# Patient Record
Sex: FEMALE | Race: WHITE | NOT HISPANIC OR LATINO | Employment: FULL TIME | URBAN - METROPOLITAN AREA
[De-identification: names, ages, dates, MRNs, and addresses within clinical notes are randomized per-mention and may not be internally consistent; named-entity substitution may affect disease eponyms.]

---

## 2017-01-02 ENCOUNTER — TELEPHONE (OUTPATIENT)
Dept: NURSING | Facility: CLINIC | Age: 32
End: 2017-01-02

## 2017-01-02 ENCOUNTER — TELEPHONE (OUTPATIENT)
Dept: OBGYN | Facility: CLINIC | Age: 32
End: 2017-01-02

## 2017-01-02 NOTE — TELEPHONE ENCOUNTER
I called Gay back and she has not tried taking any Vit. B 6 or Unisom. I did advise pt to take Vit. B6, 25 mg one four times daily and Unisom 25mg 1/2 tab up to 3 times daily. She should try this for 24 hours and if she is not feeling any better or worse, then she needs to call us back right away. I encouraged pt to try and drink small sips of water frequently or ginger ale/7up.. LINDA Mazariegos RN

## 2017-01-02 NOTE — TELEPHONE ENCOUNTER
"Call Type: Triage Call    Presenting Problem: \"I'm about 8 weeks pregnant and I've issues with  nausea.\" Patient drinking less, ate few crackers since Saturday.  Voiding normal.  Triage Note:  Guideline Title: Pregnancy: Nausea or Vomiting  Recommended Disposition: See Provider within 8 Hours  Original Inclination: Would have called ED  Override Disposition:  Intended Action: Follow advice given  Physician Contacted: No  Unable to drink fluids or eat because of severe nausea AND not relieved with home  care measures or prescribed medications ?  YES  Signs of dehydration ? NO  Insulin requiring diabetic ? NO  Severe breathing problems ? NO  New or worsening signs and symptoms that may indicate shock ? NO  Foreign body in mouth, throat, or esophagus ? NO  Smoke/carbon monoxide exposure ? NO  Any change in vision OR eye pain ? NO  Following ingestion of toxic or caustic substance ? NO  Greater than 20 weeks gestation AND history of high blood pressure or history of  preeclampsia ? NO  Rapid heart rate (greater than 120 beats per minute) at rest ? NO  Unbearable abdominal/pelvic pain or cramping ? NO  Any cardiac signs/symptoms for more than 5 minutes, now or within last hour ? NO  Vomiting red, bloody or coffee-ground material, more than streaks of blood or  scant amount (not following nosebleed within past day) ? NO  Temperature of 100 F (37.7 C) or greater ? NO  Any vomiting associated with a head injury, now or within previous 48 hours ? NO  New onset seizure at any stage of pregnancy ? NO  Decreased fetal movement (less than 10 kicks/movements within two hours or a  significant change in usual pattern compared to previous days) ? NO  New onset nausea/vomiting associated with stiff neck causing pain with forward  head movement, severe generalized headache, fever, or altered mental status ? NO  Sudden onset vomiting following ingestion or inhalation overdose (accidental or  intentional) of illegal, prescription, " nonprescription or alternative drugs ? NO  Jaundice (yellowish tint to skin or whites of eyes) that is worsening or not  previously evaluated. ? NO  Sudden onset of focal neurological changes (difficulty speaking, numbness,  weakness, paralysis, loss of coordination, or change in vision such as double  vision or loss of visual field) ? NO  Persistent dizziness OR lightheadedness that does not resolve within ten minutes ?  NO  Unbearable headache ? NO  Abdominal pain is more bothersome than nausea or vomiting ? NO  Persistent headache 8 hours or more NOT relieved with nonprescription medication ?  NO  Known seizure disorder and is having or had a seizure within past 6 hours (at any  state of pregnancy) ? NO  Known seizure disorder AND had a seizure more than 6 hours ago ? NO  Unable to retain food or fluids for 8 hours or more due to recurrent vomiting ? NO  Physician Instructions:  Care Advice: Another adult should drive.  Call provider if develop temperature greater than 100 F (37.7 C).  CAUTIONS  SYMPTOM / CONDITION MANAGEMENT  List, or take, all current prescription(s), nonprescription or alternative  medication(s) to provider for evaluation.  Moderate daily activities until evaluated by provider.  Go to the ED if you have developed signs and symptoms of dehydration such  as very dry mouth and tongue  increased pulse rate at rest  no urine output for 12 hours or more  increasing weakness or drowsiness, or lightheadedness when trying to sit  upright or standing.  Take sips of clear liquids (such as water, clear fruit juices without pulp,  soda, tea or coffee without dairy or non-dairy creamer, clear broth or  bouillon, oral hydration solution, or plain gelatin, fruit ices/popsicles,  hard candy) as tolerated. Sucking on ice chips is another option.

## 2017-01-02 NOTE — TELEPHONE ENCOUNTER
"Clinic Action Needed: Yes. Please call Gay at [phone].     [facility] Triage Call    Presenting Problem:\"I'm about 8 weeks pregnant and I've issues with nausea.\"  Patient drinking less, ate few crackers since Saturday. Voiding normal.    Guideline Used:Pregnancy: Nausea and Vomiting.  Disposition:See Provider within 8 hours. Please advise patient.     Patient Recommendations/Teaching:Please call back if you do not hear from clinic or any further concerns.     Routed to: RN Pool.     Thank you.       Brianna Dee RN [facility]     "

## 2017-01-02 NOTE — TELEPHONE ENCOUNTER
Pt calls, left vm stating she can not find Vitamin B6 25 mg tablets, only 100 mg tablets.     Called pt, reports she tried [facility], [address],  [facility], [facility], and [facility]. Per online search [facility] carries and [facility] may as well.

## 2017-01-03 ENCOUNTER — TELEPHONE (OUTPATIENT)
Dept: OBGYN | Facility: CLINIC | Age: 32
End: 2017-01-03

## 2017-01-03 DIAGNOSIS — O21.9 NAUSEA AND VOMITING DURING PREGNANCY: Primary | ICD-10-CM

## 2017-01-03 RX ORDER — ONDANSETRON 4 MG/1
4 TABLET, ORALLY DISINTEGRATING ORAL EVERY 8 HOURS PRN
Qty: 30 TABLET | Refills: 1 | Status: SHIPPED | OUTPATIENT
Start: 2017-01-03 | End: 2017-02-02

## 2017-01-03 NOTE — TELEPHONE ENCOUNTER
"Pt currently 8w5d.    See 01/02/17 telephone encounter discussing nausea. Pt reports finding Vit B6 25 mg tabs at [facility] and has been taking with the Unisom and other interventions in encounter with no relief. States unisom \"knocked me out\".     Pt understands she hasn't seen Dr. Garcia yet (1st appt is 01/11), but would like further recommendations for nausea control. Indicates no vomit, but nausea with decreased appetite. Reports eating only a piece of toast both yest and today. Pt goes into work at 7PM.    Please advise, thanks.  "

## 2017-01-03 NOTE — TELEPHONE ENCOUNTER
Called pt, relay MD message below. Pt concerned as she has heard zofran can cause fetal heart defects. States she had colleague that took diclegis for nausea during pregnancy with good results. Offer to send MD note asking if diclegis is an option to be ordered and/or asking for further information on concern regarding s/e to baby. Pt declines at this time. Plans to try zofran and will call clinic back if desires to pursue questions/concerns further.

## 2017-01-11 ENCOUNTER — PRENATAL OFFICE VISIT (OUTPATIENT)
Dept: OBGYN | Facility: CLINIC | Age: 32
End: 2017-01-11
Payer: COMMERCIAL

## 2017-01-11 VITALS — DIASTOLIC BLOOD PRESSURE: 60 MMHG | SYSTOLIC BLOOD PRESSURE: 100 MMHG | TEMPERATURE: 98.2 F | WEIGHT: 173.6 LBS

## 2017-01-11 DIAGNOSIS — Z34.01 SUPERVISION OF NORMAL FIRST PREGNANCY IN FIRST TRIMESTER: Primary | ICD-10-CM

## 2017-01-11 PROBLEM — Z34.00 SUPERVISION OF NORMAL FIRST PREGNANCY: Status: ACTIVE | Noted: 2017-01-11

## 2017-01-11 PROCEDURE — 99207 ZZC FIRST OB VISIT: CPT | Performed by: OBSTETRICS & GYNECOLOGY

## 2017-01-12 DIAGNOSIS — O26.90 PREGNANCY RELATED CONDITION, UNSPECIFIED TRIMESTER: Primary | ICD-10-CM

## 2017-01-12 NOTE — PROGRESS NOTES
This is a 31 year old female patient,   who presents for her first obstetrical visit.    EDC Aug 10, 2017 by lmp and ultrasound which makes her 9w6d weeks today.  Her cycles are regular.  Her last menstrual period was normal. Since her LMP, she has experienced  nausea).  She denies abdominal pain and vaginal bleeding. Ultrasound in the 1st trimester showed EDC consistent with dates by sure LMP.     Past History:  Her past medical history   Past Medical History   Diagnosis Date     Depression      in High school     Anxiety      in High school   .      This is her first pregnancy. It was not planned, but they are very excited. They were just  in September.    Since her last LMP she denies use of alcohol, tobacco and street drugs.    HISTORY:  Obstetric History       T0      TAB0   SAB0   E0   M0   L0       # Outcome Date GA Lbr Waqas/2nd Weight Sex Delivery Anes PTL Lv   1 Current                 Past Medical History   Diagnosis Date     Depression      in High school     Anxiety      in High school     Past Surgical History   Procedure Laterality Date     Cholecystectomy       Hc tooth extraction w/forcep  2000     Adenoidectomy  2009     Family History   Problem Relation Age of Onset     Rheumatoid Arthritis Mother 50     Family History Negative Father      Family History Negative Sister      Type 2 Diabetes Maternal Grandfather      Lung Cancer Maternal Grandfather      smoker     Thyroid Disease Maternal Grandmother      CEREBROVASCULAR DISEASE Maternal Grandmother 75     Breast Cancer Paternal Grandmother      HEART DISEASE Paternal Grandfather      Social History     Social History     Marital Status:      Spouse Name: N/A     Number of Children: N/A     Years of Education: N/A     Social History Main Topics     Smoking status: Never Smoker      Smokeless tobacco: Never Used     Alcohol Use: No      Comment: not during pregnancy     Drug Use: No     Sexual Activity:      Partners: Male     Other Topics Concern      Service No     Blood Transfusions No     Caffeine Concern Yes     Occupational Exposure Yes     Hobby Hazards No     Sleep Concern Yes     Stress Concern No     Weight Concern No     Special Diet No     Back Care No     Exercise No     Bike Helmet Yes     Seat Belt Yes     Self-Exams No     Social History Narrative     Current Outpatient Prescriptions   Medication Sig     Pyridoxine HCl (VITAMIN B6 PO) Take 25 mg by mouth     PRENATAL VIT-FE BISG-FA-DHA PO      ondansetron (ZOFRAN ODT) 4 MG ODT tab Take 1 tablet (4 mg) by mouth every 8 hours as needed for nausea     No current facility-administered medications for this visit.     No Known Allergies    Past medical, surgical, social and family history were reviewed and updated in EPIC.    ROS:   12 point review of systems negative other than symptoms noted below.    EXAM:  /60 mmHg  Temp(Src) 98.2  F (36.8  C) (Oral)  Wt 78.744 kg (173 lb 9.6 oz)  LMP 11/03/2016 (Exact Date)  Breastfeeding? No   BMI: There is no height on file to calculate BMI.    EXAM:  Constitutional: Appearance: Well nourished, well developed alert, in no acute distress  Chest:  Respiratory Effort:  Breathing unlabored  Skin:  General Inspection:  No rashes present, no lesions present, no areas of  discoloration.  Neurologic/Psychiatric:    Mental Status:  Oriented X3     Pelvis: Not needed today. She is too early for FHTs abdominally, so that was not attempted.    ASSESSMENT:      ICD-10-CM    1. Supervision of normal first pregnancy in first trimester Z34.01 MAT FETAL MED CTR REFERRAL-PREGNANCY       PLAN:    Prenatal labs reviewed. She has no questions.    Discussed options for screening for chromosomal anomalies, including first screen, noninvasive prenatal testing, CVS/amniocentesis, quad screen, and ultrasound at 18-20 weeks. She is electing first trimester screen.    Reviewed early pregnancy education, diet, exercise, prenatal  vitamins, intercourse. Reviewed the call schedule, labor and delivery, and the schedule of prenatal visits.    Follow up in 4 weeks. She is encouraged to call sooner with questions or concerns.      Sally Garcia MD

## 2017-01-18 ENCOUNTER — TELEPHONE (OUTPATIENT)
Dept: OBGYN | Facility: CLINIC | Age: 32
End: 2017-01-18

## 2017-01-18 NOTE — TELEPHONE ENCOUNTER
Pt is calling she is needing a quantity limit override for Zofran Call [phone]  ID [id number]   Changed pharmacies as well.    [facility]   Called at above number   Over the phone I was able to answer the question for the quantity limit override.  I was told it would take 24-48 hours to hear back from them about their decision.    Denilson BLANCO RN

## 2017-01-18 NOTE — TELEPHONE ENCOUNTER
Pt called and LM, informed that filed paperwork for Zofran. It will take 24-48 hours to hear back.    Denilson BLANCO RN

## 2017-01-19 ENCOUNTER — PRE VISIT (OUTPATIENT)
Dept: MATERNAL FETAL MEDICINE | Facility: CLINIC | Age: 32
End: 2017-01-19

## 2017-01-19 ENCOUNTER — MYC MEDICAL ADVICE (OUTPATIENT)
Dept: OBGYN | Facility: CLINIC | Age: 32
End: 2017-01-19

## 2017-01-19 NOTE — Clinical Note
[facility]  [address], [address]  [place], [place]  [address]                                            TEL:[phone]  FAX:[phone]            2017    To Whom It May Concern,      This is a letter of medical necessity for Gay Trinidad  1985. Gay is pregnant and is requiring Ondansetron 4mg tablets 1 tablet 1-2 times a day to control hyperemesis related to pregnancy. Therefore, Dr. Sally Garcia is requesting 30 tablet fill with 3 refills be covered by insurance.       Sincerely,        Dr. Sally Garcia

## 2017-01-19 NOTE — TELEPHONE ENCOUNTER
Fine to do a letter stating that she needs this 1-2 times daily for hyperemesis, so requesting 30 with 3 refills. Thanks.  Sally Garcia MD

## 2017-01-20 NOTE — TELEPHONE ENCOUNTER
Pt calls to check status of below.     See 01/18 telephone encounter. Called insurance company, the PA started on 01/18 has been denied. Next step is to fax a letter of medical necessity to the exceptions department at: [phone]. The letter needs to include that she is pregnant, the amount requested, why this amount is needed, and MD signature.    As MD is not at this location letter may need to be addressed by CR staff or MD may fax the letter back to [place] to address.    Please advise, thanks.    Called pt, notified of update above. Next available fill is tomorrow. Discussed if need be paying out of pocket for a few pills if need be until response from insurance.

## 2017-01-26 ENCOUNTER — HOSPITAL ENCOUNTER (OUTPATIENT)
Dept: ULTRASOUND IMAGING | Facility: CLINIC | Age: 32
Discharge: HOME | End: 2017-01-26
Attending: OBSTETRICS & GYNECOLOGY | Admitting: OBSTETRICS & GYNECOLOGY
Payer: COMMERCIAL

## 2017-01-26 ENCOUNTER — OFFICE VISIT (OUTPATIENT)
Dept: MATERNAL FETAL MEDICINE | Facility: CLINIC | Age: 32
End: 2017-01-26
Attending: OBSTETRICS & GYNECOLOGY
Payer: COMMERCIAL

## 2017-01-26 ENCOUNTER — HOSPITAL ENCOUNTER (OUTPATIENT)
Dept: LAB | Facility: CLINIC | Age: 32
End: 2017-01-26
Attending: OBSTETRICS & GYNECOLOGY
Payer: COMMERCIAL

## 2017-01-26 DIAGNOSIS — Z36.9 FIRST TRIMESTER SCREENING: Primary | ICD-10-CM

## 2017-01-26 DIAGNOSIS — O26.90 PREGNANCY RELATED CONDITION, UNSPECIFIED TRIMESTER: ICD-10-CM

## 2017-01-26 DIAGNOSIS — Z36.9 FIRST TRIMESTER SCREENING: ICD-10-CM

## 2017-01-26 PROCEDURE — 84704 HCG FREE BETACHAIN TEST: CPT | Performed by: OBSTETRICS & GYNECOLOGY

## 2017-01-26 PROCEDURE — 84163 PAPPA SERUM: CPT | Performed by: OBSTETRICS & GYNECOLOGY

## 2017-01-26 PROCEDURE — 36415 COLL VENOUS BLD VENIPUNCTURE: CPT | Performed by: OBSTETRICS & GYNECOLOGY

## 2017-01-26 PROCEDURE — 76801 OB US < 14 WKS SINGLE FETUS: CPT

## 2017-01-26 PROCEDURE — 96040 ZZH GENETIC COUNSELING, EACH 30 MINUTES: CPT | Mod: ZF | Performed by: GENETIC COUNSELOR, MS

## 2017-01-26 NOTE — PROGRESS NOTES
"Please see \"imaging\" tab under \"Chart Review\" for details of today's US at the [facility].    Benedict Harman MD  Maternal-Fetal Medicine    "

## 2017-01-26 NOTE — PROGRESS NOTES
[facility]  Genetic Counseling Consult    Patient: Gay Trinidad YOB: 1985   Date of Service: 17      Gay Trinidad was seen at [facility] for genetic consultation to discuss the options for routine screening for fetal chromosome abnormalities.       Impression/Plan:   1.  Gay had an ultrasound and blood draw for first trimester screening.  Results are expected within 3-5 days, and will be available in [facility].  We will contact her to discuss the results, and a copy will be forwarded to the office of the referring OB provider.    2. Maternal serum AFP (single marker screen) is recommended after 15 weeks to screen for open neural tube defects. A quad screen should not be performed.    Pregnancy History:   /Parity:     Age at Delivery: 32 year old  ARYA: 8/10/2017, Alternate ARYA Entry  Gestational Age: 12w0d    No significant complications or exposures were reported in the current pregnancy.       Family History:   A three-generation pedigree was obtained, and is scanned under the  Media  tab.   The following significant findings were reported by Gay:    Gay's , Faisal, reported that his maternal aunt was born with a cleft lip. Cleft lip (+/- palate) is a relatively common birth defect, occurring in approximately 1 in 1,000 live births. In the majority of cases, cleft lip (+/- palate) occurs as an isolated birth defect. However, many genetic syndromes and chromosome disorders have been identified which include cleft lip with or without palate. Based on the information provided, it is likely that Faisal's aunt's cleft lip cured as an isolated birth defect. Isolated cleft lip (+/- palate) is a multi-factorial condition, due to the interaction of multiple genes (polygenic) and environmental factors. Since there is a genetic component to isolated clefting abnormalities, there is an increased risk for  first degree relatives to also have a cleft lip (+/- palate). Since Luis's pregnancy is not a first degree relative to his maternal aunt, they are not at increased risk (high that the general population risk) to have a child with cleft lip (+/- palate).    Otherwise, the reported family history is negative for multiple miscarriages, stillbirths, mental retardation, known genetic conditions, and consanguinity.       Carrier Screening:   The patient reports that she and the father of the pregnancy have  ancestry:     Cystic fibrosis is an autosomal recessive genetic condition that occurs with increased frequency in individuals of  ancestry and carrier screening for this condition is available.  In addition,  screening in the [facility] includes cystic fibrosis.    Expanded carrier screening for mutations in a large panel of genes associated with autosomal recessive conditions including cystic fibrosis, spinal muscular atrophy, and others, is now available.    The patient was not certain about whether to pursue carrier screening today.  She was provided with a brochure about her testing options, and contact information if she has questions or wishes to pursue screening.       Risk Assessment for Chromosome Conditions:   We explained that the risk for fetal chromosome abnormalities increases with maternal age. We discussed specific features of common chromosome abnormalities, including Down syndrome, trisomy 13, trisomy 18, and sex chromosome trisomies.    At age 32 at delivery, the midtrimester risk to have a baby with Down syndrome is 1 in 597.    At age 32 at delivery, the midtrimester risk to have a baby with any chromosome abnormality is 1 in 299.        Testing Options:   We discussed the following options:   First trimester screening    First trimester ultrasound with nuchal translucency and nasal bone assessments, maternal plasma hCG, PETER-A, and AFP  measurement    Screens for fetal trisomy 21, trisomy 13, and trisomy 18    Cannot screen for open neural tube defects; maternal serum AFP after 15 weeks is recommended    We discussed additional testing options if her first trimester screen results are abnormal:   Non-invasive Prenatal Testing (NIPT)    Maternal plasma cell-free DNA testing; first trimester ultrasound with nuchal translucency and nasal bone assessment is recommended, when appropriate    Screens for fetal trisomy 21, trisomy 13, trisomy 18, and sex chromosome aneuploidy    Cannot screen for open neural tube defects; maternal serum AFP after 15 weeks is recommended     Chorionic villus sampling (CVS)    Invasive procedure typically performed in the first trimester by which placental villi are obtained for the purpose of chromosome analysis and/or other prenatal genetic analysis    Diagnostic results; >99% sensitivity for fetal chromosome abnormalities    Cannot test for open neural tube defects; maternal serum AFP after 15 weeks is recommended     Genetic Amniocentesis    Invasive procedure typically performed in the second trimester by which amniotic fluid is obtained for the purpose of chromosome analysis and/or other prenatal genetic analysis    Diagnostic results; >99% sensitivity for fetal chromosome abnormalities    AFAFP measurement tests for open neural tube defects     Comprehensive (Level II) ultrasound: Detailed ultrasound performed between 18-22 weeks gestation to screen for major birth defects and markers for aneuploidy.    We reviewed the benefits and limitations of this testing.  Screening tests provide a risk assessment specific to the pregnancy for certain fetal chromosome abnormalities, but cannot definitively diagnose or exclude a fetal chromosome abnormality.  Follow-up genetic counseling and consideration of diagnostic testing is recommended with any abnormal screening result.      It was a pleasure to be involved with Gay childs  care. Face-to-face time of the meeting was 30 minutes.    Radha Leyva MS, [facility]  Maternal Fetal Medicine  [facility]  Phone:[phone]  Email: [email or web address]

## 2017-01-27 NOTE — TELEPHONE ENCOUNTER
LMOVM of approved medication letter from [facility]  Brittnee Martinez CMA

## 2017-01-31 ENCOUNTER — TELEPHONE (OUTPATIENT)
Dept: MATERNAL FETAL MEDICINE | Facility: CLINIC | Age: 32
End: 2017-01-31

## 2017-01-31 NOTE — TELEPHONE ENCOUNTER
Left a message for Gay regarding her first trimester screening results. These results indicated a 1 in >10,000 risk for Down syndrome and a 1 in >10,000 risk for trisomy 18/13.  This screening test gives information about the risk of some chromosome conditions in a pregnancy, but does not definitively diagnose or exclude the presence of these chromosome conditions.  A copy of these results are available in her EPIC chart for her primary OB to review. Maternal serum AFP only is recommended in the second trimester to screen for neural tube defects.    Radha Leyva MS, [facility]  Maternal Fetal Medicine  [phone]

## 2017-02-01 LAB — LAB SCANNED RESULT: NORMAL

## 2017-02-02 ENCOUNTER — PRENATAL OFFICE VISIT (OUTPATIENT)
Dept: OBGYN | Facility: CLINIC | Age: 32
End: 2017-02-02
Payer: COMMERCIAL

## 2017-02-02 VITALS
DIASTOLIC BLOOD PRESSURE: 56 MMHG | SYSTOLIC BLOOD PRESSURE: 112 MMHG | HEIGHT: 67 IN | WEIGHT: 174.4 LBS | BODY MASS INDEX: 27.37 KG/M2

## 2017-02-02 DIAGNOSIS — Z34.00 SUPERVISION OF NORMAL FIRST PREGNANCY, ANTEPARTUM: Primary | ICD-10-CM

## 2017-02-02 DIAGNOSIS — O21.9 NAUSEA AND VOMITING DURING PREGNANCY: ICD-10-CM

## 2017-02-02 PROCEDURE — 99207 ZZC PRENATAL VISIT: CPT | Performed by: OBSTETRICS & GYNECOLOGY

## 2017-02-02 RX ORDER — ONDANSETRON 4 MG/1
4 TABLET, ORALLY DISINTEGRATING ORAL EVERY 8 HOURS PRN
Qty: 30 TABLET | Refills: 1 | Status: SHIPPED | OUTPATIENT
Start: 2017-02-02 | End: 2017-07-14

## 2017-02-02 NOTE — NURSING NOTE
"Chief Complaint   Patient presents with     Prenatal Care   13w0d  Need Rx for Zofran faxed to pharmacy in chart.  Pt c/o constipation.   Sleep issues, feelings of guilt.  Carissa Longoria MA      Initial /56 mmHg  Ht 5' 7\" (1.702 m)  Wt 174 lb 6.4 oz (79.107 kg)  BMI 27.31 kg/m2  LMP 11/03/2016 (Exact Date) Estimated body mass index is 27.31 kg/(m^2) as calculated from the following:    Height as of this encounter: 5' 7\" (1.702 m).    Weight as of this encounter: 174 lb 6.4 oz (79.107 kg).  BP completed using cuff size: regular      "

## 2017-02-02 NOTE — PROGRESS NOTES
"PROBLEM LIST  Low risk primip.  Apos/[place]/normal first tm screen/AFP:     She is still having significant nausea and vomiting. Weight is stable, but she is worried about \"not eating enough.\" Specifically, that she isn't eating healthy foods. Discussed bland diet, carbs okay for now, take prenatal vit when she can, etc. Will renew Zofran--using between 1-3 times per day. Significant constipation, likely a result of diet, pregnancy, and zofran. She is using stool softeners and Miralax.    She is also struggling emotionally with guilt--her neighbor and friend is pregnant, baby has what sounds like a lethal anomaly. She has family members who have been struggling to conceive. She feels guilty and doesn't know what to say to tell them about her pregnancy. We spent a lot of time discussing today. She denies anxiety or depression, states that she is actually very happy about this but struggling with how to tell her friends. We talked about signs of symptoms of depression or anxiety, and what we can do to treat that if she develops new symptoms.     RTC 4 weeks, AFP then.     Sally Garcia MD    "

## 2017-03-01 ENCOUNTER — PRENATAL OFFICE VISIT (OUTPATIENT)
Dept: OBGYN | Facility: CLINIC | Age: 32
End: 2017-03-01
Payer: COMMERCIAL

## 2017-03-01 VITALS
BODY MASS INDEX: 27.77 KG/M2 | WEIGHT: 177.3 LBS | DIASTOLIC BLOOD PRESSURE: 60 MMHG | SYSTOLIC BLOOD PRESSURE: 110 MMHG | TEMPERATURE: 98.1 F

## 2017-03-01 DIAGNOSIS — Z34.02 ENCOUNTER FOR SUPERVISION OF NORMAL FIRST PREGNANCY IN SECOND TRIMESTER: Primary | ICD-10-CM

## 2017-03-01 LAB
ERYTHROCYTE [DISTWIDTH] IN BLOOD BY AUTOMATED COUNT: 13.6 % (ref 10–15)
HCT VFR BLD AUTO: 34.9 % (ref 35–47)
HGB BLD-MCNC: 11.8 G/DL (ref 11.7–15.7)
MCH RBC QN AUTO: 31.9 PG (ref 26.5–33)
MCHC RBC AUTO-ENTMCNC: 33.8 G/DL (ref 31.5–36.5)
MCV RBC AUTO: 94 FL (ref 78–100)
PLATELET # BLD AUTO: 237 10E9/L (ref 150–450)
RBC # BLD AUTO: 3.7 10E12/L (ref 3.8–5.2)
WBC # BLD AUTO: 7.7 10E9/L (ref 4–11)

## 2017-03-01 PROCEDURE — 99000 SPECIMEN HANDLING OFFICE-LAB: CPT | Performed by: OBSTETRICS & GYNECOLOGY

## 2017-03-01 PROCEDURE — 36415 COLL VENOUS BLD VENIPUNCTURE: CPT | Performed by: OBSTETRICS & GYNECOLOGY

## 2017-03-01 PROCEDURE — 99207 ZZC PRENATAL VISIT: CPT | Performed by: OBSTETRICS & GYNECOLOGY

## 2017-03-01 PROCEDURE — 85027 COMPLETE CBC AUTOMATED: CPT | Performed by: OBSTETRICS & GYNECOLOGY

## 2017-03-01 PROCEDURE — 82105 ALPHA-FETOPROTEIN SERUM: CPT | Mod: 90 | Performed by: OBSTETRICS & GYNECOLOGY

## 2017-03-01 RX ORDER — POLYETHYLENE GLYCOL 3350 17 G/17G
17 POWDER, FOR SOLUTION ORAL DAILY
COMMUNITY
End: 2017-07-14

## 2017-03-01 NOTE — NURSING NOTE
"Chief Complaint   Patient presents with     Prenatal Care     Nausea       Initial /60 (BP Location: Right arm, Patient Position: Chair, Cuff Size: Adult Regular)  Temp 98.1  F (36.7  C) (Oral)  Wt 177 lb 4.8 oz (80.4 kg)  LMP 11/03/2016 (Exact Date)  BMI 27.77 kg/m2 Estimated body mass index is 27.77 kg/(m^2) as calculated from the following:    Height as of 2/2/17: 5' 7\" (1.702 m).    Weight as of this encounter: 177 lb 4.8 oz (80.4 kg).  Medication Reconciliation: complete  16w6d    "

## 2017-03-01 NOTE — MR AVS SNAPSHOT
After Visit Summary   3/1/2017    Gay Trinidad    MRN: [id number]           Patient Information     Date Of Birth          1985        Visit Information        Provider Department      3/1/2017 2:00 PM Kayla Carlton MD [facility]        Today's Diagnoses     Encounter for supervision of normal first pregnancy in second trimester    -  1       Follow-ups after your visit        Your next 10 appointments already scheduled     Mar 28, 2017  8:15 AM CDT   US OB > 14 WEEKS COMPLETE SINGLE with RIUS1   [facility] ([facility])    [address]  [address]  [facility] [id number] [phone]           Please bring a list of your medicines (including vitamins, minerals and over-the-counter drugs). Also, tell your doctor about any allergies you may have. Wear comfortable clothes and leave your valuables at home.  If you re less than 20 weeks drink four 8-ounce glasses of fluid an hour before your exam. If you need to empty your bladder before your exam, try to release only a little urine. Then, drink another glass of fluid.  You may have up to two family members in the exam room. If you bring a small child, an adult must be there to care for him or her.  Please call the Imaging Department at your exam site with any questions.            Apr 04, 2017  3:00 PM CDT   ESTABLISHED PRENATAL with Kayla Carlton MD   [facility] ([facility])    [address] [place] [id number] [phone]              Who to contact     If you have questions or need follow up information about today's clinic visit or your schedule please contact [facility] directly at [phone].  Normal or non-critical lab and imaging results will be communicated to you by MyChart, letter or phone within 4 business days after the clinic has received the results. If you do not hear from us within 7  days, please contact the clinic through [employer] or phone. If you have a critical or abnormal lab result, we will notify you by phone as soon as possible.  Submit refill requests through [employer] or call your pharmacy and they will forward the refill request to us. Please allow 3 business days for your refill to be completed.          Additional Information About Your Visit        [employer] Information     [employer] gives you secure access to your electronic health record. If you see a primary care provider, you can also send messages to your care team and make appointments. If you have questions, please call your primary care clinic.  If you do not have a primary care provider, please call [phone] and they will assist you.        Care EveryWhere ID     This is your Care EveryWhere ID. This could be used by other organizations to access your [place] medical records  [id number]        Your Vitals Were     Temperature Last Period BMI (Body Mass Index)             98.1  F (36.7  C) (Oral) 11/03/2016 (Exact Date) 27.77 kg/m2          Blood Pressure from Last 3 Encounters:   03/01/17 110/60   02/02/17 112/56   01/11/17 100/60    Weight from Last 3 Encounters:   03/01/17 177 lb 4.8 oz (80.4 kg)   02/02/17 174 lb 6.4 oz (79.1 kg)   01/11/17 173 lb 9.6 oz (78.7 kg)              We Performed the Following     Alpha fetoprotein maternal screen     CBC with platelets        Primary Care Provider Office Phone # Fax #    Brittaney Mata [phone]       [facility] [address] [place] [address]        Thank you!     Thank you for choosing [facility]  for your care. Our goal is always to provide you with excellent care. Hearing back from our patients is one way we can continue to improve our services. Please take a few minutes to complete the written survey that you may receive in the mail after your visit with us. Thank you!             Your Updated Medication List - Protect others  around you: Learn how to safely use, store and throw away your medicines at www.disposemymeds.org.          This list is accurate as of: 3/1/17  2:34 PM.  Always use your most recent med list.                   Brand Name Dispense Instructions for use    COLACE PO      Take 50 mg by mouth as needed for constipation       ondansetron 4 MG ODT tab    ZOFRAN ODT    30 tablet    Take 1 tablet (4 mg) by mouth every 8 hours as needed for nausea       polyethylene glycol powder    MIRALAX/GLYCOLAX     Take 17 g by mouth daily       PRENATAL VIT-FE BISG-FA-DHA PO          VITAMIN B6 PO      Take 25 mg by mouth Reported on 3/1/2017

## 2017-03-01 NOTE — PROGRESS NOTES
Routine OB Visit:    S: Continues to have significant nausea, though improving. Continued zofran, but not daily. Eating frequently, mostly carbs but is able to add yogurt, peanut butter, cheese, etc and is torating PNV. no contractions. Good fetal movement. No LoF, VB.    O: /60 (BP Location: Right arm, Patient Position: Chair, Cuff Size: Adult Regular)  Temp 98.1  F (36.7  C) (Oral)  Wt 177 lb 4.8 oz (80.4 kg)  LMP 2016 (Exact Date)  BMI 27.77 kg/m2   Gen: resting comfortably, in NAD  See Prenatal flowsheet    A/P: Gay Trinidad is a 31 year old female  at 16w6d, here for routine OB care. Pregnancy c/b nausea and poor weight gain.  PNC: A+, RI. 1st TM screen within normal limits. AFP today. Anatomy scan 18-20 wks  Nausea: continue frequent meals, zofran as needed  Constipation: increase miralax, continue senna  Return to clinic in 4 weeks.     Kayla Carlton MD  Obstetrics and Gynecology  3/1/2017

## 2017-03-03 LAB
# FETUSES US: NORMAL
AFP ADJ MOM AMN: 1.06
AFP SERPL-MCNC: 35 NG/ML
AGE - REPORTED: 31.3
DATING METHOD: NORMAL
DIABETIC AT CONCEPTION: NO
FAMILY MEMBER DISEASES HX: NO
FAMILY MEMBER DISEASES HX: NO
GA METHOD: NORMAL
GA: 16.86 WK
HX OF HEREDITARY DISORDERS: NO
IDDM PATIENT QL: NO
INTEGRATED SCN PATIENT-IMP: NORMAL
LMP START DATE: NORMAL
PREV HX CHROMOSOME ABNORMALITY: NO
SPECIMEN DRAWN SERPL: NORMAL
TWINS: NORMAL

## 2017-03-28 ENCOUNTER — RADIANT APPOINTMENT (OUTPATIENT)
Dept: ULTRASOUND IMAGING | Facility: CLINIC | Age: 32
End: 2017-03-28
Attending: OBSTETRICS & GYNECOLOGY
Payer: COMMERCIAL

## 2017-03-28 VITALS — SYSTOLIC BLOOD PRESSURE: 102 MMHG | DIASTOLIC BLOOD PRESSURE: 72 MMHG

## 2017-03-28 DIAGNOSIS — Z34.00 SUPERVISION OF NORMAL FIRST PREGNANCY, ANTEPARTUM: ICD-10-CM

## 2017-03-28 PROCEDURE — 76805 OB US >/= 14 WKS SNGL FETUS: CPT | Performed by: OBSTETRICS & GYNECOLOGY

## 2017-03-31 DIAGNOSIS — Z34.02 ENCOUNTER FOR SUPERVISION OF NORMAL FIRST PREGNANCY IN SECOND TRIMESTER: Primary | ICD-10-CM

## 2017-04-04 ENCOUNTER — HOSPITAL ENCOUNTER (OUTPATIENT)
Dept: ULTRASOUND IMAGING | Facility: CLINIC | Age: 32
Discharge: HOME | End: 2017-04-04
Attending: OBSTETRICS & GYNECOLOGY | Admitting: OBSTETRICS & GYNECOLOGY
Payer: COMMERCIAL

## 2017-04-04 ENCOUNTER — PRENATAL OFFICE VISIT (OUTPATIENT)
Dept: OBGYN | Facility: CLINIC | Age: 32
End: 2017-04-04
Payer: COMMERCIAL

## 2017-04-04 ENCOUNTER — OFFICE VISIT (OUTPATIENT)
Dept: MATERNAL FETAL MEDICINE | Facility: CLINIC | Age: 32
End: 2017-04-04
Attending: OBSTETRICS & GYNECOLOGY
Payer: COMMERCIAL

## 2017-04-04 VITALS
HEART RATE: 83 BPM | WEIGHT: 182 LBS | DIASTOLIC BLOOD PRESSURE: 58 MMHG | SYSTOLIC BLOOD PRESSURE: 112 MMHG | OXYGEN SATURATION: 99 % | BODY MASS INDEX: 28.51 KG/M2

## 2017-04-04 DIAGNOSIS — O26.90 PREGNANCY RELATED CONDITION, UNSPECIFIED TRIMESTER: ICD-10-CM

## 2017-04-04 DIAGNOSIS — Z03.73 SUSPECTED FETAL ANOMALY NOT FOUND: Primary | ICD-10-CM

## 2017-04-04 DIAGNOSIS — Z36.3 ANTENATAL SCREENING FOR MALFORMATION USING ULTRASONICS: ICD-10-CM

## 2017-04-04 DIAGNOSIS — Z34.02 ENCOUNTER FOR SUPERVISION OF NORMAL FIRST PREGNANCY IN SECOND TRIMESTER: Primary | ICD-10-CM

## 2017-04-04 PROCEDURE — 76811 OB US DETAILED SNGL FETUS: CPT

## 2017-04-04 PROCEDURE — 99207 ZZC PRENATAL VISIT: CPT | Performed by: OBSTETRICS & GYNECOLOGY

## 2017-04-04 RX ORDER — PNV NO.95/FERROUS FUM/FOLIC AC 28MG-0.8MG
1 TABLET ORAL DAILY
COMMUNITY
End: 2018-10-08

## 2017-04-04 NOTE — MR AVS SNAPSHOT
After Visit Summary   4/4/2017    Gay Trinidad    MRN: [id number]           Patient Information     Date Of Birth          1985        Visit Information        Provider Department      4/4/2017 3:00 PM Kayla Carlton MD [facility]        Today's Diagnoses     Encounter for supervision of normal first pregnancy in second trimester    -  1       Follow-ups after your visit        Your next 10 appointments already scheduled     Apr 25, 2017  8:00 AM CDT   MFM US COMPRE SINGLE F/U with [id number]   [facility] Maternal Fetal Medicine Ultrasound - [facility])    [address]  [facility] [id number] [phone]           Wear comfortable clothes and leave your valuables at home.            Apr 25, 2017  8:30 AM CDT   Radiology MD with  MFM MD   [facility] Maternal Fetal Medicine - [facility])    [address]  [facility] [id number] [phone]           Please arrive at the time given for your first appointment.  This visit is used internally to schedule the physician's time during your ultrasound.              Future tests that were ordered for you today     Open Future Orders        Priority Expected Expires Ordered    MFM US Comprehensive Single F/U Routine 4/25/2017 4/4/2018 4/4/2017            Who to contact     If you have questions or need follow up information about today's clinic visit or your schedule please contact [facility] directly at [phone].  Normal or non-critical lab and imaging results will be communicated to you by MyChart, letter or phone within 4 business days after the clinic has received the results. If you do not hear from us within 7 days, please contact the clinic through [employer] or phone. If you have a critical or abnormal lab result, we will notify you by phone as soon as possible.  Submit refill requests through [employer] or call  your pharmacy and they will forward the refill request to us. Please allow 3 business days for your refill to be completed.          Additional Information About Your Visit        [employer] Information     [email or web address] gives you secure access to your electronic health record. If you see a primary care provider, you can also send messages to your care team and make appointments. If you have questions, please call your primary care clinic.  If you do not have a primary care provider, please call [phone] and they will assist you.        Care EveryWhere ID     This is your Care EveryWhere ID. This could be used by other organizations to access your [place] medical records  [id number]        Your Vitals Were     Pulse Last Period Pulse Oximetry BMI (Body Mass Index)          83 11/03/2016 (Exact Date) 99% 28.51 kg/m2         Blood Pressure from Last 3 Encounters:   04/04/17 112/58   03/28/17 102/72   03/01/17 110/60    Weight from Last 3 Encounters:   04/04/17 182 lb (82.6 kg)   03/01/17 177 lb 4.8 oz (80.4 kg)   02/02/17 174 lb 6.4 oz (79.1 kg)              Today, you had the following     No orders found for display         Today's Medication Changes          These changes are accurate as of: 4/4/17  4:15 PM.  If you have any questions, ask your nurse or doctor.               Start taking these medicines.        Dose/Directions    ranitidine 150 MG tablet   Commonly known as:  ZANTAC   Used for:  Encounter for supervision of normal first pregnancy in second trimester   Started by:  Kayla Carlton MD        Dose:  150 mg   Take 1 tablet (150 mg) by mouth 2 times daily   Quantity:  60 tablet   Refills:  11            Where to get your medicines      These medications were sent to [facility] #[id number] - [place], [place] - [address] [facility]  [address] [facility] [address]     Phone:  [phone]     ranitidine 150 MG tablet                Primary Care Provider Office Phone # Fax #    Brittaney Mata  [phone]       [facility] [address] [place] [address]        Thank you!     Thank you for choosing [facility]  for your care. Our goal is always to provide you with excellent care. Hearing back from our patients is one way we can continue to improve our services. Please take a few minutes to complete the written survey that you may receive in the mail after your visit with us. Thank you!             Your Updated Medication List - Protect others around you: Learn how to safely use, store and throw away your medicines at www.disposemymeds.org.          This list is accurate as of: 4/4/17  4:15 PM.  Always use your most recent med list.                   Brand Name Dispense Instructions for use    COLACE PO      Take 50 mg by mouth as needed for constipation       FISH OIL OMEGA-3 1000 MG Caps          ondansetron 4 MG ODT tab    ZOFRAN ODT    30 tablet    Take 1 tablet (4 mg) by mouth every 8 hours as needed for nausea       polyethylene glycol powder    MIRALAX/GLYCOLAX     Take 17 g by mouth daily       PRENATAL VIT-FE BISG-FA-DHA PO          ranitidine 150 MG tablet    ZANTAC    60 tablet    Take 1 tablet (150 mg) by mouth 2 times daily       VITAMIN B6 PO      Take 25 mg by mouth Reported on 3/1/2017

## 2017-04-04 NOTE — PROGRESS NOTES
Routine OB Visit:    S: Continues to have nausea, though improving. Continued occasional zofran. Eating frequently, mostly carbs but is able to add egg and veggie omlets. no contractions. Good fetal movement. No LoF, VB.    O: /58 (Cuff Size: Adult Regular)  Pulse 83  Wt 182 lb (82.6 kg)  LMP 2016 (Exact Date)  SpO2 99%  BMI 28.51 kg/m2   Gen: resting comfortably, in NAD  See Prenatal flowsheet    A/P: Gay Trinidad is a 31 year old female  at 21w5d, here for routine OB care. Pregnancy c/b nausea and poor weight gain.  PNC: A+, RI. 1st TM screen within normal limits. AFP today. Anatomy scan 18-20 wks  Nausea: continue frequent meals, zofran as needed. Try twice a day zantac for relief  Constipation: improved, continue senna and colace, as needed miralax  Return to clinic in 4 weeks.     Kayla Carlton MD  Obstetrics and Gynecology

## 2017-04-04 NOTE — NURSING NOTE
"Chief Complaint   Patient presents with     Prenatal Care       Initial /58 (Cuff Size: Adult Regular)  Pulse 83  Wt 182 lb (82.6 kg)  LMP 2016 (Exact Date)  SpO2 99%  BMI 28.51 kg/m2 Estimated body mass index is 28.51 kg/(m^2) as calculated from the following:    Height as of 17: 5' 7\" (1.702 m).    Weight as of this encounter: 182 lb (82.6 kg).  BP completed using cuff size: regular        The following HM Due:       The following patient reported/Care Every where data was sent to:  P ABSTRACT QUALITY INITIATIVES [67194]       21w5d           Nausea   Low Appetite  Having abdominal cramps     Nicole Reese CMA              "

## 2017-04-04 NOTE — PROGRESS NOTES
Please refer to ultrasound report under 'Imaging' Studies of 'Chart Review' tabs.    Ben Grant M.D.

## 2017-04-04 NOTE — MR AVS SNAPSHOT
After Visit Summary   2017    aGy Trinidad    MRN: [id number]           Patient Information     Date Of Birth          1985        Visit Information        Provider Department      2017 9:15 AM Ben Grant MD [facility] Maternal Fetal Medicine [facility]        Today's Diagnoses     Suspected fetal anomaly not found    -  1     screening for malformation using ultrasonics           Follow-ups after your visit        Your next 10 appointments already scheduled     2017  3:00 PM CDT   ESTABLISHED PRENATAL with Kayla Carlton MD   [facility]    [address] [address]   [phone]            2017  8:00 AM CDT   MFM US COMPRE SINGLE F/U with [email or web address]   [facility] Maternal Fetal Medicine Ultrasound - [facility])    [address]  [facility] [id number] [phone]           Wear comfortable clothes and leave your valuables at home.            2017  8:30 AM CDT   Radiology MD with  CLARICE LEARY   [facility] Maternal Fetal Medicine [facility] ([facility])    [address]  [facility] [address] [phone]           Please arrive at the time given for your first appointment.  This visit is used internally to schedule the physician's time during your ultrasound.              Future tests that were ordered for you today     Open Future Orders        Priority Expected Expires Ordered    MFM US Comprehensive Single F/U Routine 2017            Who to contact     If you have questions or need follow up information about today's clinic visit or your schedule please contact [facility]ERNAL FETAL MEDICINE [facility] directly at [phone].  Normal or non-critical lab and imaging results will be communicated to you by MyChart, letter or phone within 4 business days after the clinic has  received the results. If you do not hear from us within 7 days, please contact the clinic through [facility] or phone. If you have a critical or abnormal lab result, we will notify you by phone as soon as possible.  Submit refill requests through [facility] or call your pharmacy and they will forward the refill request to us. Please allow 3 business days for your refill to be completed.          Additional Information About Your Visit        [facility] Information     [facility] gives you secure access to your electronic health record. If you see a primary care provider, you can also send messages to your care team and make appointments. If you have questions, please call your primary care clinic.  If you do not have a primary care provider, please call [phone] and they will assist you.        Care EveryWhere ID     This is your Care EveryWhere ID. This could be used by other organizations to access your [place] medical records  [id number]        Your Vitals Were     Last Period                   11/03/2016 (Exact Date)            Blood Pressure from Last 3 Encounters:   03/28/17 102/72   03/01/17 110/60   02/02/17 112/56    Weight from Last 3 Encounters:   03/01/17 80.4 kg (177 lb 4.8 oz)   02/02/17 79.1 kg (174 lb 6.4 oz)   01/11/17 78.7 kg (173 lb 9.6 oz)               Primary Care Provider Office Phone # Fax #    Brittaney Mata [phone]       [facility] [address] [place] [address]        Thank you!     Thank you for choosing [facility]  for your care. Our goal is always to provide you with excellent care. Hearing back from our patients is one way we can continue to improve our services. Please take a few minutes to complete the written survey that you may receive in the mail after your visit with us. Thank you!             Your Updated Medication List - Protect others around you: Learn how to safely use, store and throw away your medicines at  www.disposemymeds.org.          This list is accurate as of: 4/4/17 10:01 AM.  Always use your most recent med list.                   Brand Name Dispense Instructions for use    COLACE PO      Take 50 mg by mouth as needed for constipation       ondansetron 4 MG ODT tab    ZOFRAN ODT    30 tablet    Take 1 tablet (4 mg) by mouth every 8 hours as needed for nausea       polyethylene glycol powder    MIRALAX/GLYCOLAX     Take 17 g by mouth daily       PRENATAL VIT-FE BISG-FA-DHA PO          VITAMIN B6 PO      Take 25 mg by mouth Reported on 3/1/2017

## 2017-04-25 ENCOUNTER — OFFICE VISIT (OUTPATIENT)
Dept: MATERNAL FETAL MEDICINE | Facility: CLINIC | Age: 32
End: 2017-04-25
Attending: OBSTETRICS & GYNECOLOGY
Payer: COMMERCIAL

## 2017-04-25 ENCOUNTER — HOSPITAL ENCOUNTER (OUTPATIENT)
Dept: ULTRASOUND IMAGING | Facility: CLINIC | Age: 32
Discharge: HOME | End: 2017-04-25
Attending: OBSTETRICS & GYNECOLOGY | Admitting: OBSTETRICS & GYNECOLOGY
Payer: COMMERCIAL

## 2017-04-25 DIAGNOSIS — Z03.79 SUSPECTED FETAL CONDITION NOT FOUND: Primary | ICD-10-CM

## 2017-04-25 DIAGNOSIS — Z03.73 SUSPECTED FETAL ANOMALY NOT FOUND: ICD-10-CM

## 2017-04-25 PROCEDURE — 76816 OB US FOLLOW-UP PER FETUS: CPT

## 2017-04-25 NOTE — MR AVS SNAPSHOT
After Visit Summary   4/25/2017    Gay Trinidad    MRN: [id number]           Patient Information     Date Of Birth          1985        Visit Information        Provider Department      4/25/2017 8:30 AM Vitor Robertson MD [facility] Maternal Fetal Medicine [facility]        Today's Diagnoses     Suspected fetal condition not found    -  1       Follow-ups after your visit        Your next 10 appointments already scheduled     May 10, 2017 11:00 AM CDT   ESTABLISHED PRENATAL with Kayla Carlton MD   [facility] ([facility])    [address]  [facility] [address] [phone]              Who to contact     If you have questions or need follow up information about today's clinic visit or your schedule please contact [facility]ERNAL FETAL MEDIC[facility] directly at [phone].  Normal or non-critical lab and imaging results will be communicated to you by [employer], letter or phone within 4 business days after the clinic has received the results. If you do not hear from us within 7 days, please contact the clinic through [employer] or phone. If you have a critical or abnormal lab result, we will notify you by phone as soon as possible.  Submit refill requests through [employer] or call your pharmacy and they will forward the refill request to us. Please allow 3 business days for your refill to be completed.          Additional Information About Your Visit        MyChart Information     [employer] gives you secure access to your electronic health record. If you see a primary care provider, you can also send messages to your care team and make appointments. If you have questions, please call your primary care clinic.  If you do not have a primary care provider, please call [phone] and they will assist you.        Care EveryWhere ID     This is your Care EveryWhere ID. This could be used by other organizations to access your [facility]  records  [id number]        Your Vitals Were     Last Period                   11/03/2016 (Exact Date)            Blood Pressure from Last 3 Encounters:   04/04/17 112/58   03/28/17 102/72   03/01/17 110/60    Weight from Last 3 Encounters:   04/04/17 82.6 kg (182 lb)   03/01/17 80.4 kg (177 lb 4.8 oz)   02/02/17 79.1 kg (174 lb 6.4 oz)              Today, you had the following     No orders found for display       Primary Care Provider Office Phone # Fax #    Brittaney Mata [phone]       [facility] [address] [place] [address]        Thank you!     Thank you for choosing [facility]  for your care. Our goal is always to provide you with excellent care. Hearing back from our patients is one way we can continue to improve our services. Please take a few minutes to complete the written survey that you may receive in the mail after your visit with us. Thank you!             Your Updated Medication List - Protect others around you: Learn how to safely use, store and throw away your medicines at www.disposemymeds.org.          This list is accurate as of: 4/25/17  2:54 PM.  Always use your most recent med list.                   Brand Name Dispense Instructions for use    COLACE PO      Take 50 mg by mouth as needed for constipation       FISH OIL OMEGA-3 1000 MG Caps          ondansetron 4 MG ODT tab    ZOFRAN ODT    30 tablet    Take 1 tablet (4 mg) by mouth every 8 hours as needed for nausea       polyethylene glycol powder    MIRALAX/GLYCOLAX     Take 17 g by mouth daily       PRENATAL VIT-FE BISG-FA-DHA PO          ranitidine 150 MG tablet    ZANTAC    60 tablet    Take 1 tablet (150 mg) by mouth 2 times daily       VITAMIN B6 PO      Take 25 mg by mouth Reported on 3/1/2017

## 2017-04-25 NOTE — PROGRESS NOTES
"Please see \"Imaging\" tab under \"Chart Review\" for details of today's US.    Vitor Robertson    "

## 2017-05-10 ENCOUNTER — PRENATAL OFFICE VISIT (OUTPATIENT)
Dept: OBGYN | Facility: CLINIC | Age: 32
End: 2017-05-10

## 2017-05-10 VITALS
BODY MASS INDEX: 29.52 KG/M2 | DIASTOLIC BLOOD PRESSURE: 60 MMHG | WEIGHT: 188.5 LBS | TEMPERATURE: 98.3 F | SYSTOLIC BLOOD PRESSURE: 100 MMHG

## 2017-05-10 DIAGNOSIS — Z34.00 SUPERVISION OF NORMAL FIRST PREGNANCY: ICD-10-CM

## 2017-05-10 NOTE — NURSING NOTE
"Chief Complaint   Patient presents with     Prenatal Care       Initial /60 (BP Location: Left arm, Patient Position: Chair, Cuff Size: Adult Regular)  Temp 98.3  F (36.8  C) (Oral)  Wt 188 lb 8 oz (85.5 kg)  LMP 11/03/2016 (Exact Date)  BMI 29.52 kg/m2 Estimated body mass index is 29.52 kg/(m^2) as calculated from the following:    Height as of 2/2/17: 5' 7\" (1.702 m).    Weight as of this encounter: 188 lb 8 oz (85.5 kg).  Medication Reconciliation: complete  26w6d    "

## 2017-05-10 NOTE — MR AVS SNAPSHOT
After Visit Summary   5/10/2017    Gay Trinidad    MRN: [id number]           Patient Information     Date Of Birth          1985        Visit Information        Provider Department      5/10/2017 11:00 AM Kayla Carlton MD [facility]        Today's Diagnoses     Supervision of normal first pregnancy           Follow-ups after your visit        Your next 10 appointments already scheduled     May 12, 2017 10:00 AM CDT   ESTABLISHED PRENATAL with Kayla Carlton MD   [facility] ([facility])    [address]  [facility] [id number] [phone]              Who to contact     If you have questions or need follow up information about today's clinic visit or your schedule please contact [facility] directly at [phone].  Normal or non-critical lab and imaging results will be communicated to you by MyChart, letter or phone within 4 business days after the clinic has received the results. If you do not hear from us within 7 days, please contact the clinic through MyChart or phone. If you have a critical or abnormal lab result, we will notify you by phone as soon as possible.  Submit refill requests through [employer] or call your pharmacy and they will forward the refill request to us. Please allow 3 business days for your refill to be completed.          Additional Information About Your Visit        MyChart Information     [employer] gives you secure access to your electronic health record. If you see a primary care provider, you can also send messages to your care team and make appointments. If you have questions, please call your primary care clinic.  If you do not have a primary care provider, please call [phone] and they will assist you.        Care EveryWhere ID     This is your Care EveryWhere ID. This could be used by other organizations to access your [place] medical records  [id number]        Your Vitals  Were     Temperature Last Period BMI (Body Mass Index)             98.3  F (36.8  C) (Oral) 11/03/2016 (Exact Date) 29.52 kg/m2          Blood Pressure from Last 3 Encounters:   05/10/17 100/60   04/04/17 112/58   03/28/17 102/72    Weight from Last 3 Encounters:   05/10/17 188 lb 8 oz (85.5 kg)   04/04/17 182 lb (82.6 kg)   03/01/17 177 lb 4.8 oz (80.4 kg)              Today, you had the following     No orders found for display         Today's Medication Changes          These changes are accurate as of: 5/10/17 11:43 AM.  If you have any questions, ask your nurse or doctor.               Stop taking these medicines if you haven't already. Please contact your care team if you have questions.     ranitidine 150 MG tablet   Commonly known as:  ZANTAC   Stopped by:  Kayla Carlton MD           VITAMIN B6 PO   Stopped by:  Kayla Carlton MD                    Primary Care Provider Office Phone # Fax #    Brittaney FRANCIS VieiraMata [phone]       [facility] [address] [place] [address]        Thank you!     Thank you for choosing [facility]  for your care. Our goal is always to provide you with excellent care. Hearing back from our patients is one way we can continue to improve our services. Please take a few minutes to complete the written survey that you may receive in the mail after your visit with us. Thank you!             Your Updated Medication List - Protect others around you: Learn how to safely use, store and throw away your medicines at www.disposemymeds.org.          This list is accurate as of: 5/10/17 11:43 AM.  Always use your most recent med list.                   Brand Name Dispense Instructions for use    COLACE PO      Take 50 mg by mouth as needed for constipation Reported on 5/10/2017       FISH OIL OMEGA-3 1000 MG Caps          ondansetron 4 MG ODT tab    ZOFRAN ODT    30 tablet    Take 1 tablet (4 mg) by mouth every 8 hours as needed for nausea        polyethylene glycol powder    MIRALAX/GLYCOLAX     Take 17 g by mouth daily Reported on 5/10/2017       PRENATAL VIT-FE BISG-FA-DHA PO

## 2017-05-12 ENCOUNTER — PRENATAL OFFICE VISIT (OUTPATIENT)
Dept: OBGYN | Facility: CLINIC | Age: 32
End: 2017-05-12
Payer: COMMERCIAL

## 2017-05-12 VITALS — BODY MASS INDEX: 29.63 KG/M2 | WEIGHT: 189.2 LBS | DIASTOLIC BLOOD PRESSURE: 60 MMHG | SYSTOLIC BLOOD PRESSURE: 100 MMHG

## 2017-05-12 DIAGNOSIS — Z34.02 ENCOUNTER FOR SUPERVISION OF NORMAL FIRST PREGNANCY IN SECOND TRIMESTER: Primary | ICD-10-CM

## 2017-05-12 LAB
ERYTHROCYTE [DISTWIDTH] IN BLOOD BY AUTOMATED COUNT: 13.3 % (ref 10–15)
HCT VFR BLD AUTO: 35.1 % (ref 35–47)
HGB BLD-MCNC: 11.7 G/DL (ref 11.7–15.7)
MCH RBC QN AUTO: 32.7 PG (ref 26.5–33)
MCHC RBC AUTO-ENTMCNC: 33.3 G/DL (ref 31.5–36.5)
MCV RBC AUTO: 98 FL (ref 78–100)
PLATELET # BLD AUTO: 220 10E9/L (ref 150–450)
RBC # BLD AUTO: 3.58 10E12/L (ref 3.8–5.2)
WBC # BLD AUTO: 8.9 10E9/L (ref 4–11)

## 2017-05-12 PROCEDURE — 90715 TDAP VACCINE 7 YRS/> IM: CPT | Performed by: OBSTETRICS & GYNECOLOGY

## 2017-05-12 PROCEDURE — 82950 GLUCOSE TEST: CPT | Performed by: OBSTETRICS & GYNECOLOGY

## 2017-05-12 PROCEDURE — 86780 TREPONEMA PALLIDUM: CPT | Performed by: OBSTETRICS & GYNECOLOGY

## 2017-05-12 PROCEDURE — 99207 ZZC PRENATAL VISIT: CPT | Performed by: OBSTETRICS & GYNECOLOGY

## 2017-05-12 PROCEDURE — 90471 IMMUNIZATION ADMIN: CPT | Performed by: OBSTETRICS & GYNECOLOGY

## 2017-05-12 PROCEDURE — 36415 COLL VENOUS BLD VENIPUNCTURE: CPT | Performed by: OBSTETRICS & GYNECOLOGY

## 2017-05-12 PROCEDURE — 85027 COMPLETE CBC AUTOMATED: CPT | Performed by: OBSTETRICS & GYNECOLOGY

## 2017-05-12 NOTE — NURSING NOTE
"Chief Complaint   Patient presents with     Prenatal Care       Initial /60 (BP Location: Right arm, Patient Position: Chair, Cuff Size: Adult Regular)  Wt 189 lb 3.2 oz (85.8 kg)  LMP 11/03/2016 (Exact Date)  BMI 29.63 kg/m2 Estimated body mass index is 29.63 kg/(m^2) as calculated from the following:    Height as of 2/2/17: 5' 7\" (1.702 m).    Weight as of this encounter: 189 lb 3.2 oz (85.8 kg).  Medication Reconciliation: complete   27w1d        "

## 2017-05-12 NOTE — PROGRESS NOTES
Routine OB Visit:    S: feeling much better. Good fetal movement. No LoF, VB.    O: /60 (BP Location: Right arm, Patient Position: Chair, Cuff Size: Adult Regular)  Wt 189 lb 3.2 oz (85.8 kg)  LMP 2016 (Exact Date)  BMI 29.63 kg/m2   Gen: resting comfortably, in NAD  See Prenatal flowsheet    A/P: Gay Trinidad is a 31 year old female  at 27w1d, here for routine OB care.  PNC: A+, RI. 1st TM screen within normal limits. AFP normal. Anatomy scan normal, girl. GCT, CBC, RPR, Tdap today  Nausea: resolved  Constipation: resolved  Return to clinic in 3 weeks.     Kayla Carlton MD  Obstetrics and Gynecology

## 2017-05-12 NOTE — MR AVS SNAPSHOT
After Visit Summary   5/12/2017    Gay Trinidad    MRN: [id number]           Patient Information     Date Of Birth          1985        Visit Information        Provider Department      5/12/2017 10:00 AM Kayla Carlton MD [facility]        Today's Diagnoses     Encounter for supervision of normal first pregnancy in second trimester    -  1       Follow-ups after your visit        Your next 10 appointments already scheduled     May 31, 2017 11:00 AM CDT   ESTABLISHED PRENATAL with Kayla Carlton MD   [facility] ([facility])    [address]  [facility] [id number] [phone]              Who to contact     If you have questions or need follow up information about today's clinic visit or your schedule please contact [facility] directly at [phone].  Normal or non-critical lab and imaging results will be communicated to you by MyChart, letter or phone within 4 business days after the clinic has received the results. If you do not hear from us within 7 days, please contact the clinic through MyChart or phone. If you have a critical or abnormal lab result, we will notify you by phone as soon as possible.  Submit refill requests through [employer] or call your pharmacy and they will forward the refill request to us. Please allow 3 business days for your refill to be completed.          Additional Information About Your Visit        MyChart Information     [employer] gives you secure access to your electronic health record. If you see a primary care provider, you can also send messages to your care team and make appointments. If you have questions, please call your primary care clinic.  If you do not have a primary care provider, please call [phone] and they will assist you.        Care EveryWhere ID     This is your Care EveryWhere ID. This could be used by other organizations to access your [facility]  records  [id number]        Your Vitals Were     Last Period BMI (Body Mass Index)                11/03/2016 (Exact Date) 29.63 kg/m2           Blood Pressure from Last 3 Encounters:   05/12/17 100/60   05/10/17 100/60   04/04/17 112/58    Weight from Last 3 Encounters:   05/12/17 189 lb 3.2 oz (85.8 kg)   05/10/17 188 lb 8 oz (85.5 kg)   04/04/17 182 lb (82.6 kg)              We Performed the Following     Anti Treponema     CBC with platelets     Glucose tolerance gest screen 1 hour     TDAP (ADACEL)        Primary Care Provider Office Phone # Fax #    Brittaney FRANCIS VieiraMata [phone]       [facility] [address] [place] [address]        Thank you!     Thank you for choosing [facility]  for your care. Our goal is always to provide you with excellent care. Hearing back from our patients is one way we can continue to improve our services. Please take a few minutes to complete the written survey that you may receive in the mail after your visit with us. Thank you!             Your Updated Medication List - Protect others around you: Learn how to safely use, store and throw away your medicines at www.disposemymeds.org.          This list is accurate as of: 5/12/17 11:05 AM.  Always use your most recent med list.                   Brand Name Dispense Instructions for use    COLACE PO      Take 50 mg by mouth as needed for constipation Reported on 5/12/2017       FISH OIL OMEGA-3 1000 MG Caps          ondansetron 4 MG ODT tab    ZOFRAN ODT    30 tablet    Take 1 tablet (4 mg) by mouth every 8 hours as needed for nausea       polyethylene glycol powder    MIRALAX/GLYCOLAX     Take 17 g by mouth daily Reported on 5/12/2017       PRENATAL VIT-FE BISG-FA-DHA PO

## 2017-05-13 LAB
GLUCOSE 1H P 50 G GLC PO SERPL-MCNC: 83 MG/DL (ref 60–129)
T PALLIDUM IGG+IGM SER QL: NEGATIVE

## 2017-05-31 ENCOUNTER — PRENATAL OFFICE VISIT (OUTPATIENT)
Dept: OBGYN | Facility: CLINIC | Age: 32
End: 2017-05-31
Payer: COMMERCIAL

## 2017-05-31 VITALS
WEIGHT: 191 LBS | HEART RATE: 72 BPM | TEMPERATURE: 98.3 F | DIASTOLIC BLOOD PRESSURE: 78 MMHG | SYSTOLIC BLOOD PRESSURE: 112 MMHG | BODY MASS INDEX: 29.91 KG/M2

## 2017-05-31 DIAGNOSIS — Z34.00 SUPERVISION OF NORMAL FIRST PREGNANCY, ANTEPARTUM: Primary | ICD-10-CM

## 2017-05-31 PROCEDURE — 99207 ZZC PRENATAL VISIT: CPT | Performed by: OBSTETRICS & GYNECOLOGY

## 2017-05-31 PROCEDURE — 59025 FETAL NON-STRESS TEST: CPT | Performed by: OBSTETRICS & GYNECOLOGY

## 2017-05-31 NOTE — NURSING NOTE
"Chief Complaint   Patient presents with     Prenatal Care     Pelvic Pressure     along with low back pain       Initial /78 (BP Location: Left arm, Patient Position: Chair, Cuff Size: Adult Regular)  Pulse 72  Temp 98.3  F (36.8  C) (Oral)  Wt 191 lb (86.6 kg)  LMP 11/03/2016 (Exact Date)  BMI 29.91 kg/m2 Estimated body mass index is 29.91 kg/(m^2) as calculated from the following:    Height as of 2/2/17: 5' 7\" (1.702 m).    Weight as of this encounter: 191 lb (86.6 kg).  Medication Reconciliation: complete  29w6d    "

## 2017-05-31 NOTE — PROGRESS NOTES
Routine OB Visit:    S: Signficant anxiety surrounding delivery process. Would like to consider elective primary c/s to be able to have a day scheduled and know the delivery route in an effort to reduce anxiety. Discussed risks associated with c/s including bleeding, infection, damage to nearby structures, risk of abnormal placentation in future pregnancies and risk of adhesive disease. No contractions. Good fetal movement. No LoF, VB.    O: /78 (BP Location: Left arm, Patient Position: Chair, Cuff Size: Adult Regular)  Pulse 72  Temp 98.3  F (36.8  C) (Oral)  Wt 191 lb (86.6 kg)  LMP 2016 (Exact Date)  BMI 29.91 kg/m2   Gen: resting comfortably, in NAD  [place]optones: 105-110  See Prenatal flowsheet    NST:  FHR: , moderate variability, +accels, no decels  [place]: no contractions    A: Gay Trinidad is a 32 year old female  at 29w6d, here for routine OB care. Pregnancy c/b arrhytmia detected on anatomy scan, normal heart rhythm on 2 subsequent US.    P:   1) PNC: A+, RI. 1st TM screen within normal limits. AFP normal. Anatomy scan normal, girl. GCT 83. S/p Tdap.  2) Delivery method: patient considering elective primary c/s for anxiety. Provided ACOG recommendations for  but did discuss that I will offer her an elective c/s if that is the decision she makes after reading information provided.  3) Fetal sinus bradycardia: NST today for mild bradycardia. Reactive, Cat I. Discussed kick counts and fetal monitoring. Will call with any decreased FM.  4) return to clinic in 2 weeks      Kayla Carlton MD  Obstetrics and Gynecology  2017

## 2017-05-31 NOTE — Clinical Note
This was a routine prenatal visit where NST was performed for mild fetal bradycardia. I just want to make sure she is billed for the prenatal visit and the NST.   Thanks, Kayla Carlton MD

## 2017-05-31 NOTE — MR AVS SNAPSHOT
After Visit Summary   5/31/2017    Gay Trinidad    MRN: [id number]           Patient Information     Date Of Birth          1985        Visit Information        Provider Department      5/31/2017 11:00 AM Kayla Carlton MD [facility]        Today's Diagnoses     Supervision of normal first pregnancy, antepartum    -  1    Fetal bradycardia before the onset of labor           Follow-ups after your visit        Your next 10 appointments already scheduled     Jun 16, 2017 10:00 AM CDT   ESTABLISHED PRENATAL with [facility]la [facility]ton MD   [facility]    [address]  [facility] [address]   [phone]            Jun 28, 2017  8:30 AM CDT   ESTABLISHED PRENATAL with Kayla Carlton MD   [facility]    [address]  [facility] [id number] [phone]            Jul 12, 2017  8:30 AM CDT   ESTABLISHED PRENATAL with Kayla Carlton MD   [facility]    [address]  [facility] [id number] [phone]              Who to contact     If you have questions or need follow up information about today's clinic visit or your schedule please contact [facility] directly at [phone].  Normal or non-critical lab and imaging results will be communicated to you by MyChart, letter or phone within 4 business days after the clinic has received the results. If you do not hear from us within 7 days, please contact the clinic through [employer] or phone. If you have a critical or abnormal lab result, we will notify you by phone as soon as possible.  Submit refill requests through [employer] or call your pharmacy and they will forward the refill request to us. Please allow 3 business days for your refill to be completed.          Additional Information About Your Visit        [employer] Information     [employer] gives  you secure access to your electronic health record. If you see a primary care provider, you can also send messages to your care team and make appointments. If you have questions, please call your primary care clinic.  If you do not have a primary care provider, please call [phone] and they will assist you.        Care EveryWhere ID     This is your Care EveryWhere ID. This could be used by other organizations to access your [place] medical records  [id number]        Your Vitals Were     Pulse Temperature Last Period BMI (Body Mass Index)          72 98.3  F (36.8  C) (Oral) 11/03/2016 (Exact Date) 29.91 kg/m2         Blood Pressure from Last 3 Encounters:   05/31/17 112/78   05/12/17 100/60   05/10/17 100/60    Weight from Last 3 Encounters:   05/31/17 191 lb (86.6 kg)   05/12/17 189 lb 3.2 oz (85.8 kg)   05/10/17 188 lb 8 oz (85.5 kg)              We Performed the Following     FETAL NON-STRESS TEST        Primary Care Provider Office Phone # Fax #    Brittaney FRANCIS VieiraMata [phone]       [facility] [address] [place] [address]        Thank you!     Thank you for choosing [facility]  for your care. Our goal is always to provide you with excellent care. Hearing back from our patients is one way we can continue to improve our services. Please take a few minutes to complete the written survey that you may receive in the mail after your visit with us. Thank you!             Your Updated Medication List - Protect others around you: Learn how to safely use, store and throw away your medicines at www.disposemymeds.org.          This list is accurate as of: 5/31/17  1:53 PM.  Always use your most recent med list.                   Brand Name Dispense Instructions for use    COLACE PO      Take 50 mg by mouth as needed for constipation Reported on 5/12/2017       FISH OIL OMEGA-3 1000 MG Caps          ondansetron 4 MG ODT tab    ZOFRAN ODT    30 tablet    Take 1 tablet (4 mg) by  mouth every 8 hours as needed for nausea       polyethylene glycol powder    MIRALAX/GLYCOLAX     Take 17 g by mouth daily Reported on 5/12/2017       PRENATAL VIT-FE BISG-FA-DHA PO

## 2017-06-10 ENCOUNTER — NURSE TRIAGE (OUTPATIENT)
Dept: NURSING | Facility: CLINIC | Age: 32
End: 2017-06-10

## 2017-06-10 NOTE — TELEPHONE ENCOUNTER
Gay reports that she is 31 weeks pregnant.   States that there was a concern for a low hear rate for baby at last office visit.   Calling with symptoms of decrease fetal movement: reporting only 2 kick counts in the last 2 hours.   Currently in [place], [place] and was recently on a hike.     Triage guidelines recommend to go into L&D now or contact on call provider.   FNA paged on call provider, Dr. Maria, via Smart Web at 4:24pm to call Gay back directly at [phone].  FNA advised that if no call back within 20 mins to call FNA back to have provider re-paged.   RN advised to call back with further questions or concerns.   Caller verbalized understanding of and agreement with plan and had no further questions.       Reason for Disposition    [1] Pregnant 23 or more weeks AND [2] baby moving less today by kick count  (e.g., kick count < 5 in 1 hour or < 10 in 2 hours)    Additional Information    Negative: Sounds like a life-threatening emergency to the triager    Negative: Injury to abdomen    Negative: [1] Pregnant > 36 weeks AND [2] having contractions or other symptoms of labor    Negative: [1] Pregnant < 37 weeks AND [2] having contractions or other symptoms of labor    Negative: [1] Pregnant > 20 weeks AND [2] abdominal pain    Negative: [1] Pregnant > 20 weeks AND [2] vaginal bleeding or spotting    Negative: Blurred vision or visual change    Negative: [1] SEVERE headache AND [2] not relieved with acetaminophen (e.g., Tylenol)    Negative: Leakage of fluid from vagina    Protocols used: PREGNANCY - DECREASED FETAL MOVEMENT-ADULT-    Lisa Bolden RN  [facility]

## 2017-06-16 ENCOUNTER — PRENATAL OFFICE VISIT (OUTPATIENT)
Dept: OBGYN | Facility: CLINIC | Age: 32
End: 2017-06-16
Payer: COMMERCIAL

## 2017-06-16 VITALS
BODY MASS INDEX: 30.12 KG/M2 | DIASTOLIC BLOOD PRESSURE: 60 MMHG | HEART RATE: 68 BPM | WEIGHT: 192.3 LBS | SYSTOLIC BLOOD PRESSURE: 94 MMHG

## 2017-06-16 DIAGNOSIS — R10.2 PELVIC PAIN IN FEMALE: ICD-10-CM

## 2017-06-16 DIAGNOSIS — Z34.00 SUPERVISION OF NORMAL FIRST PREGNANCY, ANTEPARTUM: Primary | ICD-10-CM

## 2017-06-16 PROCEDURE — 99207 ZZC PRENATAL VISIT: CPT | Performed by: OBSTETRICS & GYNECOLOGY

## 2017-06-16 NOTE — PROGRESS NOTES
Routine OB Visit:    S: Daily sharp shooting vaginal pain. No contractions. Good fetal movement. No LoF, VB. Still undecided on mode of delivery. Plans to attend hospital tour and prenatal classes, then decide on elective primary c/s vs .    O: BP 94/60  Pulse 68  Wt 192 lb 4.8 oz (87.2 kg)  LMP 2016 (Exact Date)  BMI 30.12 kg/m2   Gen: resting comfortably, in NAD  Pelvis: non-tender to palpation of pubic symphysis or bony pelvis  See Prenatal flowsheet    A: Gay Trinidad is a 32 year old female  at 32w1d, here for routine OB care. Pregnancy c/b arrhytmia detected on anatomy scan, normal heart rhythm on 2 subsequent US.    P:   1) PNC: A+, RI. 1st TM screen within normal limits. AFP normal. Anatomy scan normal, girl. GCT 83. S/p Tdap.  2) Delivery method: will discuss further at next visit. Will offer elective c/s if patient desires.  3) Vaginal pain: cxtns vs round ligament pain vs pubic symphysis diastasis or other musculoskeletal pain associated with changed physiology of pregnancy. physical therapy referral placed for evaluation and management.   4) return to clinic in 2 weeks      Kayla Carlton MD  Obstetrics and Gynecology

## 2017-06-16 NOTE — MR AVS SNAPSHOT
"              After Visit Summary   6/16/2017    Gay Trinidad    MRN: [id number]           Patient Information     Date Of Birth          1985        Visit Information        Provider Department      6/16/2017 10:00 AM Kayla Carlton MD [facility]        Today's Diagnoses     Supervision of normal first pregnancy, antepartum    -  1    Pelvic pain in female           Follow-ups after your visit        Additional Services     PHYSICAL THERAPY REFERRAL       This therapy referral will be filtered to a centralized scheduling office at [facility] and the patient will receive a call to schedule an appointment at a [facility] location most convenient for them.     [facility] provides Physical Therapy evaluation and treatment and many specialty services across the [facility] system.  If requesting a specialty program, please choose from the list below.    If you have not heard from the scheduling office within 2 business days, please call [phone] for all locations, with the exception of [place], please call [phone].  Treatment: Evaluation & Treatment  Special Instructions/Modalities: Patient is pregnant and has pelvic pain  Special Programs: None    Please be aware that coverage of these services is subject to the terms and limitations of your health insurance plan.  Call member services at your health plan with any benefit or coverage questions.      **Note to Provider:  If you are referring outside of [facility] for the therapy appointment, please list the name of the location in the \"special instructions\" above, print the referral and give to the patient to schedule the appointment.                  Your next 10 appointments already scheduled     Jun 30, 2017  3:00 PM CDT   ESTABLISHED PRENATAL with Kayla Carlton MD   [facility]    [address]  [facility] [id number] [phone]    "         Jul 14, 2017 11:30 AM CDT   ESTABLISHED PRENATAL with Kayla Carlton MD   [facility]    [address]  [facility] [address]   [phone]            Jul 25, 2017  2:00 PM CDT   ESTABLISHED PRENATAL with Kayla Carlton MD   [facility]    [address] [address]   [phone]            Aug 02, 2017  4:30 PM CDT   ESTABLISHED PRENATAL with Kayla Carlton MD   [facility]    [address]  [facility] [address]   [phone]            Aug 09, 2017 11:30 AM CDT   ESTABLISHED PRENATAL with Kayla Carlton MD   [facility])    [address]  [facility] [id number] [phone]              Who to contact     If you have questions or need follow up information about today's clinic visit or your schedule please contact [facility] directly at [phone].  Normal or non-critical lab and imaging results will be communicated to you by MyChart, letter or phone within 4 business days after the clinic has received the results. If you do not hear from us within 7 days, please contact the clinic through Livekickhart or phone. If you have a critical or abnormal lab result, we will notify you by phone as soon as possible.  Submit refill requests through LeCab or call your pharmacy and they will forward the refill request to us. Please allow 3 business days for your refill to be completed.          Additional Information About Your Visit        LivekickharTalkito Information     LeCab gives you secure access to your electronic health record. If you see a primary care provider, you can also send messages to your care team and make appointments. If you have questions, please call your primary care clinic.  If you do not have a primary care provider, please  call [phone] and they will assist you.        Care EveryWhere ID     This is your Care EveryWhere ID. This could be used by other organizations to access your [facility] medical records  [id number]        Your Vitals Were     Pulse Last Period BMI (Body Mass Index)             68 11/03/2016 (Exact Date) 30.12 kg/m2          Blood Pressure from Last 3 Encounters:   06/16/17 94/60   05/31/17 112/78   05/12/17 100/60    Weight from Last 3 Encounters:   06/16/17 192 lb 4.8 oz (87.2 kg)   05/31/17 191 lb (86.6 kg)   05/12/17 189 lb 3.2 oz (85.8 kg)              We Performed the Following     PHYSICAL THERAPY REFERRAL        Primary Care Provider Office Phone # Fax #    Brittaney FRANCIS VieiraMata [phone]       [facility] [address] [place] [address]        Thank you!     Thank you for choosing [facility]  for your care. Our goal is always to provide you with excellent care. Hearing back from our patients is one way we can continue to improve our services. Please take a few minutes to complete the written survey that you may receive in the mail after your visit with us. Thank you!             Your Updated Medication List - Protect others around you: Learn how to safely use, store and throw away your medicines at www.disposemymeds.org.          This list is accurate as of: 6/16/17  3:13 PM.  Always use your most recent med list.                   Brand Name Dispense Instructions for use    COLACE PO      Take 50 mg by mouth as needed for constipation Reported on 5/12/2017       FISH OIL OMEGA-3 1000 MG Caps          ondansetron 4 MG ODT tab    ZOFRAN ODT    30 tablet    Take 1 tablet (4 mg) by mouth every 8 hours as needed for nausea       polyethylene glycol powder    MIRALAX/GLYCOLAX     Take 17 g by mouth daily Reported on 5/12/2017       PRENATAL VIT-FE BISG-FA-DHA PO

## 2017-06-30 ENCOUNTER — PRENATAL OFFICE VISIT (OUTPATIENT)
Dept: OBGYN | Facility: CLINIC | Age: 32
End: 2017-06-30
Payer: COMMERCIAL

## 2017-06-30 VITALS
DIASTOLIC BLOOD PRESSURE: 66 MMHG | SYSTOLIC BLOOD PRESSURE: 118 MMHG | WEIGHT: 195 LBS | BODY MASS INDEX: 30.54 KG/M2 | HEART RATE: 64 BPM

## 2017-06-30 DIAGNOSIS — Z34.00 SUPERVISION OF NORMAL FIRST PREGNANCY, ANTEPARTUM: Primary | ICD-10-CM

## 2017-06-30 PROCEDURE — 99207 ZZC PRENATAL VISIT: CPT | Performed by: OBSTETRICS & GYNECOLOGY

## 2017-06-30 NOTE — NURSING NOTE
"Chief Complaint   Patient presents with     Prenatal Care     34 1/7wks.        Initial /66 (BP Location: Right arm, Patient Position: Sitting, Cuff Size: Adult Regular)  Pulse 64  Wt 195 lb (88.5 kg)  LMP 11/03/2016 (Exact Date)  BMI 30.54 kg/m2 Estimated body mass index is 30.54 kg/(m^2) as calculated from the following:    Height as of 2/2/17: 5' 7\" (1.702 m).    Weight as of this encounter: 195 lb (88.5 kg).  Medication Reconciliation: complete     34w1d  + FM daily  - contractions  + cramping   - bleeding or leaking of fluid  - headache or heartburn  - edema  Ashely Tan LPN      "

## 2017-06-30 NOTE — PROGRESS NOTES
Routine OB Visit:    S: Daily sharp shooting vaginal pain. No contractions. Good fetal movement. No LoF, VB. Still undecided on mode of delivery, would like to schedule a c/s and decide after her prenatal class in 2 weeks.    O: /66 (BP Location: Right arm, Patient Position: Sitting, Cuff Size: Adult Regular)  Pulse 64  Wt 195 lb (88.5 kg)  LMP 2016 (Exact Date)  BMI 30.54 kg/m2   Gen: resting comfortably, in NAD  Pelvis: non-tender to palpation of pubic symphysis or bony pelvis  See Prenatal flowsheet    A: Gay Trinidad is a 32 year old female  at 34w1d, here for routine OB care. Pregnancy c/b arrhytmia detected on anatomy scan, normal heart rhythm on 2 subsequent US.    P:   1) PNC: A+, RI. 1st TM screen within normal limits. AFP normal. Anatomy scan normal, girl. GCT 83. S/p Tdap.  2) Delivery method: case request sent, can cancel if patient decides on attempting vaginal delivery  3) Vaginal pain: improved somewhat with support belt and stretches, could not get appointment with physical therapy yet  4) return to clinic in 2 weeks      Kayla Carlton MD  Obstetrics and Gynecology

## 2017-06-30 NOTE — MR AVS SNAPSHOT
After Visit Summary   6/30/2017    Gay Trinidad    MRN: [id number]           Patient Information     Date Of Birth          1985        Visit Information        Provider Department      6/30/2017 3:00 PM Kayla Carlton MD [facility]        Today's Diagnoses     Supervision of normal first pregnancy, antepartum    -  1       Follow-ups after your visit        Your next 10 appointments already scheduled     Jul 13, 2017 10:10 AM CDT   ALAN For Women Only with Kenia Kieber, PT   [facility] ([facility])    [address] [facility] [address]   [phone]            Jul 14, 2017 11:30 AM CDT   ESTABLISHED PRENATAL with Kayla [facility]ton MD   [facility]    [address]  [facility] [id number] [phone]            Jul 18, 2017  1:20 PM CDT   ALAN For Women Only with Kenia Kristener, PT   [facility] ([facility])    [address] [facility] [address]   [phone]            Jul 25, 2017  2:00 PM CDT   ESTABLISHED PRENATAL with Kayla Carlton MD   [facility]    [address] [place] [id number] [phone]            Jul 27, 2017  9:30 AM CDT   ALAN For Women Only with Kenia Zwlashondaer, [facility] ([facility])    [address] [facility] [address]   [phone]            Aug 02, 2017  4:30 PM CDT   ESTABLISHED PRENATAL with Kayla Carlton MD   [facility]    [address]  [facility] [address] [phone]              Who to contact     If you have questions or need follow up information about today's clinic visit or your schedule please contact [facility] directly at [phone].  Normal or non-critical lab and imaging results will be  communicated to you by ClearApphart, letter or phone within 4 business days after the clinic has received the results. If you do not hear from us within 7 days, please contact the clinic through Amiare or phone. If you have a critical or abnormal lab result, we will notify you by phone as soon as possible.  Submit refill requests through Amiare or call your pharmacy and they will forward the refill request to us. Please allow 3 business days for your refill to be completed.          Additional Information About Your Visit        Amiare Information     Amiare gives you secure access to your electronic health record. If you see a primary care provider, you can also send messages to your care team and make appointments. If you have questions, please call your primary care clinic.  If you do not have a primary care provider, please call [phone] and they will assist you.        Care EveryWhere ID     This is your Care EveryWhere ID. This could be used by other organizations to access your [facility] medical records  [id number]        Your Vitals Were     Pulse Last Period BMI (Body Mass Index)             64 11/03/2016 (Exact Date) 30.54 kg/m2          Blood Pressure from Last 3 Encounters:   06/30/17 118/66   06/16/17 94/60   05/31/17 112/78    Weight from Last 3 Encounters:   06/30/17 195 lb (88.5 kg)   06/16/17 192 lb 4.8 oz (87.2 kg)   05/31/17 191 lb (86.6 kg)              Today, you had the following     No orders found for display       Primary Care Provider Office Phone # Fax #    Brittaney BLANCO Adolfo [phone]       [facility] [address] [place] [address]        Equal Access to Services     [facility] AH: Hadii aad ku hadasho Soomaali, waaxda luqadaha, qaybta kaalmada charu carvalho. So [facility] [place] [phone].    ATENCIÓN: Si habla español, tiene a salazar disposición servicios gratuitos de asistencia lingüística. Gladysame al [phone].    We comply with  applicable federal civil rights laws and [place] laws. We do not discriminate on the basis of race, color, national origin, age, disability sex, sexual orientation or gender identity.            Thank you!     Thank you for choosing [facility]  for your care. Our goal is always to provide you with excellent care. Hearing back from our patients is one way we can continue to improve our services. Please take a few minutes to complete the written survey that you may receive in the mail after your visit with us. Thank you!             Your Updated Medication List - Protect others around you: Learn how to safely use, store and throw away your medicines at www.disposemymeds.org.          This list is accurate as of: 6/30/17  3:54 PM.  Always use your most recent med list.                   Brand Name Dispense Instructions for use Diagnosis    COLACE PO      Take 50 mg by mouth as needed for constipation Reported on 5/12/2017        FISH OIL OMEGA-3 1000 MG Caps           ondansetron 4 MG ODT tab    ZOFRAN ODT    30 tablet    Take 1 tablet (4 mg) by mouth every 8 hours as needed for nausea    Nausea and vomiting during pregnancy       polyethylene glycol powder    MIRALAX/GLYCOLAX     Take 17 g by mouth daily Reported on 5/12/2017        PRENATAL VIT-FE BISG-FA-DHA PO

## 2017-07-03 ENCOUNTER — TELEPHONE (OUTPATIENT)
Dept: OBGYN | Facility: CLINIC | Age: 32
End: 2017-07-03

## 2017-07-03 NOTE — TELEPHONE ENCOUNTER
Left message for patient to return the call to inform of surgery details listed below.    Surgery:  PRIMARY  SECTION  Date:  17  Time:  8:30 AM  Hospital:  [facility]    Patient advised of the following:  The hospital will contact you 24-48 hours prior to surgery to discuss any pre op instructions.  Contact your insurance company to see if a pre authorization is needed.  A pre op physical will be done by your OB/GYN physician at one of your prenatal appointments.  Make arrangements to have someone drive you home from the hospital.    Surgery specific and hospital information mailed to patient.    Information was placed on the surgery calendar in [place].

## 2017-07-12 ENCOUNTER — TELEPHONE (OUTPATIENT)
Dept: OBGYN | Facility: CLINIC | Age: 32
End: 2017-07-12

## 2017-07-12 NOTE — TELEPHONE ENCOUNTER
35w6d  Estimated Date of Delivery: Aug 10, 2017      Pt calls today with complaints of vision disturbance and HA this morning.  White spots in frontal vision, peripheral was foggy.  HA is a bit better as of right now, did not take anything.  Vision has improved as well.    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    Pt has had nml fetal movement today.  No vaginal leakage or bleeding.      Drinking water. Advised to check BP a couple more times throughout the day.  Rest if feeling sx are not going away, call if they worsen.  Advised tylenol for HA.   Has appt this Friday with you.  Routed to Dr Carlton in case you have any further recommendation.    Usha Vargas R.N.  [facility]

## 2017-07-13 ENCOUNTER — THERAPY VISIT (OUTPATIENT)
Dept: PHYSICAL THERAPY | Facility: CLINIC | Age: 32
End: 2017-07-13
Payer: COMMERCIAL

## 2017-07-13 DIAGNOSIS — M53.3 SACROILIAC JOINT PAIN: ICD-10-CM

## 2017-07-13 DIAGNOSIS — S33.8XXA: Primary | ICD-10-CM

## 2017-07-13 DIAGNOSIS — Z33.1 PREGNANCY, INCIDENTAL: ICD-10-CM

## 2017-07-13 PROCEDURE — 97140 MANUAL THERAPY 1/> REGIONS: CPT | Mod: GP | Performed by: PHYSICAL THERAPIST

## 2017-07-13 PROCEDURE — 97110 THERAPEUTIC EXERCISES: CPT | Mod: GP | Performed by: PHYSICAL THERAPIST

## 2017-07-13 PROCEDURE — 97161 PT EVAL LOW COMPLEX 20 MIN: CPT | Mod: GP | Performed by: PHYSICAL THERAPIST

## 2017-07-13 NOTE — PROGRESS NOTES
[facility] Initial Evaluation    Subjective:    Patient is a 32 year old female presenting with rehab back hpi.   Gay Trinidad is a 32 year old female with a sacroiliac (pubic symphysis pain) condition.      This is a new condition  Patient has chief complaint of pubic symphysis and bilateral lower lumbar pain which started 2 months ago during her pregnancy. She is 36 weeks gestation with her first child. She had morning sickness for 2-3 months,  otherwise normal pregnancy. She has tried a maternity belt given to her from a friend with minimal relief. 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    Patient reports pain:  Lumbar spine right and lumbar spine left (pubic symphysis).  Radiates to:  No radiation.  Pain is described as sharp and aching and is constant and intermittent and reported as 4/10.  Associated symptoms:  Pregnancy. Pain is worse in the P.M..  Symptoms are exacerbated by certain positions, lifting and walking (sit to stand, stairs) and relieved by ice.  Since onset symptoms are gradually worsening.        General health as reported by patient is excellent.                                              Objective:      Gait:    Gait Type:  Normal   Assistive Devices:  None                 Lumbar/SI Evaluation  ROM:    AROM Lumbar:   Flexion:            To ankles  Ext:                    60%*   Side Bend:        Left:  70%    Right:  70%  Rotation:           Left:  80%    Right:  80%  Side Glide:        Left:     Right:           Lumbar Myotomes:  normal                  Neural Tension/Mobility:  Lumbar:  Normal        Lumbar Palpation:  Palpation (lumbar): moderate tenderness pubic symphysis.  Tenderness present at Left:    Erector Spinae; Piriformis and PSIS  Tenderness present at Right: Erector Spinae        SI joint/Sacrum:      Provocation:  + left      Sacral conclusion left:  Elevated pubic sym                                               General      ROS    Assessment/Plan:      Patient is a 32 year old female with pubic symphysis and sacroiliac pain complaints.    Patient has the following significant findings with corresponding treatment plan.                Diagnosis 1:  Pubic symphysis and sacroiliac pain with pregnancy  Pain -  hot/cold therapy and self management  Decreased strength - therapeutic exercise, therapeutic activities and home program  Instability -  Therapeutic Activity  Therapeutic Exercise  home program    Therapy Evaluation Codes:   1) History comprised of:   Personal factors that impact the plan of care:      None.    Comorbidity factors that impact the plan of care are:      None.     Medications impacting care: None.  2) Examination of Body Systems comprised of:   Body structures and functions that impact the plan of care:      Sacral illiac joint and pubic symphysis.   Activity limitations that impact the plan of care are:      Squatting/kneeling, Walking and Working.  3) Clinical presentation characteristics are:   Stable/Uncomplicated.  4) Decision-Making    Low complexity using standardized patient assessment instrument and/or measureable assessment of functional outcome.  Cumulative Therapy Evaluation is: Low complexity.    Previous and current functional limitations:  (See Goal Flow Sheet for this information)    Short term and Long term goals: (See Goal Flow Sheet for this information)     Communication ability:  Patient appears to be able to clearly communicate and understand verbal and written communication and follow directions correctly.  Treatment Explanation - The following has been discussed with the patient:   RX ordered/plan of care  Anticipated outcomes  Possible risks and side effects  This patient would benefit from PT intervention to resume normal activities.   Rehab potential is good.    Frequency:  1 X week, once daily  Duration:  for 6 weeks  Discharge Plan:  Achieve all LTG.  Independent in home treatment  program.  Reach maximal therapeutic benefit.    Please refer to the daily flowsheet for treatment today, total treatment time and time spent performing 1:1 timed codes.

## 2017-07-13 NOTE — PROGRESS NOTES
Subjective:    Patient is a 32 year old female presenting with rehab left ankle/foot hpi.                                          Surgical history: gall bladder removed 12/15.    Current occupation is RN burn unit.    Primary job tasks include:  Prolonged standing.                                Objective:    System    Physical Exam    General     ROS    Assessment/Plan:

## 2017-07-14 ENCOUNTER — PRENATAL OFFICE VISIT (OUTPATIENT)
Dept: OBGYN | Facility: CLINIC | Age: 32
End: 2017-07-14
Payer: COMMERCIAL

## 2017-07-14 VITALS
HEIGHT: 67 IN | HEART RATE: 84 BPM | WEIGHT: 193.9 LBS | DIASTOLIC BLOOD PRESSURE: 70 MMHG | BODY MASS INDEX: 30.43 KG/M2 | SYSTOLIC BLOOD PRESSURE: 120 MMHG

## 2017-07-14 DIAGNOSIS — Z34.00 SUPERVISION OF NORMAL FIRST PREGNANCY, ANTEPARTUM: Primary | ICD-10-CM

## 2017-07-14 PROCEDURE — 99207 ZZC PRENATAL VISIT: CPT | Performed by: OBSTETRICS & GYNECOLOGY

## 2017-07-14 PROCEDURE — 87653 STREP B DNA AMP PROBE: CPT | Performed by: OBSTETRICS & GYNECOLOGY

## 2017-07-14 NOTE — NURSING NOTE
"Chief Complaint   Patient presents with     Prenatal Care     36+1 GBS due       Initial /70  Pulse 84  Ht 5' 7\" (1.702 m)  Wt 193 lb 14.4 oz (88 kg)  LMP 2016 (Exact Date)  BMI 30.37 kg/m2 Estimated body mass index is 30.37 kg/(m^2) as calculated from the following:    Height as of this encounter: 5' 7\" (1.702 m).    Weight as of this encounter: 193 lb 14.4 oz (88 kg).  BP completed using cuff size: regular        The following HM Due: NONE      The following patient reported/Care Every where data was sent to:  P ABSTRACT QUALITY INITIATIVES [[id number]]  NA     patient has appointment for today    Sumi MCLEAN               "

## 2017-07-14 NOTE — PROGRESS NOTES
"Routine OB Visit:    S: Continued sharp shooting vaginal pain. No contractions. Good fetal movement. No LoF, VB. Would like to proceed with elective primary  section. Discussed risks/benefits/alternatives. HA and scotoma last week, checked BP still normal range. Not ongoing.     O: /70  Pulse 84  Ht 5' 7\" (1.702 m)  Wt 193 lb 14.4 oz (88 kg)  LMP 2016 (Exact Date)  BMI 30.37 kg/m2   Gen: resting comfortably, in NAD  See Prenatal flowsheet    A: Gay Trinidad is a 32 year old female  at 36w1d, here for routine OB care. Pregnancy c/b arrhytmia detected on anatomy scan, normal heart rhythm on 2 subsequent US.    P:   1) PNC: A+, RI. 1st TM screen within normal limits. AFP normal. Anatomy scan normal, girl. GCT 83. S/p Tdap.  2) Delivery method: elective c/s scheduled, can cancel if patient decides on attempting vaginal delivery  3) Vaginal pain: improved somewhat with support belt and stretches,s/p physical therapy appointment, working on exercises and icing.  4) return to clinic weekly through delivery. Will need pre-op H&P.      Kayla Carlton MD  Obstetrics and Gynecology     "

## 2017-07-14 NOTE — MR AVS SNAPSHOT
After Visit Summary   7/14/2017    Gay Trinidad    MRN: [id number]           Patient Information     Date Of Birth          1985        Visit Information        Provider Department      7/14/2017 11:30 AM Kayla Carlton MD [facility]        Today's Diagnoses     Supervision of normal first pregnancy, antepartum    -  1       Follow-ups after your visit        Your next 10 appointments already scheduled     Jul 18, 2017  1:20 PM CDT   ALAN For Women Only with Kenia Arana PT   [facility] ([facility])    [address]  [facility] [address]   [phone]            Jul 25, 2017  2:00 PM CDT   ESTABLISHED PRENATAL with Kayla Carlton MD   [facility] ([facility]    [address] [place] [id number] [phone]            Jul 27, 2017  9:30 AM CDT   ALAN For Women Only with Kenia Arana PT   [facility] ([facility])    [address]  [facility] [address]   [phone]            Aug 03, 2017 10:00 AM CDT   ESTABLISHED PRENATAL with Kayla Carlton MD   [facility] ([facility])    [address]  [facility] [id number] [phone]            Aug 09, 2017   Procedure with Kayla Carlton MD   [facility] Labor and Delivery (--)    [address]  [facility] [id number] [phone]              Who to contact     If you have questions or need follow up information about today's clinic visit or your schedule please contact [facility] directly at [phone].  Normal or non-critical lab and imaging results will be communicated to you by MyChart, letter or phone within 4 business days after the clinic has received the results. If you do not hear from us within 7 days, please contact the clinic through MyChart or phone. If you have a critical or abnormal lab result,  "we will notify you by phone as soon as possible.  Submit refill requests through [email or web address] or call your pharmacy and they will forward the refill request to us. Please allow 3 business days for your refill to be completed.          Additional Information About Your Visit        [employer] Information     [email or web address] gives you secure access to your electronic health record. If you see a primary care provider, you can also send messages to your care team and make appointments. If you have questions, please call your primary care clinic.  If you do not have a primary care provider, please call [phone] and they will assist you.        Care EveryWhere ID     This is your Care EveryWhere ID. This could be used by other organizations to access your [place] medical records  [id number]        Your Vitals Were     Pulse Height Last Period BMI (Body Mass Index)          84 5' 7\" (1.702 m) 11/03/2016 (Exact Date) 30.37 kg/m2         Blood Pressure from Last 3 Encounters:   07/14/17 120/70   06/30/17 118/66   06/16/17 94/60    Weight from Last 3 Encounters:   07/14/17 193 lb 14.4 oz (88 kg)   06/30/17 195 lb (88.5 kg)   06/16/17 192 lb 4.8 oz (87.2 kg)              We Performed the Following     Group B strep PCR          Today's Medication Changes          These changes are accurate as of: 7/14/17 12:29 PM.  If you have any questions, ask your nurse or doctor.               Stop taking these medicines if you haven't already. Please contact your care team if you have questions.     COLACE PO   Stopped by:  Kayla Carlton MD           ondansetron 4 MG ODT tab   Commonly known as:  ZOFRAN ODT   Stopped by:  Kayla Carlton MD           polyethylene glycol powder   Commonly known as:  MIRALAX/GLYCOLAX   Stopped by:  Kayla Carlton MD                    Primary Care Provider Office Phone # Fax #    Brittaney FRANCIS Mata [phone]       [facility] [address] [place] [address]        Equal Access to Services  "    ALAN MCHUGH : Hadii aad anson venus Smith, wasocratesda luqadaha, qaybta kaalmada alejandraterrancemarcia, waxdelfino breann tomlinsonchristellekwaku rodriguez. So [facility] [phone].    ATENCIÓN: Si habla español, tiene a salazar disposición servicios gratuitos de asistencia lingüística. Llame al [phone].    We comply with applicable federal civil rights laws and [place] laws. We do not discriminate on the basis of race, color, national origin, age, disability sex, sexual orientation or gender identity.            Thank you!     Thank you for choosing [facility]  for your care. Our goal is always to provide you with excellent care. Hearing back from our patients is one way we can continue to improve our services. Please take a few minutes to complete the written survey that you may receive in the mail after your visit with us. Thank you!             Your Updated Medication List - Protect others around you: Learn how to safely use, store and throw away your medicines at www.disposemymeds.org.          This list is accurate as of: 7/14/17 12:29 PM.  Always use your most recent med list.                   Brand Name Dispense Instructions for use Diagnosis    FISH OIL OMEGA-3 1000 MG Caps           PRENATAL VIT-FE BISG-FA-DHA PO

## 2017-07-15 LAB
GP B STREP DNA SPEC QL NAA+PROBE: NORMAL
SPECIMEN SOURCE: NORMAL

## 2017-07-18 ENCOUNTER — THERAPY VISIT (OUTPATIENT)
Dept: PHYSICAL THERAPY | Facility: CLINIC | Age: 32
End: 2017-07-18
Payer: COMMERCIAL

## 2017-07-18 DIAGNOSIS — Z33.1 PREGNANCY, INCIDENTAL: ICD-10-CM

## 2017-07-18 DIAGNOSIS — M53.3 SACROILIAC JOINT PAIN: ICD-10-CM

## 2017-07-18 DIAGNOSIS — S33.8XXA: ICD-10-CM

## 2017-07-18 PROCEDURE — 97110 THERAPEUTIC EXERCISES: CPT | Mod: GP | Performed by: PHYSICAL THERAPIST

## 2017-07-18 PROCEDURE — 97140 MANUAL THERAPY 1/> REGIONS: CPT | Mod: GP | Performed by: PHYSICAL THERAPIST

## 2017-07-25 ENCOUNTER — PRENATAL OFFICE VISIT (OUTPATIENT)
Dept: OBGYN | Facility: CLINIC | Age: 32
End: 2017-07-25
Payer: COMMERCIAL

## 2017-07-25 VITALS
WEIGHT: 196.5 LBS | SYSTOLIC BLOOD PRESSURE: 110 MMHG | DIASTOLIC BLOOD PRESSURE: 64 MMHG | BODY MASS INDEX: 30.78 KG/M2 | HEART RATE: 68 BPM

## 2017-07-25 DIAGNOSIS — Z34.00 SUPERVISION OF NORMAL FIRST PREGNANCY, ANTEPARTUM: Primary | ICD-10-CM

## 2017-07-25 PROCEDURE — 99207 ZZC PRENATAL VISIT: CPT | Performed by: OBSTETRICS & GYNECOLOGY

## 2017-07-25 RX ORDER — BREAST PUMP
1 EACH MISCELLANEOUS PRN
Qty: 1 EACH | Refills: 0 | Status: SHIPPED | OUTPATIENT
Start: 2017-07-25 | End: 2018-10-08

## 2017-07-25 NOTE — NURSING NOTE
"Chief Complaint   Patient presents with     Prenatal Care     37 weeks 5 days, patient has been having cramping and pressure. No c/o vaginal bleeding       Initial /64 (BP Location: Right arm, Patient Position: Chair, Cuff Size: Adult Large)  Pulse 68  Wt 196 lb 8 oz (89.1 kg)  LMP 11/03/2016 (Exact Date)  BMI 30.78 kg/m2 Estimated body mass index is 30.78 kg/(m^2) as calculated from the following:    Height as of 7/14/17: 5' 7\" (1.702 m).    Weight as of this encounter: 196 lb 8 oz (89.1 kg).  Medication Reconciliation: complete     Cindy Webb CMA      "

## 2017-07-25 NOTE — PROGRESS NOTES
Pre-Op History and Physical   2017  Gay Trinidad  [id number]      HPI: Gay Trinidad is a 32 year old  at 37w5d by LMP c/w LMP c/w 8w0d . She plans an elective primary  section on 17 at 40w GA.     She states that she is feeling well today overall. Cramping occasionally, not severe. Good fetal movement. No LoF, VB. Would like to proceed with elective primary  section. Requests prescription for breast pump.She denies fever, HA, blurred vision, Nausea, vomiting, CP, SOB, abdominal pain, constipation, diarrhea, vaginal bleeding, LOF, abnormal vaginal discharge, and acute swelling.  +FM.      She has previously undergone general anesthesia and was slow to wake up, otherwise no concerns with anesthesia. Did not tolerate percocet well. Requests to try norco if she needs post-op pain relief.     OBHX:   Obstetric History       T0      L0     SAB0   TAB0   Ectopic0   Multiple0   Live Births0       # Outcome Date GA Lbr Waqas/2nd Weight Sex Delivery Anes PTL Lv   1 Current                   MedicalHX:   Past Medical History:   Diagnosis Date     Anxiety     in High school     Depression     in High school       SurgicalHX:   Past Surgical History:   Procedure Laterality Date     ADENOIDECTOMY  2009     CHOLECYSTECTOMY       HC TOOTH EXTRACTION W/FORCEP         Medications:     Current Outpatient Prescriptions on File Prior to Visit:  Omega-3 Fatty Acids (FISH OIL OMEGA-3) 1000 MG CAPS    PRENATAL VIT-FE BISG-FA-DHA PO      No current facility-administered medications on file prior to visit.     Allergies:  No Known Allergies    FamilyHX:  Family History   Problem Relation Age of Onset     Rheumatoid Arthritis Mother 50     Family History Negative Father      Family History Negative Sister      Type 2 Diabetes Maternal Grandfather      Lung Cancer Maternal Grandfather      smoker     Thyroid Disease Maternal Grandmother      CEREBROVASCULAR DISEASE Maternal  Grandmother 75     Breast Cancer Paternal Grandmother      HEART DISEASE Paternal Grandfather        SocialHX:   Social History     Social History     Marital status:      Spouse name: N/A     Number of children: N/A     Years of education: N/A     Social History Main Topics     Smoking status: Never Smoker     Smokeless tobacco: Never Used     Alcohol use No      Comment: not during pregnancy     Drug use: No     Sexual activity: Yes     Partners: Male     Other Topics Concern      Service No     Blood Transfusions No     Caffeine Concern Yes     Occupational Exposure Yes     Hobby Hazards No     Sleep Concern Yes     Stress Concern No     Weight Concern No     Special Diet No     Back Care No     Exercise No     Bike Helmet Yes     Seat Belt Yes     Self-Exams No     Social History Narrative       ROS: 10-point ROS negative except as in HPI    Physical Exam:  Vitals:    07/25/17 1426   BP: 110/64   BP Location: Right arm   Patient Position: Chair   Cuff Size: Adult Large   Pulse: 68   Weight: 196 lb 8 oz (89.1 kg)     GEN: resting comfortably in bed, in NAD   CVS: RRR, no murmur appreciated   PULMONARY: CTAB, no increased work of breathing, no cough/wheeze   ABDOMEN: soft, gravid, non-tender, non-distended  EXTREMITIES: no edema, non-tender to palpation  CVX: deferred  Presentation: cephalic by Leopolds  EFW: 6.5lb lbs  Doptones: 115    Labs:   No results found for this or any previous visit (from the past 24 hour(s)).    Lab Results   Component Value Date    ABO A 12/28/2016    RH  Pos 12/28/2016    AS Neg 12/28/2016    HEPBANG Nonreactive 12/28/2016    TREPAB Negative 05/12/2017    HGB 11.7 05/12/2017       GBS Status:   Lab Results   Component Value Date    GBS  07/14/2017     Negative  No GBS DNA detected, presumed negative for GBS or number of bacteria may be   below the limit of detection of the assay.   Assay performed on incubated broth culture of specimen using [employer] real-time   PCR.          A/P: Gay Triniadd is a 32 year old female  at 37w5d by LMP c/w 8w0d US, here for routine OB care and pre-operative physical exam. Pregnancy c/b arrhytmia detected on anatomy scan, normal heart rhythm on 2 subsequent US.    1) PNC: A+, RI. 1st TM screen within normal limits. AFP normal. Anatomy scan normal, girl. GCT 83. S/p Tdap.  2) Delivery method: elective c/s scheduled for , can cancel if patient decides on attempting vaginal delivery  3) Vaginal pain: improved somewhat with support belt and stretches,s/p physical therapy appointment, working on exercises and icing.  4) return to clinic weekly through delivery.      Kayla Carlton MD  Obstetrics and Gynecology

## 2017-07-25 NOTE — MR AVS SNAPSHOT
After Visit Summary   7/25/2017    Gay Trinidad    MRN: [id number]           Patient Information     Date Of Birth          1985        Visit Information        Provider Department      7/25/2017 2:00 PM Kayla Carlton MD [facility]        Today's Diagnoses     Supervision of normal first pregnancy, antepartum    -  1       Follow-ups after your visit        Your next 10 appointments already scheduled     Aug 03, 2017 10:00 AM CDT   ESTABLISHED PRENATAL with Kayla Carlton MD   [facility]    [address]  [facility] [address] [phone]            Aug 09, 2017   Procedure with Kayla Carlton MD   [facility] Labor and Delivery (--)    [address]  [facility] [id number] [phone]              Who to contact     If you have questions or need follow up information about today's clinic visit or your schedule please contact [facility] directly at [phone].  Normal or non-critical lab and imaging results will be communicated to you by Kahnoodlehart, letter or phone within 4 business days after the clinic has received the results. If you do not hear from us within 7 days, please contact the clinic through Kahnoodlehart or phone. If you have a critical or abnormal lab result, we will notify you by phone as soon as possible.  Submit refill requests through [employer] or call your pharmacy and they will forward the refill request to us. Please allow 3 business days for your refill to be completed.          Additional Information About Your Visit        K[employer]lehart Information     [employer] gives you secure access to your electronic health record. If you see a primary care provider, you can also send messages to your care team and make appointments. If you have questions, please call your primary care clinic.  If you do not have a primary care provider, please call [phone] and  they will assist you.        Care EveryWhere ID     This is your Care EveryWhere ID. This could be used by other organizations to access your [place] medical records  [id number]        Your Vitals Were     Pulse Last Period BMI (Body Mass Index)             68 11/03/2016 (Exact Date) 30.78 kg/m2          Blood Pressure from Last 3 Encounters:   07/25/17 110/64   07/14/17 120/70   06/30/17 118/66    Weight from Last 3 Encounters:   07/25/17 196 lb 8 oz (89.1 kg)   07/14/17 193 lb 14.4 oz (88 kg)   06/30/17 195 lb (88.5 kg)              Today, you had the following     No orders found for display         Today's Medication Changes          These changes are accurate as of: 7/25/17  3:08 PM.  If you have any questions, ask your nurse or doctor.               Start taking these medicines.        Dose/Directions    breast pump Misc   Used for:  Supervision of normal first pregnancy, antepartum   Started by:  Kayla Carlton MD        Dose:  1 each   1 each as needed   Quantity:  1 each   Refills:  0            Where to get your medicines      Some of these will need a paper prescription and others can be bought over the counter.  Ask your nurse if you have questions.     Bring a paper prescription for each of these medications     breast pump Misc                Primary Care Provider Office Phone # Fax #    Brittaney Mata [phone]       [facility] [address] [place] [address]        Equal Access to Services     [facility] AH: Hadii aad ku hadasho Somejiaali, waaxda luqadaha, qaybta kaalmada adeegyada, charu rodriguez. So [facility] [phone].    ATENCIÓN: Si habla español, tiene a salazar disposición servicios gratuitos de asistencia lingüística. Llame al [phone].    We comply with applicable federal civil rights laws and [place] laws. We do not discriminate on the basis of race, color, national origin, age, disability sex, sexual orientation or gender  identity.            Thank you!     Thank you for choosing [facility]  for your care. Our goal is always to provide you with excellent care. Hearing back from our patients is one way we can continue to improve our services. Please take a few minutes to complete the written survey that you may receive in the mail after your visit with us. Thank you!             Your Updated Medication List - Protect others around you: Learn how to safely use, store and throw away your medicines at www.disposemymeds.org.          This list is accurate as of: 7/25/17  3:08 PM.  Always use your most recent med list.                   Brand Name Dispense Instructions for use Diagnosis    breast pump Misc     1 each    1 each as needed    Supervision of normal first pregnancy, antepartum       FISH OIL OMEGA-3 1000 MG Caps           PRENATAL VIT-FE BISG-FA-DHA PO

## 2017-08-03 ENCOUNTER — PRENATAL OFFICE VISIT (OUTPATIENT)
Dept: OBGYN | Facility: CLINIC | Age: 32
End: 2017-08-03
Payer: COMMERCIAL

## 2017-08-03 VITALS
SYSTOLIC BLOOD PRESSURE: 124 MMHG | BODY MASS INDEX: 30.76 KG/M2 | HEART RATE: 92 BPM | WEIGHT: 196 LBS | HEIGHT: 67 IN | DIASTOLIC BLOOD PRESSURE: 78 MMHG

## 2017-08-03 DIAGNOSIS — R51.9 HEADACHE IN PREGNANCY, THIRD TRIMESTER: Primary | ICD-10-CM

## 2017-08-03 DIAGNOSIS — O26.893 HEADACHE IN PREGNANCY, THIRD TRIMESTER: Primary | ICD-10-CM

## 2017-08-03 PROCEDURE — 99207 ZZC PRENATAL VISIT: CPT | Performed by: OBSTETRICS & GYNECOLOGY

## 2017-08-03 RX ORDER — METOCLOPRAMIDE 5 MG/1
5 TABLET ORAL 4 TIMES DAILY PRN
Qty: 15 TABLET | Refills: 1 | Status: SHIPPED | OUTPATIENT
Start: 2017-08-03 | End: 2017-09-05

## 2017-08-03 NOTE — MR AVS SNAPSHOT
After Visit Summary   8/3/2017    Gay Cummins    MRN: [id number]           Patient Information     Date Of Birth          1985        Visit Information        Provider Department      8/3/2017 2:15 PM Kayla Carlton MD [facility]        Today's Diagnoses     Headache in pregnancy, third trimester    -  1       Follow-ups after your visit        Your next 10 appointments already scheduled     Aug 09, 2017   Procedure with Kayla Carlton MD   [facility] Labor and Delivery (--)    [address]  [facility] [address]   [phone]            Aug 24, 2017 11:00 AM CDT   SHORT with [facility] MD   [facility]    [address]  [facility] [address] [phone]            Sep 14, 2017  1:00 PM CDT   Post Partum with Kyala Carlton MD   [facility]    [address]  [facility] [address] [phone]            Sep 19, 2017  2:00 PM CDT   Post Partum with Sally Garcia MD   [facility]    [address]  [facility] [id number] [phone]              Future tests that were ordered for you today     Open Future Orders        Priority Expected Expires Ordered    ABO/Rh type and screen Routine 8/8/2017 8/9/2017 8/2/2017    Hemoglobin Routine 8/8/2017 8/9/2017 8/2/2017            Who to contact     If you have questions or need follow up information about today's clinic visit or your schedule please contact [facility] directly at [phone].  Normal or non-critical lab and imaging results will be communicated to you by MyChart, letter or phone within 4 business days after the clinic has received the results. If you do not hear from us within 7 days, please contact the clinic through MyChart or phone. If you have a critical or abnormal lab result, we will notify you  "by phone as soon as possible.  Submit refill requests through [email or web address] or call your pharmacy and they will forward the refill request to us. Please allow 3 business days for your refill to be completed.          Additional Information About Your Visit        [employer] Information     [email or web address] gives you secure access to your electronic health record. If you see a primary care provider, you can also send messages to your care team and make appointments. If you have questions, please call your primary care clinic.  If you do not have a primary care provider, please call [phone] and they will assist you.        Care EveryWhere ID     This is your Care EveryWhere ID. This could be used by other organizations to access your [place] medical records  [id number]        Your Vitals Were     Pulse Height Last Period BMI (Body Mass Index)          92 5' 7\" (1.702 m) 11/03/2016 (Exact Date) 30.7 kg/m2         Blood Pressure from Last 3 Encounters:   08/03/17 124/78   07/25/17 110/64   07/14/17 120/70    Weight from Last 3 Encounters:   08/03/17 196 lb (88.9 kg)   07/25/17 196 lb 8 oz (89.1 kg)   07/14/17 193 lb 14.4 oz (88 kg)              Today, you had the following     No orders found for display         Today's Medication Changes          These changes are accurate as of: 8/3/17  6:10 PM.  If you have any questions, ask your nurse or doctor.               Start taking these medicines.        Dose/Directions    metoclopramide 5 MG tablet   Commonly known as:  REGLAN   Used for:  Headache in pregnancy, third trimester   Started by:  Kayla Carlton MD        Dose:  5 mg   Take 1 tablet (5 mg) by mouth 4 times daily as needed For headaches   Quantity:  15 tablet   Refills:  1            Where to get your medicines      These medications were sent to [facility]/pharmacy #[id number] - [place], [place] - [address]  [address] [facility] [address]     Phone:  [phone]     metoclopramide 5 MG tablet                " Primary Care Provider Office Phone # Fax #    Brittaney Mata [phone]       [facility] [address] [place] [address]        Equal Access to Services     SARIAHZIA RAMOSTERESA : Hadii aad ku haddionteo Soomaali, waaxda luqadaha, qaybta kaalmada adeegyada, charu iversonsakshi michael. So [facility] [phone].    ATENCIÓN: Si habla español, tiene a salazar disposición servicios gratuitos de asistencia lingüística. Llame al [phone].    We comply with applicable federal civil rights laws and [place] laws. We do not discriminate on the basis of race, color, national origin, age, disability sex, sexual orientation or gender identity.            Thank you!     Thank you for choosing [facility]  for your care. Our goal is always to provide you with excellent care. Hearing back from our patients is one way we can continue to improve our services. Please take a few minutes to complete the written survey that you may receive in the mail after your visit with us. Thank you!             Your Updated Medication List - Protect others around you: Learn how to safely use, store and throw away your medicines at www.disposemymeds.org.          This list is accurate as of: 8/3/17  6:10 PM.  Always use your most recent med list.                   Brand Name Dispense Instructions for use Diagnosis    breast pump Misc     1 each    1 each as needed    Supervision of normal first pregnancy, antepartum       FISH OIL OMEGA-3 1000 MG Caps           metoclopramide 5 MG tablet    REGLAN    15 tablet    Take 1 tablet (5 mg) by mouth 4 times daily as needed For headaches    Headache in pregnancy, third trimester       PRENATAL VIT-FE BISG-FA-DHA PO

## 2017-08-03 NOTE — PROGRESS NOTES
"Routine OB Visit:    S: Continued sharp shooting vaginal pain. Rare contractions. Good fetal movement. No LoF, VB. Plans elective primary c/s.    O: /78  Pulse 92  Ht 5' 7\" (1.702 m)  Wt 196 lb (88.9 kg)  LMP 2016 (Exact Date)  BMI 30.7 kg/m2   Gen: resting comfortably, in NAD  See Prenatal flowsheet    A: Gay Trinidad is a 32 year old female  at 39w0d, here for routine OB care. Pregnancy c/b arrhytmia detected on anatomy scan, normal heart rhythm on 2 subsequent US.    P:   1) PNC: A+, RI. 1st TM screen within normal limits. AFP normal. Anatomy scan normal, girl. GCT 83. S/p Tdap.  2) Delivery method: elective c/s scheduled, can cancel if patient decides on attempting vaginal delivery  3) Vaginal pain: improved somewhat with support belt and stretches,s/p physical therapy appointment, working on exercises and icing.  4) return to clinic weekly through delivery.      Kayla Carlton MD  Obstetrics and Gynecology     "

## 2017-08-03 NOTE — NURSING NOTE
"Chief Complaint   Patient presents with     Prenatal Care     39+0       Initial /78  Pulse 92  Ht 5' 7\" (1.702 m)  Wt 196 lb (88.9 kg)  LMP 2016 (Exact Date)  BMI 30.7 kg/m2 Estimated body mass index is 30.7 kg/(m^2) as calculated from the following:    Height as of this encounter: 5' 7\" (1.702 m).    Weight as of this encounter: 196 lb (88.9 kg).  BP completed using cuff size: regular        The following HM Due: NONE      The following patient reported/Care Every where data was sent to:  P ABSTRACT QUALITY INITIATIVES [[id number]]  NA     patient has appointment for today    Sumi MCLEAN               "

## 2017-08-04 NOTE — H&P (VIEW-ONLY)
Pre-Op History and Physical   2017  Gay Trinidad  [id number]      HPI: Gay Trinidad is a 32 year old  at 37w5d by LMP c/w LMP c/w 8w0d . She plans an elective primary  section on 17 at 40w GA.     She states that she is feeling well today overall. Cramping occasionally, not severe. Good fetal movement. No LoF, VB. Would like to proceed with elective primary  section. Requests prescription for breast pump.She denies fever, HA, blurred vision, Nausea, vomiting, CP, SOB, abdominal pain, constipation, diarrhea, vaginal bleeding, LOF, abnormal vaginal discharge, and acute swelling.  +FM.      She has previously undergone general anesthesia and was slow to wake up, otherwise no concerns with anesthesia. Did not tolerate percocet well. Requests to try norco if she needs post-op pain relief.     OBHX:   Obstetric History       T0      L0     SAB0   TAB0   Ectopic0   Multiple0   Live Births0       # Outcome Date GA Lbr Waqas/2nd Weight Sex Delivery Anes PTL Lv   1 Current                   MedicalHX:   Past Medical History:   Diagnosis Date     Anxiety     in High school     Depression     in High school       SurgicalHX:   Past Surgical History:   Procedure Laterality Date     ADENOIDECTOMY  2009     CHOLECYSTECTOMY       HC TOOTH EXTRACTION W/FORCEP         Medications:     Current Outpatient Prescriptions on File Prior to Visit:  Omega-3 Fatty Acids (FISH OIL OMEGA-3) 1000 MG CAPS    PRENATAL VIT-FE BISG-FA-DHA PO      No current facility-administered medications on file prior to visit.     Allergies:  No Known Allergies    FamilyHX:  Family History   Problem Relation Age of Onset     Rheumatoid Arthritis Mother 50     Family History Negative Father      Family History Negative Sister      Type 2 Diabetes Maternal Grandfather      Lung Cancer Maternal Grandfather      smoker     Thyroid Disease Maternal Grandmother      CEREBROVASCULAR DISEASE Maternal  Grandmother 75     Breast Cancer Paternal Grandmother      HEART DISEASE Paternal Grandfather        SocialHX:   Social History     Social History     Marital status:      Spouse name: N/A     Number of children: N/A     Years of education: N/A     Social History Main Topics     Smoking status: Never Smoker     Smokeless tobacco: Never Used     Alcohol use No      Comment: not during pregnancy     Drug use: No     Sexual activity: Yes     Partners: Male     Other Topics Concern      Service No     Blood Transfusions No     Caffeine Concern Yes     Occupational Exposure Yes     Hobby Hazards No     Sleep Concern Yes     Stress Concern No     Weight Concern No     Special Diet No     Back Care No     Exercise No     Bike Helmet Yes     Seat Belt Yes     Self-Exams No     Social History Narrative       ROS: 10-point ROS negative except as in HPI    Physical Exam:  Vitals:    07/25/17 1426   BP: 110/64   BP Location: Right arm   Patient Position: Chair   Cuff Size: Adult Large   Pulse: 68   Weight: 196 lb 8 oz (89.1 kg)     GEN: resting comfortably in bed, in NAD   CVS: RRR, no murmur appreciated   PULMONARY: CTAB, no increased work of breathing, no cough/wheeze   ABDOMEN: soft, gravid, non-tender, non-distended  EXTREMITIES: no edema, non-tender to palpation  CVX: deferred  Presentation: cephalic by Leopolds  EFW: 6.5lb lbs  Doptones: 115    Labs:   No results found for this or any previous visit (from the past 24 hour(s)).    Lab Results   Component Value Date    ABO A 12/28/2016    RH  Pos 12/28/2016    AS Neg 12/28/2016    HEPBANG Nonreactive 12/28/2016    TREPAB Negative 05/12/2017    HGB 11.7 05/12/2017       GBS Status:   Lab Results   Component Value Date    GBS  07/14/2017     Negative  No GBS DNA detected, presumed negative for GBS or number of bacteria may be   below the limit of detection of the assay.   Assay performed on incubated broth culture of specimen using [address] real-time   PCR.          A/P: Gay Trinidad is a 32 year old female  at 37w5d by LMP c/w 8w0d US, here for routine OB care and pre-operative physical exam. Pregnancy c/b arrhytmia detected on anatomy scan, normal heart rhythm on 2 subsequent US.    1) PNC: A+, RI. 1st TM screen within normal limits. AFP normal. Anatomy scan normal, girl. GCT 83. S/p Tdap.  2) Delivery method: elective c/s scheduled for , can cancel if patient decides on attempting vaginal delivery  3) Vaginal pain: improved somewhat with support belt and stretches,s/p physical therapy appointment, working on exercises and icing.  4) return to clinic weekly through delivery.      Kayla Carlton MD  Obstetrics and Gynecology

## 2017-08-07 NOTE — PHARMACY-ADMISSION MEDICATION HISTORY
Med rec complete  For preadmitting status per this writer: called patient to verify meds     Prior to Admission medications    Medication Sig Last Dose Taking? Auth Provider   Omega-3 Fatty Acids (FISH OIL OMEGA-3) 1000 MG CAPS Take 1 capsule by mouth daily   Yes Reported, Patient   PRENATAL VIT-FE BISG-FA-DHA PO Take 1 tablet by mouth daily   Yes Reported, Patient   metoclopramide (REGLAN) 5 MG tablet Take 1 tablet (5 mg) by mouth 4 times daily as needed For headaches   Kayla Carlton MD   Misc. Devices (BREAST PUMP) MISC 1 each as needed   Kayla Carlton MD

## 2017-08-08 ENCOUNTER — ANESTHESIA (OUTPATIENT)
Dept: SURGERY | Facility: CLINIC | Age: 32
End: 2017-08-08
Payer: COMMERCIAL

## 2017-08-08 ENCOUNTER — ANESTHESIA EVENT (OUTPATIENT)
Dept: SURGERY | Facility: CLINIC | Age: 32
End: 2017-08-08
Payer: COMMERCIAL

## 2017-08-08 ENCOUNTER — HOSPITAL ENCOUNTER (OUTPATIENT)
Dept: LAB | Facility: CLINIC | Age: 32
Discharge: HOME | End: 2017-08-08
Attending: OBSTETRICS & GYNECOLOGY | Admitting: OBSTETRICS & GYNECOLOGY
Payer: COMMERCIAL

## 2017-08-08 ENCOUNTER — HOSPITAL ENCOUNTER (INPATIENT)
Facility: CLINIC | Age: 32
LOS: 3 days | Discharge: HOME | End: 2017-08-11
Attending: FAMILY MEDICINE | Admitting: OBSTETRICS & GYNECOLOGY
Payer: COMMERCIAL

## 2017-08-08 ENCOUNTER — TELEPHONE (OUTPATIENT)
Dept: OBGYN | Facility: CLINIC | Age: 32
End: 2017-08-08

## 2017-08-08 DIAGNOSIS — Z01.812 PRE-OPERATIVE LABORATORY EXAMINATION: ICD-10-CM

## 2017-08-08 DIAGNOSIS — Z98.891 S/P CESAREAN SECTION: Primary | ICD-10-CM

## 2017-08-08 LAB
A1 MICROGLOB PLACENTAL VAG QL: POSITIVE
ABO + RH BLD: NORMAL
ABO + RH BLD: NORMAL
BLD GP AB SCN SERPL QL: NORMAL
BLOOD BANK CMNT PATIENT-IMP: NORMAL
HGB BLD-MCNC: 13.7 G/DL (ref 11.7–15.7)
SPECIMEN EXP DATE BLD: NORMAL

## 2017-08-08 PROCEDURE — 25000128 H RX IP 250 OP 636: Performed by: ANESTHESIOLOGY

## 2017-08-08 PROCEDURE — C1765 ADHESION BARRIER: HCPCS | Performed by: FAMILY MEDICINE

## 2017-08-08 PROCEDURE — 25000125 ZZHC RX 250: Performed by: NURSE ANESTHETIST, CERTIFIED REGISTERED

## 2017-08-08 PROCEDURE — 59025 FETAL NON-STRESS TEST: CPT

## 2017-08-08 PROCEDURE — 36000058 ZZH SURGERY LEVEL 3 EA 15 ADDTL MIN: Performed by: FAMILY MEDICINE

## 2017-08-08 PROCEDURE — 25000132 ZZH RX MED GY IP 250 OP 250 PS 637: Performed by: FAMILY MEDICINE

## 2017-08-08 PROCEDURE — 59510 CESAREAN DELIVERY: CPT | Performed by: FAMILY MEDICINE

## 2017-08-08 PROCEDURE — 71000013 ZZH RECOVERY PHASE 1 LEVEL 1 EA ADDTL HR: Performed by: FAMILY MEDICINE

## 2017-08-08 PROCEDURE — 36000056 ZZH SURGERY LEVEL 3 1ST 30 MIN: Performed by: FAMILY MEDICINE

## 2017-08-08 PROCEDURE — 25000128 H RX IP 250 OP 636: Performed by: FAMILY MEDICINE

## 2017-08-08 PROCEDURE — 71000012 ZZH RECOVERY PHASE 1 LEVEL 1 FIRST HR: Performed by: FAMILY MEDICINE

## 2017-08-08 PROCEDURE — 12000029 ZZH R&B OB INTERMEDIATE

## 2017-08-08 PROCEDURE — 37000009 ZZH ANESTHESIA TECHNICAL FEE, EACH ADDTL 15 MIN: Performed by: FAMILY MEDICINE

## 2017-08-08 PROCEDURE — 36415 COLL VENOUS BLD VENIPUNCTURE: CPT | Performed by: OBSTETRICS & GYNECOLOGY

## 2017-08-08 PROCEDURE — 25000128 H RX IP 250 OP 636: Performed by: NURSE ANESTHETIST, CERTIFIED REGISTERED

## 2017-08-08 PROCEDURE — 84112 EVAL AMNIOTIC FLUID PROTEIN: CPT | Performed by: FAMILY MEDICINE

## 2017-08-08 PROCEDURE — 37000008 ZZH ANESTHESIA TECHNICAL FEE, 1ST 30 MIN: Performed by: FAMILY MEDICINE

## 2017-08-08 PROCEDURE — 86850 RBC ANTIBODY SCREEN: CPT | Performed by: OBSTETRICS & GYNECOLOGY

## 2017-08-08 PROCEDURE — 25000125 ZZHC RX 250

## 2017-08-08 PROCEDURE — 27210794 ZZH OR GENERAL SUPPLY STERILE: Performed by: FAMILY MEDICINE

## 2017-08-08 PROCEDURE — 99215 OFFICE O/P EST HI 40 MIN: CPT | Mod: 25

## 2017-08-08 PROCEDURE — 86901 BLOOD TYPING SEROLOGIC RH(D): CPT | Performed by: OBSTETRICS & GYNECOLOGY

## 2017-08-08 PROCEDURE — 85018 HEMOGLOBIN: CPT | Performed by: OBSTETRICS & GYNECOLOGY

## 2017-08-08 PROCEDURE — 86900 BLOOD TYPING SEROLOGIC ABO: CPT | Performed by: OBSTETRICS & GYNECOLOGY

## 2017-08-08 PROCEDURE — 25000125 ZZHC RX 250: Performed by: ANESTHESIOLOGY

## 2017-08-08 RX ORDER — MORPHINE SULFATE 1 MG/ML
INJECTION, SOLUTION EPIDURAL; INTRATHECAL; INTRAVENOUS PRN
Status: DISCONTINUED | OUTPATIENT
Start: 2017-08-08 | End: 2017-08-08

## 2017-08-08 RX ORDER — OXYTOCIN/0.9 % SODIUM CHLORIDE 30/500 ML
340 PLASTIC BAG, INJECTION (ML) INTRAVENOUS CONTINUOUS PRN
Status: DISCONTINUED | OUTPATIENT
Start: 2017-08-08 | End: 2017-08-11 | Stop reason: HOSPADM

## 2017-08-08 RX ORDER — ONDANSETRON 2 MG/ML
INJECTION INTRAMUSCULAR; INTRAVENOUS PRN
Status: DISCONTINUED | OUTPATIENT
Start: 2017-08-08 | End: 2017-08-08

## 2017-08-08 RX ORDER — DIPHENHYDRAMINE HCL 25 MG
25 CAPSULE ORAL EVERY 6 HOURS PRN
Status: DISCONTINUED | OUTPATIENT
Start: 2017-08-08 | End: 2017-08-11 | Stop reason: HOSPADM

## 2017-08-08 RX ORDER — EPHEDRINE SULFATE 50 MG/ML
5 INJECTION, SOLUTION INTRAMUSCULAR; INTRAVENOUS; SUBCUTANEOUS
Status: DISCONTINUED | OUTPATIENT
Start: 2017-08-08 | End: 2017-08-11 | Stop reason: CLARIF

## 2017-08-08 RX ORDER — LIDOCAINE 40 MG/G
CREAM TOPICAL
Status: DISCONTINUED | OUTPATIENT
Start: 2017-08-08 | End: 2017-08-11 | Stop reason: HOSPADM

## 2017-08-08 RX ORDER — ONDANSETRON 2 MG/ML
4 INJECTION INTRAMUSCULAR; INTRAVENOUS EVERY 30 MIN PRN
Status: DISCONTINUED | OUTPATIENT
Start: 2017-08-08 | End: 2017-08-11 | Stop reason: CLARIF

## 2017-08-08 RX ORDER — HYDROCORTISONE 2.5 %
CREAM (GRAM) TOPICAL 3 TIMES DAILY PRN
Status: DISCONTINUED | OUTPATIENT
Start: 2017-08-08 | End: 2017-08-11 | Stop reason: HOSPADM

## 2017-08-08 RX ORDER — ONDANSETRON 2 MG/ML
4 INJECTION INTRAMUSCULAR; INTRAVENOUS EVERY 6 HOURS PRN
Status: DISCONTINUED | OUTPATIENT
Start: 2017-08-08 | End: 2017-08-11 | Stop reason: HOSPADM

## 2017-08-08 RX ORDER — ONDANSETRON 2 MG/ML
4 INJECTION INTRAMUSCULAR; INTRAVENOUS EVERY 6 HOURS PRN
Status: DISCONTINUED | OUTPATIENT
Start: 2017-08-08 | End: 2017-08-08

## 2017-08-08 RX ORDER — DEXTROSE, SODIUM CHLORIDE, SODIUM LACTATE, POTASSIUM CHLORIDE, AND CALCIUM CHLORIDE 5; .6; .31; .03; .02 G/100ML; G/100ML; G/100ML; G/100ML; G/100ML
INJECTION, SOLUTION INTRAVENOUS CONTINUOUS
Status: DISCONTINUED | OUTPATIENT
Start: 2017-08-08 | End: 2017-08-11 | Stop reason: HOSPADM

## 2017-08-08 RX ORDER — CITRIC ACID/SODIUM CITRATE 334-500MG
30 SOLUTION, ORAL ORAL
Status: COMPLETED | OUTPATIENT
Start: 2017-08-08 | End: 2017-08-08

## 2017-08-08 RX ORDER — CEFAZOLIN SODIUM 1 G/3ML
1 INJECTION, POWDER, FOR SOLUTION INTRAMUSCULAR; INTRAVENOUS
Status: DISCONTINUED | OUTPATIENT
Start: 2017-08-08 | End: 2017-08-08 | Stop reason: HOSPADM

## 2017-08-08 RX ORDER — AMOXICILLIN 250 MG
1-2 CAPSULE ORAL 2 TIMES DAILY
Status: DISCONTINUED | OUTPATIENT
Start: 2017-08-08 | End: 2017-08-11 | Stop reason: HOSPADM

## 2017-08-08 RX ORDER — DIPHENHYDRAMINE HYDROCHLORIDE 50 MG/ML
25 INJECTION INTRAMUSCULAR; INTRAVENOUS EVERY 6 HOURS PRN
Status: DISCONTINUED | OUTPATIENT
Start: 2017-08-08 | End: 2017-08-11 | Stop reason: HOSPADM

## 2017-08-08 RX ORDER — OXYCODONE HYDROCHLORIDE 5 MG/1
5-10 TABLET ORAL
Status: DISCONTINUED | OUTPATIENT
Start: 2017-08-08 | End: 2017-08-11 | Stop reason: HOSPADM

## 2017-08-08 RX ORDER — FENTANYL CITRATE 50 UG/ML
100 INJECTION, SOLUTION INTRAMUSCULAR; INTRAVENOUS
Status: DISCONTINUED | OUTPATIENT
Start: 2017-08-08 | End: 2017-08-08

## 2017-08-08 RX ORDER — SCOLOPAMINE TRANSDERMAL SYSTEM 1 MG/1
1 PATCH, EXTENDED RELEASE TRANSDERMAL
Status: COMPLETED | OUTPATIENT
Start: 2017-08-08 | End: 2017-08-08

## 2017-08-08 RX ORDER — NALOXONE HYDROCHLORIDE 0.4 MG/ML
.1-.4 INJECTION, SOLUTION INTRAMUSCULAR; INTRAVENOUS; SUBCUTANEOUS
Status: DISCONTINUED | OUTPATIENT
Start: 2017-08-08 | End: 2017-08-11 | Stop reason: CLARIF

## 2017-08-08 RX ORDER — EPHEDRINE SULFATE 50 MG/ML
25 INJECTION, SOLUTION INTRAVENOUS
Status: COMPLETED | OUTPATIENT
Start: 2017-08-08 | End: 2017-08-08

## 2017-08-08 RX ORDER — ACETAMINOPHEN 325 MG/1
650 TABLET ORAL EVERY 4 HOURS PRN
Status: DISCONTINUED | OUTPATIENT
Start: 2017-08-11 | End: 2017-08-11 | Stop reason: HOSPADM

## 2017-08-08 RX ORDER — IBUPROFEN 400 MG/1
400-800 TABLET, FILM COATED ORAL EVERY 6 HOURS PRN
Status: DISCONTINUED | OUTPATIENT
Start: 2017-08-08 | End: 2017-08-11 | Stop reason: HOSPADM

## 2017-08-08 RX ORDER — ACETAMINOPHEN 325 MG/1
975 TABLET ORAL EVERY 8 HOURS
Status: DISCONTINUED | OUTPATIENT
Start: 2017-08-08 | End: 2017-08-11 | Stop reason: HOSPADM

## 2017-08-08 RX ORDER — KETOROLAC TROMETHAMINE 30 MG/ML
30 INJECTION, SOLUTION INTRAMUSCULAR; INTRAVENOUS EVERY 6 HOURS
Status: COMPLETED | OUTPATIENT
Start: 2017-08-08 | End: 2017-08-09

## 2017-08-08 RX ORDER — NALOXONE HYDROCHLORIDE 0.4 MG/ML
.1-.4 INJECTION, SOLUTION INTRAMUSCULAR; INTRAVENOUS; SUBCUTANEOUS
Status: DISCONTINUED | OUTPATIENT
Start: 2017-08-08 | End: 2017-08-11 | Stop reason: HOSPADM

## 2017-08-08 RX ORDER — PROMETHAZINE HYDROCHLORIDE 25 MG/ML
25 INJECTION INTRAMUSCULAR; INTRAVENOUS
Status: COMPLETED | OUTPATIENT
Start: 2017-08-08 | End: 2017-08-08

## 2017-08-08 RX ORDER — FENTANYL CITRATE 50 UG/ML
25-50 INJECTION, SOLUTION INTRAMUSCULAR; INTRAVENOUS
Status: DISCONTINUED | OUTPATIENT
Start: 2017-08-08 | End: 2017-08-11 | Stop reason: CLARIF

## 2017-08-08 RX ORDER — SODIUM CHLORIDE, SODIUM LACTATE, POTASSIUM CHLORIDE, CALCIUM CHLORIDE 600; 310; 30; 20 MG/100ML; MG/100ML; MG/100ML; MG/100ML
INJECTION, SOLUTION INTRAVENOUS CONTINUOUS
Status: DISCONTINUED | OUTPATIENT
Start: 2017-08-08 | End: 2017-08-11 | Stop reason: CLARIF

## 2017-08-08 RX ORDER — NALBUPHINE HYDROCHLORIDE 10 MG/ML
2.5-5 INJECTION, SOLUTION INTRAMUSCULAR; INTRAVENOUS; SUBCUTANEOUS EVERY 6 HOURS PRN
Status: DISCONTINUED | OUTPATIENT
Start: 2017-08-08 | End: 2017-08-11 | Stop reason: CLARIF

## 2017-08-08 RX ORDER — CEFAZOLIN SODIUM 2 G/100ML
2 INJECTION, SOLUTION INTRAVENOUS
Status: DISCONTINUED | OUTPATIENT
Start: 2017-08-08 | End: 2017-08-08 | Stop reason: HOSPADM

## 2017-08-08 RX ORDER — BISACODYL 10 MG
10 SUPPOSITORY, RECTAL RECTAL DAILY PRN
Status: DISCONTINUED | OUTPATIENT
Start: 2017-08-10 | End: 2017-08-11 | Stop reason: HOSPADM

## 2017-08-08 RX ORDER — METOCLOPRAMIDE HYDROCHLORIDE 5 MG/ML
10 INJECTION INTRAMUSCULAR; INTRAVENOUS
Status: COMPLETED | OUTPATIENT
Start: 2017-08-08 | End: 2017-08-08

## 2017-08-08 RX ORDER — HYDROMORPHONE HYDROCHLORIDE 1 MG/ML
.3-.5 INJECTION, SOLUTION INTRAMUSCULAR; INTRAVENOUS; SUBCUTANEOUS EVERY 30 MIN PRN
Status: DISCONTINUED | OUTPATIENT
Start: 2017-08-08 | End: 2017-08-11 | Stop reason: HOSPADM

## 2017-08-08 RX ORDER — OXYTOCIN/0.9 % SODIUM CHLORIDE 30/500 ML
PLASTIC BAG, INJECTION (ML) INTRAVENOUS
Status: COMPLETED
Start: 2017-08-08 | End: 2017-08-08

## 2017-08-08 RX ORDER — MISOPROSTOL 200 UG/1
400 TABLET ORAL
Status: DISCONTINUED | OUTPATIENT
Start: 2017-08-08 | End: 2017-08-11 | Stop reason: HOSPADM

## 2017-08-08 RX ORDER — LANOLIN 100 %
OINTMENT (GRAM) TOPICAL
Status: DISCONTINUED | OUTPATIENT
Start: 2017-08-08 | End: 2017-08-11 | Stop reason: HOSPADM

## 2017-08-08 RX ORDER — BUPIVACAINE HYDROCHLORIDE 7.5 MG/ML
INJECTION, SOLUTION INTRASPINAL PRN
Status: DISCONTINUED | OUTPATIENT
Start: 2017-08-08 | End: 2017-08-08

## 2017-08-08 RX ORDER — ONDANSETRON 4 MG/1
4 TABLET, ORALLY DISINTEGRATING ORAL EVERY 30 MIN PRN
Status: DISCONTINUED | OUTPATIENT
Start: 2017-08-08 | End: 2017-08-11 | Stop reason: CLARIF

## 2017-08-08 RX ORDER — HYDROMORPHONE HYDROCHLORIDE 1 MG/ML
.3-.5 INJECTION, SOLUTION INTRAMUSCULAR; INTRAVENOUS; SUBCUTANEOUS EVERY 5 MIN PRN
Status: DISCONTINUED | OUTPATIENT
Start: 2017-08-08 | End: 2017-08-11 | Stop reason: CLARIF

## 2017-08-08 RX ORDER — CITRIC ACID/SODIUM CITRATE 334-500MG
30 SOLUTION, ORAL ORAL
Status: DISCONTINUED | OUTPATIENT
Start: 2017-08-08 | End: 2017-08-08 | Stop reason: HOSPADM

## 2017-08-08 RX ORDER — OXYTOCIN/0.9 % SODIUM CHLORIDE 30/500 ML
100 PLASTIC BAG, INJECTION (ML) INTRAVENOUS CONTINUOUS
Status: DISCONTINUED | OUTPATIENT
Start: 2017-08-08 | End: 2017-08-11 | Stop reason: HOSPADM

## 2017-08-08 RX ORDER — OXYTOCIN/0.9 % SODIUM CHLORIDE 30/500 ML
PLASTIC BAG, INJECTION (ML) INTRAVENOUS PRN
Status: DISCONTINUED | OUTPATIENT
Start: 2017-08-08 | End: 2017-08-08

## 2017-08-08 RX ORDER — EPHEDRINE SULFATE 50 MG/ML
INJECTION, SOLUTION INTRAVENOUS PRN
Status: DISCONTINUED | OUTPATIENT
Start: 2017-08-08 | End: 2017-08-08

## 2017-08-08 RX ORDER — OXYTOCIN 10 [USP'U]/ML
10 INJECTION, SOLUTION INTRAMUSCULAR; INTRAVENOUS
Status: DISCONTINUED | OUTPATIENT
Start: 2017-08-08 | End: 2017-08-11 | Stop reason: HOSPADM

## 2017-08-08 RX ORDER — SIMETHICONE 80 MG
80 TABLET,CHEWABLE ORAL 4 TIMES DAILY PRN
Status: DISCONTINUED | OUTPATIENT
Start: 2017-08-08 | End: 2017-08-11 | Stop reason: HOSPADM

## 2017-08-08 RX ORDER — SODIUM CHLORIDE, SODIUM LACTATE, POTASSIUM CHLORIDE, CALCIUM CHLORIDE 600; 310; 30; 20 MG/100ML; MG/100ML; MG/100ML; MG/100ML
INJECTION, SOLUTION INTRAVENOUS CONTINUOUS
Status: DISCONTINUED | OUTPATIENT
Start: 2017-08-08 | End: 2017-08-08 | Stop reason: HOSPADM

## 2017-08-08 RX ORDER — LIDOCAINE 40 MG/G
CREAM TOPICAL
Status: DISCONTINUED | OUTPATIENT
Start: 2017-08-08 | End: 2017-08-11 | Stop reason: CLARIF

## 2017-08-08 RX ADMIN — SODIUM CHLORIDE, POTASSIUM CHLORIDE, SODIUM LACTATE AND CALCIUM CHLORIDE: 600; 310; 30; 20 INJECTION, SOLUTION INTRAVENOUS at 18:03

## 2017-08-08 RX ADMIN — SCOPOLAMINE 1 PATCH: 1 PATCH, EXTENDED RELEASE TRANSDERMAL at 21:17

## 2017-08-08 RX ADMIN — FENTANYL CITRATE 100 MCG: 50 INJECTION INTRAMUSCULAR; INTRAVENOUS at 16:56

## 2017-08-08 RX ADMIN — SODIUM CITRATE AND CITRIC ACID MONOHYDRATE 30 ML: 500; 334 SOLUTION ORAL at 17:28

## 2017-08-08 RX ADMIN — EPHEDRINE SULFATE 25 MG: 50 INJECTION INTRAMUSCULAR; INTRAVENOUS; SUBCUTANEOUS at 20:50

## 2017-08-08 RX ADMIN — BUPIVACAINE HYDROCHLORIDE IN DEXTROSE 10.5 MG: 7.5 INJECTION, SOLUTION SUBARACHNOID at 18:12

## 2017-08-08 RX ADMIN — SODIUM CHLORIDE, POTASSIUM CHLORIDE, SODIUM LACTATE AND CALCIUM CHLORIDE 1000 ML: 600; 310; 30; 20 INJECTION, SOLUTION INTRAVENOUS at 16:34

## 2017-08-08 RX ADMIN — Medication 100 ML/HR: at 19:57

## 2017-08-08 RX ADMIN — ONDANSETRON 4 MG: 2 INJECTION INTRAMUSCULAR; INTRAVENOUS at 18:07

## 2017-08-08 RX ADMIN — KETOROLAC TROMETHAMINE 30 MG: 30 INJECTION, SOLUTION INTRAMUSCULAR at 20:17

## 2017-08-08 RX ADMIN — PROMETHAZINE HYDROCHLORIDE 25 MG: 25 INJECTION INTRAMUSCULAR; INTRAVENOUS at 20:44

## 2017-08-08 RX ADMIN — MORPHINE SULFATE 0.3 MG: 1 INJECTION EPIDURAL; INTRATHECAL; INTRAVENOUS at 18:12

## 2017-08-08 RX ADMIN — OXYTOCIN-SODIUM CHLORIDE 0.9% IV SOLN 30 UNIT/500ML 500 ML: 30-0.9/5 SOLUTION at 18:31

## 2017-08-08 RX ADMIN — ACETAMINOPHEN 975 MG: 325 TABLET, FILM COATED ORAL at 23:49

## 2017-08-08 RX ADMIN — METOCLOPRAMIDE 10 MG: 5 INJECTION, SOLUTION INTRAMUSCULAR; INTRAVENOUS at 20:02

## 2017-08-08 RX ADMIN — ONDANSETRON 4 MG: 2 INJECTION INTRAMUSCULAR; INTRAVENOUS at 16:32

## 2017-08-08 RX ADMIN — EPHEDRINE SULFATE 5 MG: 50 INJECTION, SOLUTION INTRAVENOUS at 18:18

## 2017-08-08 RX ADMIN — OXYTOCIN-SODIUM CHLORIDE 0.9% IV SOLN 30 UNIT/500ML 100 ML/HR: 30-0.9/5 SOLUTION at 19:57

## 2017-08-08 RX ADMIN — EPHEDRINE SULFATE 10 MG: 50 INJECTION, SOLUTION INTRAVENOUS at 18:17

## 2017-08-08 RX ADMIN — CEFAZOLIN SODIUM 2 G: 2 INJECTION, SOLUTION INTRAVENOUS at 18:03

## 2017-08-08 RX ADMIN — SODIUM CHLORIDE, POTASSIUM CHLORIDE, SODIUM LACTATE AND CALCIUM CHLORIDE: 600; 310; 30; 20 INJECTION, SOLUTION INTRAVENOUS at 18:52

## 2017-08-08 ASSESSMENT — ENCOUNTER SYMPTOMS: DYSRHYTHMIAS: 0

## 2017-08-08 NOTE — TELEPHONE ENCOUNTER
Pt is currently 39w5d  Scheduled for csec tomorrow for elective. States she just had a large gush of fluid. Fluid clear. No contractions at this time. No bleeding. Advised to go to L&D. Dr. Maria and L&D updated.

## 2017-08-08 NOTE — PLAN OF CARE
Data: Patient presented to [facility] at 1515.  Reason for maternal/fetal assessment per patient is SROM. Patient reports laying on the couch and felt a gush a fluid around 1400 and has felt a trickle since then.  Patient is a .  Prenatal record reviewed. Pregnancy has been uncomplicated thus far.  Gestational Age 39w5d. VSS. Fetal movement present. Patient denies pelvic pressure, UTI symptoms, GI problems, bloody show, vaginal bleeding, edema, visual disturbances, epigastric or URQ pain, abdominal pain, rupture of membranes. Support person,  Faisal, is present. Patient is an elective  section due to increased anxiety surrounding her delivery. Patient last ate a granola bar around 1100 today. Also states that she has had a headache for the last week. She has discussed this with her primary MD who prescribed her reglan and that doesn't appear to be helping much.   Action: Verbal consent for EFM. Triage assessment completed. Bill of rights reviewed.    Response: Patient verbalized agreement with plan. Will contact Dr Maria.

## 2017-08-08 NOTE — PROVIDER NOTIFICATION
17 1621   Provider Notification   Provider Name/Title Dr Maria   Method of Notification Phone   Notification Reason Lab/Diagnostic Study;Labor Status   MD updated of positive amnisure, pt continues to elect to primary  section. Will call [facility] to set a time.

## 2017-08-08 NOTE — IP AVS SNAPSHOT
MRN:[id number]                      After Visit Summary   8/8/2017    Gay Cummins    MRN: [id number]           Thank you!     Thank you for choosing [facility] for your care. Our goal is always to provide you with excellent care. Hearing back from our patients is one way we can continue to improve our services. Please take a few minutes to complete the written survey that you may receive in the mail after you visit. If you would like to speak to someone directly about your visit please contact Patient Relations at [phone]. Thank you!          Patient Information     Date Of Birth          1985        Designated Caregiver       Most Recent Value    Caregiver    Will someone help with your care after discharge? no      About your hospital stay     You were admitted on:  August 8, 2017 You last received care in the:  [facility]    You were discharged on:  August 11, 2017       Who to Call     For medical emergencies, please call 911.  For non-urgent questions about your medical care, please call your primary care provider or clinic, [phone]  For questions related to your surgery, please call your surgery clinic        Attending Provider     Provider Specialty    Chela Maria,  OB/Gyn       Primary Care Provider Office Phone # Fax #    Brittaney Mata [phone]      Your next 10 appointments already scheduled     Aug 24, 2017 11:00 AM CDT   [facility] with Kayla Carlton MD   [facility]    [address] [place]  [facility] [address]   [phone]            Sep 14, 2017  1:00 PM CDT   Post Partum with Kayla Carlton MD   [facility] ([facility])    [address] [place]  [facility] [address]   [phone]            Sep 19, 2017  2:00 PM CDT   Post Partum with Sally Garcia MD   [facility] ([facility])     [address]  [facility] [id number] [phone]              Further instructions from your care team       Postop  Birth Instructions  Lactation: [phone]    Activity       Do not lift more than 10 pounds for 6 weeks after surgery.  Ask family and friends for help when you need it.    No driving until you have stopped taking your pain medications (usually two weeks after surgery).    No heavy exercise or activity for 6 weeks.  Don't do anything that will put a strain on your surgery site.    Don't strain when using the toilet.  Your care team may prescribe a stool softener if you have problems with your bowel movements.     To care for your incision:       Keep the incision clean and dry.    Do not soak your incision in water. No swimming or hot tubs until it has fully healed. You may soak in the bathtub if the water level is below your incision.    Do not use peroxide, gel, cream, lotion, or ointment on your incision.    Adjust your clothes to avoid pressure on your surgery site (check the elastic in your underwear for example).     You may see a small amount of clear or pink drainage and this is normal.  Check with your health care provider:       If the drainage increases or has an odor.    If the incision reddens, you have swelling, or develop a rash.    If you have increased pain and the medicine we prescribed doesn't help.    If you have a fever above 100.4 F (38 C) with or without chills when placing thermometer under your tongue.   The area around your incision (surgery wound), will feel numb.  This is normal. The numbness should go away in less than a year.     Keep your hands clean:  Always wash your hands before touching your incision (surgery wound). This helps reduce your risk of infection. If your hands aren't dirty, you may use an alcohol hand-rub to clean your hands. Keep your nails clean and short.    Call your healthcare provider if you have any of these symptoms:        You soak a sanitary pad with blood within 1 hour, or you see blood clots larger than a golf ball.    Bleeding that lasts more than 6 weeks.    Vaginal discharge that smells bad.    Severe pain, cramping or tenderness in your lower belly area.    A need to urinate more frequently (use the toilet more often), more urgently (use the toilet very quickly), or it burns when you urinate.    Nausea and vomiting.    Redness, swelling or pain around a vein in your leg.    Problems breastfeeding or a red or painful area on your breast.    Chest pain and cough or are gasping for air.    Problems with coping with sadness, anxiety or depression. If you have concerns about hurting yourself or the baby, call your provider immediately.      You have questions or concerns after you return home.                  Note from your Doctor  Postpartum pain control:  It is normal to have abdominal pain following surgery. The goal is to get your pain level to a 3/10, enabling you to walk around comfortably. You may experience cramping for upto 1-2 weeks, particularly while breast feeding.     -Start your day by taking up to 3 tabs of regular strength ibuprofen (600mg), continue every 6 hours as needed for pain.    -You can additionally take 1-2 tabs of tylenol (325-650mg regular strength 500-1000mg extra strength), every 6 hours for additional pain control as needed. Take no more than 4g of tylenol daily.     - Narcotic pain medication is available for breakthrough pain, you may find that you need it every 4-6 hours or just at night. You can take 1-2 tabs of oxycodone, roxicodone, or norco as needed. If you are taking a formulation that contains tylenol (oxycodone, norco) do NOT take additional tylenol.     It is best to alternate your medications so you are taking something every 3 hours if you are having continued pain.      Warning signs:  You can expect some continued spotting or bleeding for the next 4 weeks or so, if you develop  significantly higher flow more than 1 pad/hour please call the clinic right away. If you develop fever to 100.4 or greater, increasing pelvic/abdominal pain, or foul smelling discharge please contact the clinic. Increased volume of discharge is normal.    Limitations:  No lifting > 15 b for 6 weeks  No driving for 2 weeks or while using narcotic pain medications  Nothing per vagina (no intercourse or tampons) for 6 weeks     Constipation  You may use the following products to ease constipation:    1. Stool softeners such as metamucil or benefiber  2. senna 1-2 times daily  3. Fiber supplements  4. miralax (over the counter).  5. Dulcolax    Please be sure to keep adequately hydrated; 6-8 8oz glasses daily, more if needed to compensate for exercise, sweating, etc.      More specific dosing can be found below:    - Metamucil 28g daily PO with 8oz of water  - senna-docusate 8.6-50 MG per tablet PO 1 tablet, Oral, 2 TIMES DAILY, Start with 1 tablet PO BID, reduce to 1 tablet daily when having daily BMs. Stop for loose stools.  - docusate sodium (COLACE) capsule 100 mg, Oral, 2 TIMES DAILY, To prevent constipation. Hold for loose stools.  - bisacodyl (DULCOLAX) suppository 10 mg, Rectal, DAILY PRN, constipation, Hold for loose stools.       Please contact the clinic with any questions.  Please schedule a follow up appointment with your primary OB/Gyn provider in 6 weeks and sooner if you have any problems.   You can contact the clinic via [employer] or call [place] at [phone] or [place] at [phone].         Pending Results     No orders found from 8/6/2017 to 8/9/2017.            Statement of Approval     Ordered          08/11/17 0828  I have reviewed and agree with all the recommendations and orders detailed in this document.  EFFECTIVE NOW     Approved and electronically signed by:  Sally Garcia MD             Admission Information     Date & Time Provider Department Dept. Phone    8/8/2017  "Candace Chela Doss, DO [facility] [phone]      Your Vitals Were     Blood Pressure Pulse Temperature Respirations Height Weight    121/82 69 98.4  F (36.9  C) (Oral) 18 1.702 m (5' 7\") 88.9 kg (196 lb)    Last Period Pulse Oximetry BMI (Body Mass Index)             11/03/2016 (Exact Date) 98% 30.7 kg/m2         MyChart Information     "[employer] gives you secure access to your electronic health record. If you see a primary care provider, you can also send messages to your care team and make appointments. If you have questions, please call your primary care clinic.  If you do not have a primary care provider, please call [phone] and they will assist you.        Care EveryWhere ID     This is your Care EveryWhere ID. This could be used by other organizations to access your [facility] medical records  [id number]        Equal Access to Services     ALAN MCHUGH : Manuela Smith, tania bledsoe, andressa carvalho, charu rodriguez. So [facility] [phone].    ATENCIÓN: Si habla español, tiene a salazar disposición servicios gratuitos de asistencia lingüística. Llosmar al [phone].    We comply with applicable federal civil rights laws and [place] laws. We do not discriminate on the basis of race, color, national origin, age, disability sex, sexual orientation or gender identity.               Review of your medicines      START taking        Dose / Directions    acetaminophen 325 MG tablet   Commonly known as:  TYLENOL        Dose:  650 mg   Take 2 tablets (650 mg) by mouth every 4 hours as needed for other (surgical pain)   Quantity:  100 tablet   Refills:  0       ibuprofen 200 MG tablet   Commonly known as:  ADVIL/MOTRIN        Dose:  600 mg   Take 3 tablets (600 mg) by mouth every 6 hours as needed for other (cramping)   Refills:  0       oxyCODONE 5 MG IR tablet   Commonly known as:  ROXICODONE        Dose:  5-10 mg   Take 1-2 tablets (5-10 mg) by " mouth every 3 hours as needed for moderate to severe pain   Quantity:  30 tablet   Refills:  0       senna-docusate 8.6-50 MG per tablet   Commonly known as:  SENOKOT-S;PERICOLACE        Dose:  1-2 tablet   Take 1-2 tablets by mouth 2 times daily   Quantity:  100 tablet   Refills:  0         CONTINUE these medicines which have NOT CHANGED        Dose / Directions    breast pump Misc   Used for:  Supervision of normal first pregnancy, antepartum        Dose:  1 each   1 each as needed   Quantity:  1 each   Refills:  0       FISH OIL OMEGA-3 1000 MG Caps        Dose:  1 capsule   Take 1 capsule by mouth daily   Refills:  0       metoclopramide 5 MG tablet   Commonly known as:  REGLAN   Used for:  Headache in pregnancy, third trimester        Dose:  5 mg   Take 1 tablet (5 mg) by mouth 4 times daily as needed For headaches   Quantity:  15 tablet   Refills:  1       PRENATAL VIT-FE BISG-FA-DHA PO        Dose:  1 tablet   Take 1 tablet by mouth daily   Refills:  0            Where to get your medicines      Some of these will need a paper prescription and others can be bought over the counter. Ask your nurse if you have questions.     Bring a paper prescription for each of these medications     oxyCODONE 5 MG IR tablet       You don't need a prescription for these medications     acetaminophen 325 MG tablet    ibuprofen 200 MG tablet    senna-docusate 8.6-50 MG per tablet                Protect others around you: Learn how to safely use, store and throw away your medicines at www.disposemymeds.org.             Medication List: This is a list of all your medications and when to take them. Check marks below indicate your daily home schedule. Keep this list as a reference.      Medications           Morning Afternoon Evening Bedtime As Needed    acetaminophen 325 MG tablet   Commonly known as:  TYLENOL   Take 2 tablets (650 mg) by mouth every 4 hours as needed for other (surgical pain)   Last time this was given:  975 mg on  8/11/2017 12:54 AM                                   breast pump Misc   1 each as needed                                FISH OIL OMEGA-3 1000 MG Caps   Take 1 capsule by mouth daily                                ibuprofen 200 MG tablet   Commonly known as:  ADVIL/MOTRIN   Take 3 tablets (600 mg) by mouth every 6 hours as needed for other (cramping)   Last time this was given:  800 mg on 8/11/2017  6:42 AM                                   metoclopramide 5 MG tablet   Commonly known as:  REGLAN   Take 1 tablet (5 mg) by mouth 4 times daily as needed For headaches                                oxyCODONE 5 MG IR tablet   Commonly known as:  ROXICODONE   Take 1-2 tablets (5-10 mg) by mouth every 3 hours as needed for moderate to severe pain   Last time this was given:  5 mg on 8/10/2017  8:23 AM                                   PRENATAL VIT-FE BISG-FA-DHA PO   Take 1 tablet by mouth daily                                   senna-docusate 8.6-50 MG per tablet   Commonly known as:  SENOKOT-S;PERICOLACE   Take 1-2 tablets by mouth 2 times daily   Last time this was given:  2 tablets on 8/10/2017  9:15 PM

## 2017-08-08 NOTE — ANESTHESIA PROCEDURE NOTES
Peripheral nerve/Neuraxial procedure note : intrathecal  Pre-Procedure  Performed by REX MALDONADO  Location: OR      Pre-Anesthestic Checklist: patient identified, IV checked, risks and benefits discussed, informed consent, monitors and equipment checked and pre-op evaluation    Timeout  Correct Patient: Yes   Correct Procedure: Yes   Correct Site: Yes   Correct Laterality: N/A   Correct Position: Yes   Site Marked: No   .   Procedure Documentation  ASA 2  .    Procedure:    Intrathecal.  Insertion Site:L2-3  (midline approach)      Patient Prep;mask, sterile gloves, povidone-iodine 7.5% surgical scrub.  .  Needle: Margo tip Spinal Needle (gauge): 22  Spinal/LP Needle Length (inches): 3.5 # of attempts: 2 (24 ga Sprotte needle bent in ligaments) and # of redirects:  No introducer used .       Assessment/Narrative  Paresthesias: No.  .  .  . Time Injected: 18:12  Comments:  [id number], lot [id number], exp. 2018-09-01    10.5 mg bupivicaine and 0.3 mg morphine placed.

## 2017-08-08 NOTE — ANESTHESIA PREPROCEDURE EVALUATION
PAC NOTE:       ANESTHESIA PRE EVALUATION:  Anesthesia Evaluation     .             ROS/MED HX    ENT/Pulmonary:      (-) asthma, sleep apnea and Other pulmonary disease   Neurologic:      (-) TIA, Other neuro hx and Delerium   Cardiovascular:        (-) hypertension, CAD, CHF, arrhythmias, pulmonary hypertension and dyslipidemia   METS/Exercise Tolerance:     Hematologic:        (-) anemia   Musculoskeletal:        (-) arthritis   GI/Hepatic:     (+) GERD      (-) liver disease   Renal/Genitourinary:      (-) renal disease   Endo:      (-) Type I DM, Type II DM, thyroid disease, chronic steroid usage and obesity   Psychiatric:        (-) psychiatric history   Infectious Disease:  - neg infectious disease ROS       Malignancy:      - no malignancy   Other:                     Physical Exam      Airway   Mallampati: II  TM distance: >3 FB  Neck ROM: full    Dental     Cardiovascular   Rhythm and rate: regular and normal  (-) no murmur    Pulmonary    breath sounds clear to auscultation    Other findings: Lab Test        08/08/17 05/12/17 03/01/17 12/28/16                       0910          1050          1425          1010          WBC           --          8.9          7.7          9.0           HGB          13.7         11.7         11.8         12.8          MCV           --          98           94           95            PLT           --          220          237          273            No lab results found.         Anesthesia Plan      History & Physical Review  History and physical reviewed and following examination; no interval change.    ASA Status:  2 emergent.    NPO Status:  > 6 hours    Plan for Spinal   PONV prophylaxis:  Ondansetron (or other 5HT-3) and Dexamethasone or Solumedrol       Postoperative Care  Postoperative pain management:  IV analgesics, Oral pain medications and Neuraxial analgesia.      Consents  Anesthetic plan, risks, benefits and alternatives discussed with:  Patient..                             .

## 2017-08-08 NOTE — IP AVS SNAPSHOT
[facility]    [address]    [facility] [address]    Phone:  [phone]    Fax:  [phone]                                       After Visit Summary   8/8/2017    Gay Cummins    MRN: [id number]           After Visit Summary Signature Page     I have received my discharge instructions, and my questions have been answered. I have discussed any challenges I see with this plan with the nurse or doctor.    ..........................................................................................................................................  Patient/Patient Representative Signature      ..........................................................................................................................................  Patient Representative Print Name and Relationship to Patient    ..................................................               ................................................  Date                                            Time    ..........................................................................................................................................  Reviewed by Signature/Title    ...................................................              ..............................................  Date                                                            Time

## 2017-08-09 LAB — HGB BLD-MCNC: 10.9 G/DL (ref 11.7–15.7)

## 2017-08-09 PROCEDURE — 36415 COLL VENOUS BLD VENIPUNCTURE: CPT | Performed by: FAMILY MEDICINE

## 2017-08-09 PROCEDURE — 25000132 ZZH RX MED GY IP 250 OP 250 PS 637: Performed by: FAMILY MEDICINE

## 2017-08-09 PROCEDURE — 25000128 H RX IP 250 OP 636: Performed by: FAMILY MEDICINE

## 2017-08-09 PROCEDURE — 12000029 ZZH R&B OB INTERMEDIATE

## 2017-08-09 PROCEDURE — 85018 HEMOGLOBIN: CPT | Performed by: FAMILY MEDICINE

## 2017-08-09 PROCEDURE — 40000084 ZZH STATISTIC IP LACTATION SERVICES 16-30 MIN

## 2017-08-09 RX ADMIN — IBUPROFEN 800 MG: 400 TABLET ORAL at 21:37

## 2017-08-09 RX ADMIN — KETOROLAC TROMETHAMINE 30 MG: 30 INJECTION, SOLUTION INTRAMUSCULAR at 02:22

## 2017-08-09 RX ADMIN — SENNOSIDES AND DOCUSATE SODIUM 1 TABLET: 8.6; 5 TABLET ORAL at 08:58

## 2017-08-09 RX ADMIN — ACETAMINOPHEN 975 MG: 325 TABLET, FILM COATED ORAL at 08:57

## 2017-08-09 RX ADMIN — SENNOSIDES AND DOCUSATE SODIUM 2 TABLET: 8.6; 5 TABLET ORAL at 19:34

## 2017-08-09 RX ADMIN — SIMETHICONE CHEW TAB 80 MG 80 MG: 80 TABLET ORAL at 19:34

## 2017-08-09 RX ADMIN — ACETAMINOPHEN 975 MG: 325 TABLET, FILM COATED ORAL at 16:57

## 2017-08-09 RX ADMIN — KETOROLAC TROMETHAMINE 30 MG: 30 INJECTION, SOLUTION INTRAMUSCULAR at 15:19

## 2017-08-09 RX ADMIN — SODIUM CHLORIDE, SODIUM LACTATE, POTASSIUM CHLORIDE, CALCIUM CHLORIDE AND DEXTROSE MONOHYDRATE: 5; 600; 310; 30; 20 INJECTION, SOLUTION INTRAVENOUS at 00:54

## 2017-08-09 RX ADMIN — KETOROLAC TROMETHAMINE 30 MG: 30 INJECTION, SOLUTION INTRAMUSCULAR at 08:57

## 2017-08-09 NOTE — L&D DELIVERY NOTE
"Delivery Summary    Gay Cummins MRN# [id number]   Age: 32 year old YOB: 1985     ASSESSMENT & PLAN:   32 year old y/o  @ 39w5d with Estimated Date of Delivery: Data Unavailable     Patient was admitted for srom with planned primary  section  Fetal weight 7# 4.8 oz   Complications none no  EBL:  500 cc               Labor Length    3rd Stage (hrs):  0 (min):  4      Labor Events     labor?:  No   Labor Type:  Spontaneous, Other   Predominate monitoring during 1st stage:  continuous electronic fetal monitoring      Antibiotics received during labor?:  No      Rupture identifier:  Rupture 1   Rupture date/time: 17 1400   Rupture type:  Spontaneous rupture of membranes prior to C/S   Fluid color:  Clear   Fluid odor:  Normal      1:1 continuous labor support provided by?:  RN          Delivery/Placenta Date and Time    Delivery Date:  17 Delivery Time:   6:31 PM   Placenta Date/Time:  2017  6:35 PM   Oxytocin given at the time of delivery:  after delivery of placenta      Apgars    Living status:  Living    1 Minute 5 Minute 10 Minute 15 Minute 20 Minute   Skin color: 1  1       Heart rate: 2  2       Reflex irritability: 2  2       Muscle tone: 2  2       Respiratory effort: 2  2       Total: 9  9          Apgars assigned by:  DORIAN PATTON RN      Cord    Vessels:  3 Vessels Complications:  None   Cord Blood Disposition:  Lab Gases Sent?:  No         [place] Resuscitation    Methods:  None          Measurements    Weight:  116.8 oz Length:  20.5\"   Head circumference:  0.343 m       Skin to Skin and Feeding Plan    Skin to skin initiation date/time: 17 1842   Skin to skin with:  Mother   Skin to skin end date/time:     How do you plan to feed your baby:  Breastfeeding      Labor Events and Shoulder Dystocia    Fetal Tracing Prior to Delivery:  Category 1   Shoulder dystocia present?:  Neg            Delivery (Maternal) (Provider to Complete) ([id number])  "   Episiotomy:  None         Mother's Information  Mother: Gay Cummins #[id number]    Start of Mother's Information     IO Blood Loss  17 1825 - 17 1919    Mom's I/O Activity            End of Mother's Information  Mother: Gay Cummins #[id number]            Delivery - Provider to Complete ([id number])    Delivery Type (Choose the 1 that will go to the Birth History):  , Low Transverse                      Specifics:  Primary nulliparius   Indications for Primary:  Elective   Other personnel:   Provider Role   KIKO WATERS    Delayed Cord Clamping:  Done   Date/Time:  2017  6:35 PM   Removal:  Spontaneous   Disposition:  Hospital disposal      Anesthesia    Method:  Epidural         Presentation and Position    Presentation:  Vertex   Position:  Left Occiput Anterior                    Kiko Waters DO

## 2017-08-09 NOTE — OP NOTE
PREOPERATIVE DIAGNOSIS: A 32-year-old  at 39w5d  weeks estimated gestational age admitted for SROM with maternal fear of obstetric trauma.  POSTOPERATIVE DIAGNOSES: A 32-year-old  at 39w5d  weeks estimated gestational age admitted for SROM with maternal fear of obstetric trauma.  SURGEON: Dr. Chela Maria  PROCEDURE:   1. Primary  low transverse  section.   2. Seprafilm placement for adhesiolysis.   COMPLICATIONS: None apparent at time of procedure.   ESTIMATED BLOOD LOSS:500  mL.   SURGEONS: Chela Maria DO.   INDICATIONS: A 32-year-old  at 39w5d  weeks estimated gestational age admitted for SROM with maternal fear of obstetric trauma.  Patient signed consent for primary  section, risks benefits, alternatives are discussed with the patient.    OUTCOME: female infant 7# 4.8 oz apgars 9 at 1 minute and 9 at 5 minutes.   Findings:   Normal uterus, tubes and ovaries.   DETAILS OF PROCEDURE: After informed consent was signed, the patient was placed in dorsal supine position, spinal placed for anesthesia. Fetal heart tones were obtained and found to be in 150s. The patient was prepped and draped in normal sterile fashion and a time out was performed. Adequate anesthesia was reported per patient after the spinal was dosed to a surgical level.  A pfannensteil incision was then carried down to the underlying fascia and incised in the midline. Blunt/sharp dissection of the peritoneum was performed and blunt stretching of the muscles was perfomed. The uterus was visualized. The peritoneum was visualized, bladder blade was placed, The vesicouterine peritoneum incised and a bladder flap created. The bladder blade was replaced. Lower uterine segment was incised with a scalpel. Blunt stretching was performed and the fetal arm delivered, and was replaced in the uterus atraumatically and then the baby was delivered atraumatically. The cord was clamped and cut. The nose and mouth were bulb suctioned.  Infant was taken over to the waiting  staff. The placenta spontaneously delivered. The uterus was cleared of all clots and debris. The placenta was examined and found to be complete.  The lower uterine segment was reapproximated with 0 Monocryl in a running locked fashion. A second layer of the same suture was used for imbrication. The bladder flap was recreated using 3-0 Monocryl. Irrigation was performed. Seprafilm was placed over the bladder flap. Hemostasis was noted. The peritoneum was closed with 3-0 monocryl. The rectus muscles were reapproximated in the midline and closed with several horizontal mattress sutures of 3-0 monocryl  Hemostasis was assured with Bovie cautery on the rectus muscles, and Seprafilm was placed over it. The fascia was reapproximated with 0 looped PDS in a running locked fashion with good reapproximation. A subcuticular stitch using 4-0 Monocryl was used to reapproximate the skin. Steri-Strips, telfa, and tegaderm was applied. The patient tolerated the procedure well. Sponge, lap and needle counts were correct x2.   KIKO WATERS DO

## 2017-08-09 NOTE — PROGRESS NOTES
[facility] Obstetrics Post-Op / Progress Note         Assessment and Plan:    Assessment:   Post-operative day #1  Low transverse primary  section  L&D complications: elective      Doing well.      Plan:   Ambulation encouraged           Interval History:   Doing well.  Pain is well-controlled.  No fevers.  No history of wound drainage, warmth or significant erythema.  Good appetite.  Denies chest pain, shortness of breath, nausea or vomiting.  Ambulatory.              Significant Problems:    None          Review of Systems:    The patient denies any chest pain, shortness of breath, excessive pain, fever, chills, purulent drainage from the wound, nausea or vomiting.          Medications:   All medications related to the patient's surgery have been reviewed          Physical Exam:   All vitals stable  Wound clean and dry with minimal or no drainage.  Surrounding skin with minimal erythema.          Data:   All laboratory data related to this surgery reviewed  All imaging studies related to this surgery reviewed  Michael Garcia MD

## 2017-08-09 NOTE — BRIEF OP NOTE
[facility] Brief Operative Note    Pre-operative diagnosis: elective   Post-operative diagnosis 31 y/o  @ 39 5/7 weeks EGA with SROM, maternal fear of obstetric trauma   \   Procedure: Procedure(s):  priamry  section  - Wound Class: I-Clean   Surgeon(s): Surgeon(s) and Role:     * Chela Maria DO - Primary   Estimated blood loss: * No values recorded between 2017  6:25 PM and 2017  7:09 PM *    Specimens:   ID Type Source Tests Collected by Time Destination   1 :  Cord blood Blood CORD BLOOD STUDY, OR DOCUMENTATION ONLY Chela Maria DO 2017  6:36 PM       Findings: Normal uterus ovaries and fallopian tubes

## 2017-08-09 NOTE — LACTATION NOTE
LC to see patient and assist with latch.  No tongue tie noted, or very mild.  Baby latches without the use of the nipple shield.  Football hold was used to help control the approach to latch.  Hand expression demonstrated and an abundant amount of milky colostrum noted.  Baby has some attempts with latching but maintains latch well once sucking.  Plan for continued support and follow up tomorrow.

## 2017-08-09 NOTE — PLAN OF CARE
Problem: Goal Outcome Summary  Goal: Goal Outcome Summary  Outcome: Improving  Pt. VSS. Patient requires some assistance in personal and infant cares. Pain managed with tylenol and Toradol. Adequate output via [place]. Ambulated to bathroom, tolerated well. Breastfeeding infant with shield at times. Began pumping and supplementing with EBM. Bonding well with infant. Pt. education done regarding breastpump, see flow sheet.

## 2017-08-09 NOTE — PLAN OF CARE
Data: Gay Cummins transferred to 444 via cart at 2200. Baby transferred via parent's arms.  Action: Receiving unit notified of transfer: Yes. Patient and family notified of room change. Report given to Nisreen RN at 2315. Belongings sent to receiving unit. Accompanied by Registered Nurse. Oriented patient to surroundings. Call light within reach. ID bands double-checked with receiving RN. Mother reporting feeling much better now and starting to try clear liquids and crackers.  Response: Patient tolerated transfer and is stable.

## 2017-08-09 NOTE — ANESTHESIA CARE TRANSFER NOTE
Patient: Gay Cummins    Procedure(s):  priamry  section  - Wound Class: I-Clean    Diagnosis: elective  Diagnosis Additional Information: No value filed.    Anesthesia Type:   Spinal     Note:    Patient transferred to:Labor and Delivery  Comments: Pt tolerated spinal to L&D VSS report to RN      Vitals: (Last set prior to Anesthesia Care Transfer)    CRNA VITALS  2017 1839 - 2017 1914      2017             Pulse: 85    SpO2: 94 %                Electronically Signed By: TED Whalen CRNA  2017  7:14 PM

## 2017-08-09 NOTE — ANESTHESIA POSTPROCEDURE EVALUATION
Patient: Gay Cummins    Procedure(s):  priamry  section  - Wound Class: I-Clean    Diagnosis:elective  Diagnosis Additional Information:  at 39w5d  weeks estimated gestational age admitted for SROM with maternal fear of obstetric trauma    Anesthesia Type:  Spinal    Note:  Anesthesia Post Evaluation    Patient location during evaluation: Bedside  Patient participation: Able to fully participate in evaluation  Level of consciousness: awake and alert  Pain management: adequate  Airway patency: patent  Cardiovascular status: acceptable  Respiratory status: acceptable  Hydration status: acceptable  PONV: controlled     Anesthetic complications: None          Last vitals:  Vitals:    17 1928 17 1950 17   BP: 110/61 130/70 138/76   Resp:    Temp:      SpO2:            Electronically Signed By: Allen Montelongo MD  2017  8:09 PM

## 2017-08-09 NOTE — PLAN OF CARE
Problem: Goal Outcome Summary  Goal: Goal Outcome Summary  Outcome: Improving  Pt vitals stable. Up ad eddie. Incision CDI. Breast feeding. Pain managed with tylenol and toradol. 1 measured void since díaz removal, voided without difficulty. Saline locked. Spouse at bedside and supportive. Continue to monitor.

## 2017-08-10 PROCEDURE — 40000083 ZZH STATISTIC IP LACTATION SERVICES 1-15 MIN

## 2017-08-10 PROCEDURE — 12000029 ZZH R&B OB INTERMEDIATE

## 2017-08-10 PROCEDURE — 25000132 ZZH RX MED GY IP 250 OP 250 PS 637: Performed by: FAMILY MEDICINE

## 2017-08-10 RX ADMIN — SENNOSIDES AND DOCUSATE SODIUM 1 TABLET: 8.6; 5 TABLET ORAL at 08:19

## 2017-08-10 RX ADMIN — SENNOSIDES AND DOCUSATE SODIUM 2 TABLET: 8.6; 5 TABLET ORAL at 21:15

## 2017-08-10 RX ADMIN — ACETAMINOPHEN 975 MG: 325 TABLET, FILM COATED ORAL at 08:22

## 2017-08-10 RX ADMIN — IBUPROFEN 800 MG: 400 TABLET ORAL at 12:38

## 2017-08-10 RX ADMIN — IBUPROFEN 800 MG: 400 TABLET ORAL at 19:24

## 2017-08-10 RX ADMIN — IBUPROFEN 800 MG: 400 TABLET ORAL at 03:41

## 2017-08-10 RX ADMIN — OXYCODONE HYDROCHLORIDE 5 MG: 5 TABLET ORAL at 08:23

## 2017-08-10 RX ADMIN — SIMETHICONE CHEW TAB 80 MG 80 MG: 80 TABLET ORAL at 22:34

## 2017-08-10 RX ADMIN — ACETAMINOPHEN 975 MG: 325 TABLET, FILM COATED ORAL at 00:26

## 2017-08-10 RX ADMIN — ACETAMINOPHEN 975 MG: 325 TABLET, FILM COATED ORAL at 16:41

## 2017-08-10 RX ADMIN — OXYCODONE HYDROCHLORIDE 5 MG: 5 TABLET ORAL at 01:12

## 2017-08-10 RX ADMIN — SIMETHICONE CHEW TAB 80 MG 80 MG: 80 TABLET ORAL at 16:46

## 2017-08-10 NOTE — PLAN OF CARE
"Problem: Goal Outcome Summary  Goal: Goal Outcome Summary  Outcome: No Change  Pt meets expectations, incisional pain is managed with pharmacological interventions and rest, breast feeding is \"improved\" per pt, showered, ambulating in room, encouraged pt to ambulate in the hallway, plan of care is updated with pt and spouse.      "

## 2017-08-10 NOTE — LACTATION NOTE
LC to see patient.  She had multiple questions about the normal course of lactation and pumping.  All questions answered.  She feels that latching has improved.  Infant still prefers the left side. LC encouraged her to continue to offer both sides with each feeding.  Second night syndrome and clustering also discussed.  Plan for follow up tomorrow.

## 2017-08-10 NOTE — PROGRESS NOTES
Postpartum Progress Note  Gay Cummins  [id number]    Subjective:   Patiet doing well today. Pain well controlled with tylenol, ibuprofen, and oxycodone. Denies nausea and vomiting. Ambulating without difficulty. Adequate PO intake. Working on breast feeding with scant production.    Objective:  Vitals:    17 1526 17 1657 08/10/17 0106 08/10/17 0831   BP:  109/49 111/64 107/61   Pulse:  81 77 79   Resp: 16 16 16 18   Temp:  98.6  F (37  C) 98.6  F (37  C) 98.2  F (36.8  C)   TempSrc:  Oral Oral Oral   SpO2:       Weight:       Height:          General: resting comfortably, in NAD  Heart: regular rate, well perfused  Lungs: no increased work of breathing, no cough  Abdomen: soft, non distended, appropriately tender, FF at U -2  Incision:  Dressing in place with scant strike through, no surrounding erythema  Extremities: scant b/l edema, non-tender to palpation    I/O last 3 completed shifts:  In: 131 [I.V.:131]  Out: 1500 [Urine:1500]    Labs:  Hemoglobin   Date Value Ref Range Status   2017 10.9 (L) 11.7 - 15.7 g/dL Final   2017 13.7 11.7 - 15.7 g/dL Final       Assessment/Plan:  32 year old  who is POD#2 s/p primary elective c/s for maternal concern for birth trauma.  Currently stable and doing well  - Routine post-partum/op cares. Dressing off in shower today.  - Heme: Hgb 13.7 > > 10.9, asymptomatic   - Rh +, Rubella immune  - Baby Chayito in room with mom, doing well  - Dispo: d/c to home when meeting post-op goals, likely POD#3    Kayla Carlton MD  OB/GYN

## 2017-08-10 NOTE — PLAN OF CARE
Problem: Goal Outcome Summary  Goal: Goal Outcome Summary  Outcome: Improving  Pt. VSS. Patient independent in personal and infant cares. Pain managed with tylenol, ibuprofen, and oxycodone. Breastfeeding infant. Bonding well with infant.

## 2017-08-10 NOTE — PLAN OF CARE
Problem: Goal Outcome Summary  Goal: Goal Outcome Summary  Outcome: Improving  Patient is meeting expectations for this shift. VSS; ambulating in the room and the halls although not passing gas yet. 2 senna were given this evening along with mylicon. Managing pain with scheduled tylenol and the motrin. She is voiding adequately and frequently on her own. Very attentive to  and independent with self and infant cares.  is at the bedside and supportive. Will continue to monitor.

## 2017-08-11 VITALS
WEIGHT: 196 LBS | DIASTOLIC BLOOD PRESSURE: 59 MMHG | TEMPERATURE: 97.5 F | RESPIRATION RATE: 16 BRPM | SYSTOLIC BLOOD PRESSURE: 114 MMHG | OXYGEN SATURATION: 98 % | HEIGHT: 67 IN | HEART RATE: 80 BPM | BODY MASS INDEX: 30.76 KG/M2

## 2017-08-11 PROCEDURE — 90707 MMR VACCINE SC: CPT | Performed by: FAMILY MEDICINE

## 2017-08-11 PROCEDURE — 40000083 ZZH STATISTIC IP LACTATION SERVICES 1-15 MIN

## 2017-08-11 PROCEDURE — 25000128 H RX IP 250 OP 636: Performed by: FAMILY MEDICINE

## 2017-08-11 PROCEDURE — 25000132 ZZH RX MED GY IP 250 OP 250 PS 637: Performed by: FAMILY MEDICINE

## 2017-08-11 RX ORDER — IBUPROFEN 200 MG
600 TABLET ORAL EVERY 6 HOURS PRN
COMMUNITY
Start: 2017-08-11 | End: 2017-09-05

## 2017-08-11 RX ORDER — AMOXICILLIN 250 MG
1-2 CAPSULE ORAL 2 TIMES DAILY
Qty: 100 TABLET | COMMUNITY
Start: 2017-08-11 | End: 2017-10-10

## 2017-08-11 RX ORDER — OXYCODONE HYDROCHLORIDE 5 MG/1
5-10 TABLET ORAL
Qty: 30 TABLET | Refills: 0 | Status: SHIPPED | OUTPATIENT
Start: 2017-08-11 | End: 2017-09-05

## 2017-08-11 RX ORDER — ACETAMINOPHEN 325 MG/1
650 TABLET ORAL EVERY 4 HOURS PRN
Qty: 100 TABLET | COMMUNITY
Start: 2017-08-11 | End: 2017-10-10

## 2017-08-11 RX ADMIN — ACETAMINOPHEN 975 MG: 325 TABLET, FILM COATED ORAL at 09:19

## 2017-08-11 RX ADMIN — IBUPROFEN 800 MG: 400 TABLET ORAL at 00:54

## 2017-08-11 RX ADMIN — SENNOSIDES AND DOCUSATE SODIUM 2 TABLET: 8.6; 5 TABLET ORAL at 09:19

## 2017-08-11 RX ADMIN — MEASLES, MUMPS, AND RUBELLA VIRUS VACCINE LIVE 0.5 ML: 1000; 12500; 1000 INJECTION, POWDER, LYOPHILIZED, FOR SUSPENSION SUBCUTANEOUS at 11:01

## 2017-08-11 RX ADMIN — IBUPROFEN 800 MG: 400 TABLET ORAL at 06:42

## 2017-08-11 RX ADMIN — ACETAMINOPHEN 975 MG: 325 TABLET, FILM COATED ORAL at 00:54

## 2017-08-11 NOTE — PLAN OF CARE
Pt discharging home with baby. Discharge instructions, follow up appointment, home care referral, and discharge medications reviewed; pt verbalized understanding. No further questions at this time. ID bands verified.

## 2017-08-11 NOTE — DISCHARGE INSTRUCTIONS
Postop  Birth Instructions  Lactation: [phone]    Activity       Do not lift more than 10 pounds for 6 weeks after surgery.  Ask family and friends for help when you need it.    No driving until you have stopped taking your pain medications (usually two weeks after surgery).    No heavy exercise or activity for 6 weeks.  Don't do anything that will put a strain on your surgery site.    Don't strain when using the toilet.  Your care team may prescribe a stool softener if you have problems with your bowel movements.     To care for your incision:       Keep the incision clean and dry.    Do not soak your incision in water. No swimming or hot tubs until it has fully healed. You may soak in the bathtub if the water level is below your incision.    Do not use peroxide, gel, cream, lotion, or ointment on your incision.    Adjust your clothes to avoid pressure on your surgery site (check the elastic in your underwear for example).     You may see a small amount of clear or pink drainage and this is normal.  Check with your health care provider:       If the drainage increases or has an odor.    If the incision reddens, you have swelling, or develop a rash.    If you have increased pain and the medicine we prescribed doesn't help.    If you have a fever above 100.4 F (38 C) with or without chills when placing thermometer under your tongue.   The area around your incision (surgery wound), will feel numb.  This is normal. The numbness should go away in less than a year.     Keep your hands clean:  Always wash your hands before touching your incision (surgery wound). This helps reduce your risk of infection. If your hands aren't dirty, you may use an alcohol hand-rub to clean your hands. Keep your nails clean and short.    Call your healthcare provider if you have any of these symptoms:       You soak a sanitary pad with blood within 1 hour, or you see blood clots larger than a golf ball.    Bleeding that lasts  more than 6 weeks.    Vaginal discharge that smells bad.    Severe pain, cramping or tenderness in your lower belly area.    A need to urinate more frequently (use the toilet more often), more urgently (use the toilet very quickly), or it burns when you urinate.    Nausea and vomiting.    Redness, swelling or pain around a vein in your leg.    Problems breastfeeding or a red or painful area on your breast.    Chest pain and cough or are gasping for air.    Problems with coping with sadness, anxiety or depression. If you have concerns about hurting yourself or the baby, call your provider immediately.      You have questions or concerns after you return home.                  Note from your Doctor  Postpartum pain control:  It is normal to have abdominal pain following surgery. The goal is to get your pain level to a 3/10, enabling you to walk around comfortably. You may experience cramping for upto 1-2 weeks, particularly while breast feeding.     -Start your day by taking up to 3 tabs of regular strength ibuprofen (600mg), continue every 6 hours as needed for pain.    -You can additionally take 1-2 tabs of tylenol (325-650mg regular strength 500-1000mg extra strength), every 6 hours for additional pain control as needed. Take no more than 4g of tylenol daily.     - Narcotic pain medication is available for breakthrough pain, you may find that you need it every 4-6 hours or just at night. You can take 1-2 tabs of oxycodone, roxicodone, or norco as needed. If you are taking a formulation that contains tylenol (oxycodone, norco) do NOT take additional tylenol.     It is best to alternate your medications so you are taking something every 3 hours if you are having continued pain.      Warning signs:  You can expect some continued spotting or bleeding for the next 4 weeks or so, if you develop significantly higher flow more than 1 pad/hour please call the clinic right away. If you develop fever to 100.4 or greater,  increasing pelvic/abdominal pain, or foul smelling discharge please contact the clinic. Increased volume of discharge is normal.    Limitations:  No lifting > 15 b for 6 weeks  No driving for 2 weeks or while using narcotic pain medications  Nothing per vagina (no intercourse or tampons) for 6 weeks     Constipation  You may use the following products to ease constipation:    1. Stool softeners such as metamucil or benefiber  2. senna 1-2 times daily  3. Fiber supplements  4. miralax (over the counter).  5. Dulcolax    Please be sure to keep adequately hydrated; 6-8 8oz glasses daily, more if needed to compensate for exercise, sweating, etc.      More specific dosing can be found below:    - Metamucil 28g daily PO with 8oz of water  - senna-docusate 8.6-50 MG per tablet PO 1 tablet, Oral, 2 TIMES DAILY, Start with 1 tablet PO BID, reduce to 1 tablet daily when having daily BMs. Stop for loose stools.  - docusate sodium (COLACE) capsule 100 mg, Oral, 2 TIMES DAILY, To prevent constipation. Hold for loose stools.  - bisacodyl (DULCOLAX) suppository 10 mg, Rectal, DAILY PRN, constipation, Hold for loose stools.       Please contact the clinic with any questions.  Please schedule a follow up appointment with your primary OB/Gyn provider in 6 weeks and sooner if you have any problems.   You can contact the clinic via [email or web address] or call [place] at [phone] or [place] at [phone].

## 2017-08-11 NOTE — LACTATION NOTE
Lactation visit. Breasts are filling today and baby is NW with the nipple shield. She is reports her breasts are filling. Reviewed nipple shield use and care and best time and way to wean from the shield.  Encouraged Mom to call us PRN and plan phone follow up within one week after discharge since going home on a shield.

## 2017-08-11 NOTE — DISCHARGE SUMMARY
[facility]    Discharge Summary  Obstetrics    Date of Admission:  2017  Date of Discharge:  2017  Discharging Provider: Sally Garcia    Discharge Diagnoses   Status post primary      Procedure/Surgery Information   Procedure: Procedure(s):  priamry  section  - Wound Class: II-Clean Contaminated   Surgeon(s): Surgeon(s) and Role:     * Chela Maria,  - Primary     History of Present Illness   Gay Cummins is a 32 year old female who presented with labor desiring primary .    Hospital Course   The patient's hospital course was unremarkable.  She recovered as anticipated and experienced no post-operative complications.  On discharge, her pain was well controlled. Vaginal bleeding is similar to peak menstrual flow.  Voiding without difficulty.  Ambulating well and tolerating a normal diet.  No fever or significant wound drainage.  Breastfeeding well.  Infant is stable.  She was discharged on post-partum day #3.    Post-partum hemoglobin:   Hemoglobin   Date Value Ref Range Status   2017 10.9 (L) 11.7 - 15.7 g/dL Final       Sally Garcia    Discharge Disposition   Discharged to home   Condition at discharge: Stable    Pending Results   Final pathology results: No pathology submitted  These results will be followed up by n/a  Unresulted Labs Ordered in the Past 30 Days of this Admission     No orders found from 2017 to 2017.          Primary Care Physician   Brittaney Mata    Consultations This Hospital Stay   HOME CARE POST PARTUM/ IP CONSULT  LACTATION IP CONSULT    Discharge Orders   No discharge procedures on file.  Discharge Medications   Current Discharge Medication List      START taking these medications    Details   acetaminophen (TYLENOL) 325 MG tablet Take 2 tablets (650 mg) by mouth every 4 hours as needed for other (surgical pain)  Qty: 100 tablet    Associated Diagnoses: S/P  section      ibuprofen  (ADVIL/MOTRIN) 200 MG tablet Take 3 tablets (600 mg) by mouth every 6 hours as needed for other (cramping)    Associated Diagnoses: S/P  section      oxyCODONE (ROXICODONE) 5 MG IR tablet Take 1-2 tablets (5-10 mg) by mouth every 3 hours as needed for moderate to severe pain  Qty: 30 tablet, Refills: 0    Associated Diagnoses: S/P  section      senna-docusate (SENOKOT-S;PERICOLACE) 8.6-50 MG per tablet Take 1-2 tablets by mouth 2 times daily  Qty: 100 tablet    Associated Diagnoses: S/P  section         CONTINUE these medications which have NOT CHANGED    Details   metoclopramide (REGLAN) 5 MG tablet Take 1 tablet (5 mg) by mouth 4 times daily as needed For headaches  Qty: 15 tablet, Refills: 1    Associated Diagnoses: Headache in pregnancy, third trimester      Omega-3 Fatty Acids (FISH OIL OMEGA-3) 1000 MG CAPS Take 1 capsule by mouth daily       PRENATAL VIT-FE BISG-FA-DHA PO Take 1 tablet by mouth daily       Misc. Devices (BREAST PUMP) MISC 1 each as needed  Qty: 1 each, Refills: 0    Associated Diagnoses: Supervision of normal first pregnancy, antepartum           Allergies   No Known Allergies

## 2017-08-11 NOTE — PLAN OF CARE
Problem: Goal Outcome Summary  Goal: Goal Outcome Summary  Outcome: Adequate for Discharge Date Met:  08/11/17  Data: Vital signs within normal limits. Postpartum checks within normal limits - see flow record. Patient eating and drinking normally. Patient able to empty bladder independently and is up ambulating. No apparent signs of infection. Incision healing well. Patient performing self cares and is able to care for infant.  Action: Patient medicated during the shift for pain. See MAR. Patient reassessed within 1 hour after each medication and pain was improved - patient stated she was comfortable. Patient education done about discharge. See flow record.  Response: Positive attachment behaviors observed with infant. Support persons Faisal present.   Plan: Home health order faxed. Discharged to home, accompanied off unit at 1110.

## 2017-08-11 NOTE — PLAN OF CARE
Problem: Goal Outcome Summary  Goal: Goal Outcome Summary  Outcome: Improving  Pt. VSS. Patient independent in personal and infant cares. Pain managed with tylenol and ibuprofen, also using motherlove and hydrogel. Breastfeeding infant with shield. Bonding well with infant. Anticipate discharge to home today, 8/11/17

## 2017-08-11 NOTE — PLAN OF CARE
Problem: Goal Outcome Summary  Goal: Goal Outcome Summary  Outcome: Improving  Pt vitals stable. Up ad eddie and independent with all self and  cares. Breast feeding. Tylenol and ibuprofen for pain. Tender nipples - hydrogel pads given and mother love cream provided. C/o bloating, BSx4, passing gas, simethicone provided. Incision WDL. Continue to monitor.

## 2017-08-15 ENCOUNTER — HOSPITAL ENCOUNTER (OUTPATIENT)
Dept: OBGYN | Facility: CLINIC | Age: 32
Discharge: HOME | End: 2017-08-15
Attending: OBSTETRICS & GYNECOLOGY | Admitting: OBSTETRICS & GYNECOLOGY
Payer: COMMERCIAL

## 2017-08-15 PROCEDURE — 99215 OFFICE O/P EST HI 40 MIN: CPT

## 2017-08-15 NOTE — CONSULTS
LACTATION CONSULT/PHYSICIAN REPORT    MOTHER    Doctor: Dr. Carlton     MOTHER'S CONCERNS    Medical History  none    Pregnancy History       Breastfeeding History  N/A    Delivery History  Primary  for  elective    Labor Meds/Anesthesia  Spinal    Current Medications  Prenatal Vitamins: Yes  Stool softeners and Ibuprofen and tylenol    Herbals:  None    ASSESSMENT OF MOTHER    Physical:   WNL pain to incision and also nipples bilaterally.  Right worse than left.    Milk Supply: WNL for age, may be overproducing    PUMPING: Pump in Style, not pumping currently      HOME CARE VISIT:  none      BABY       Name: Chayito  Birth Date: [birth year] Age:7d   Gestational Age: 39 6/7    Doctor: Dr. Costello at [facility]     Complications of Birth: None    Breastfeeding/hospital: Nipple Shield, pain noted      ASSESSMENT OF BABY    Physical:   WNL     CURRENT BREASTFEEDING ISSUES:   A lot of pain and poor latches.      SUPPLEMENTATION: none    OUTPUT:   WNL some loose watery stools, but yellow in color      BABY'S WEIGHT HISTORY    At Delivery:  Date:   Weight: 7-5    At Discharge:  Date:   Weight: 6-13      Age: 6d  Date:   Weight: 7-5.5    Age: 7d  Date: 8/15  Weight: 7-9.5    Since discharge from hospital, baby has a gain of 8 oz.in 6 days.  Note: Normal weight gain is 1/2 to 1 oz/day in the first 6 months of life.    FEEDING ASSESSMENT  BEFORE INTERVENTION: Pain Levels: L: 6  R: 8  AFTER INTERVENTION: Pain Levels: L: 0  R: 0 initial latch 4/10, then down to zero  INTERVENTIONS/EVALUATION:  Cross Cradle, Asymmetric Latch, Flange lips, Breast Compression and Shield    WEIGHT GAIN AT BREAST: (NOTE: 30cc = 1 oz):  At current weight, baby needs approximately 20.2 oz in 24 hours or 2.5 oz every 3 hours   ( 75 mL)    Baby took in large amounts of breastmilk, exceeding required volumes based on gulping noted and stools.  No pre-weight obtained, but took 1/2 ounce in her sleep after she was finished  nursing.  SUMMARY  Sore nipples/poor latch, resolved with repositioning and nipple shield.      RECOMMENDATIONS  Reverse pressure softening prior to latch to help with softening the areola for latch and nipple shield application.  May consider pumping to relieve pressure when she nurses one side.  Do not empty breast as this will increase production.  Overall interpretation is that she takes in a large volume of breastmilk quickly and may need pacing to burp.  Base of the nipples were cracked due to inappropriate shield size.  Larger, 24 mm shield given.  Hydrogel also given to help with healing.  No open sores or cracking noted.      Follow up: Patient to call lactation consultant if questions arise    Next visit/Phone call: Doctor: next week      Visit Start time: 11:15    End time: 12:23    Lactation Consultant: Morena Trinidad RN  Date: 8/15/2017    .    [facility]  Phone: [phone]     Fax: [phone]

## 2017-09-05 ENCOUNTER — OFFICE VISIT (OUTPATIENT)
Dept: OBGYN | Facility: CLINIC | Age: 32
End: 2017-09-05
Payer: COMMERCIAL

## 2017-09-05 VITALS — WEIGHT: 177.7 LBS | SYSTOLIC BLOOD PRESSURE: 116 MMHG | BODY MASS INDEX: 27.83 KG/M2 | DIASTOLIC BLOOD PRESSURE: 70 MMHG

## 2017-09-05 PROCEDURE — 99213 OFFICE O/P EST LOW 20 MIN: CPT | Mod: 24 | Performed by: OBSTETRICS & GYNECOLOGY

## 2017-09-05 NOTE — PATIENT INSTRUCTIONS
Look into the New Mama class at [place] for a support group    [facility] offers both new parent classes and group exercise classes where you can bring your baby and meet with other new moms.    ECFE classes    Check in with our behavioral therapist, Elke, at [facility] to establish care for therapy.

## 2017-09-05 NOTE — PROGRESS NOTES
Gyn Clinic Visit    CC: Mood check    HPI: Gay Cummins is a 32 year old female  underwent primary elective  section on 17 and returns today for a mood check. She describes herself as overwhelmed and very tearful, but not depressed, not a risk to herself, baby, or others, and in general just needing support. She has heard of new mom classes but has not pursued this. She is struggling with feeling guilty that she is not handling new motherhood better and that she is overwhelmed. Her  is helpful at home but she does not want to burden friends or family with her concerns and she is feeling isolated.  - also reports struggling with breast feeding but has not yet followed up with lactation. Right nipple sore and cracked, she is nursing on the left and pumping on the right.     PHQ-9 = 13, symptoms describes ad somewhat difficult    /70 (BP Location: Left arm, Patient Position: Chair, Cuff Size: Adult Regular)  Wt 177 lb 11.2 oz (80.6 kg)  LMP 2016 (Exact Date)  BMI 27.83 kg/m2   Gen: resting comfortably, tearful  Psych: appears anxious, reports feelings of guilt, mood labile  Abd: soft, minimally tender to palpation, incision clean, dry, and intact    A/P: Gay Cummins is a 32 year old female  1 month postpartum s/p elective primary  section, here for mood concerns.     1. Post partum depression  - reassured Gay that stress, anxiety, and tearfulness are normal responses to this life change and do not make her a bad mother.   - mental health referral placed. Provided contact info for Elke Espinoza to establish a therapeutic relationship  - recommended new parenting classes/groups and provided with resources in [facility] resource page for postpartum support  - recommended return to lactation specialist today  - return in 1 week to check in, call if any concerns before that    Kayla Carlton MD

## 2017-09-05 NOTE — NURSING NOTE
"Chief Complaint   Patient presents with     Surgical Followup     delivered daughter  on      MOOD CHANGES     feeling \"overwhelmed\", crying alot, not feeling like herself       Initial /70 (BP Location: Left arm, Patient Position: Chair, Cuff Size: Adult Regular)  Wt 177 lb 11.2 oz (80.6 kg)  LMP 2016 (Exact Date)  BMI 27.83 kg/m2 Estimated body mass index is 27.83 kg/(m^2) as calculated from the following:    Height as of 17: 5' 7\" (1.702 m).    Weight as of this encounter: 177 lb 11.2 oz (80.6 kg).  Medication Reconciliation: complete     Cindy Webb CMA      "

## 2017-09-05 NOTE — MR AVS SNAPSHOT
After Visit Summary   9/5/2017    Gay Cummins    MRN: [id number]           Patient Information     Date Of Birth          1985        Visit Information        Provider Department      9/5/2017 8:45 AM Kayla Carlton MD [facility]        Today's Diagnoses     Supervision of normal first pregnancy, antepartum          Care Instructions    Look into the New Mama class at [facility] for a support group    [facility] offers both new parent classes and group exercise classes where you can bring your baby and meet with other new moms.    ECFE classes    Check in with our behavioral therapist, Elke, at [facility] to establish care for therapy.              Follow-ups after your visit        Your next 10 appointments already scheduled     Sep 14, 2017  1:00 PM CDT   [facility]    [address]  [facility] [id number] [phone]            Sep 19, 2017  2:00 PM CDT   [facility]    [address]  [facility] [id number] [phone]              Who to contact     If you have questions or need follow up information about today's clinic visit or your schedule please contact [facility] directly at [phone].  Normal or non-critical lab and imaging results will be communicated to you by MyChart, letter or phone within 4 business days after the clinic has received the results. If you do not hear from us within 7 days, please contact the clinic through MyChart or phone. If you have a critical or abnormal lab result, we will notify you by phone as soon as possible.  Submit refill requests through Tactile or call your pharmacy and they will forward the refill request to us. Please allow 3 business days for your refill to be completed.          Additional Information About  Your Visit        TOPSEChart Information     People[employer] gives you secure access to your electronic health record. If you see a primary care provider, you can also send messages to your care team and make appointments. If you have questions, please call your primary care clinic.  If you do not have a primary care provider, please call [phone] and they will assist you.        Care EveryWhere ID     This is your Care EveryWhere ID. This could be used by other organizations to access your [facility] medical records  [id number]        Your Vitals Were     Last Period BMI (Body Mass Index)                11/03/2016 (Exact Date) 27.83 kg/m2           Blood Pressure from Last 3 Encounters:   09/05/17 116/70   08/11/17 114/59   08/03/17 124/78    Weight from Last 3 Encounters:   09/05/17 177 lb 11.2 oz (80.6 kg)   08/08/17 196 lb (88.9 kg)   08/03/17 196 lb (88.9 kg)              Today, you had the following     No orders found for display       Primary Care Provider Office Phone # Fax #    Brittaney BLANCO Mata [phone]       [facility] [address] [place] [address]        Equal Access to Services     ZIA MCHUGH AH: Hadii aad ku hadasho Soomaali, waaxda luqadaha, qaybta kaalmada adeegyada, charu vargas lajorge a ah. So [facility] [phone].    ATENCIÓN: Si habla español, tiene a salazar disposición servicios gratuitos de asistencia lingüística. Llame al [phone].    We comply with applicable federal civil rights laws and [place] laws. We do not discriminate on the basis of race, color, national origin, age, disability sex, sexual orientation or gender identity.            Thank you!     Thank you for choosing [facility]  for your care. Our goal is always to provide you with excellent care. Hearing back from our patients is one way we can continue to improve our services. Please take a few minutes to complete the written survey that you may receive in the mail after  your visit with us. Thank you!             Your Updated Medication List - Protect others around you: Learn how to safely use, store and throw away your medicines at www.disposemymeds.org.          This list is accurate as of: 17  9:17 AM.  Always use your most recent med list.                   Brand Name Dispense Instructions for use Diagnosis    acetaminophen 325 MG tablet    TYLENOL    100 tablet    Take 2 tablets (650 mg) by mouth every 4 hours as needed for other (surgical pain)    S/P  section       breast pump Misc     1 each    1 each as needed    Supervision of normal first pregnancy, antepartum       FISH OIL OMEGA-3 1000 MG Caps      Take 1 capsule by mouth daily        PRENATAL VIT-FE BISG-FA-DHA PO      Take 1 tablet by mouth daily        senna-docusate 8.6-50 MG per tablet    SENOKOT-S;PERICOLACE    100 tablet    Take 1-2 tablets by mouth 2 times daily    S/P  section

## 2017-09-08 ENCOUNTER — OFFICE VISIT (OUTPATIENT)
Dept: PEDIATRICS | Facility: CLINIC | Age: 32
End: 2017-09-08
Payer: COMMERCIAL

## 2017-09-08 VITALS
WEIGHT: 175.8 LBS | HEART RATE: 83 BPM | DIASTOLIC BLOOD PRESSURE: 68 MMHG | SYSTOLIC BLOOD PRESSURE: 107 MMHG | TEMPERATURE: 96.3 F | BODY MASS INDEX: 27.53 KG/M2

## 2017-09-08 DIAGNOSIS — F41.8 POSTPARTUM ANXIETY: Primary | ICD-10-CM

## 2017-09-08 DIAGNOSIS — N64.4 NIPPLE PAIN: ICD-10-CM

## 2017-09-08 PROBLEM — Z98.891 S/P CESAREAN SECTION: Status: RESOLVED | Noted: 2017-08-08 | Resolved: 2017-09-08

## 2017-09-08 PROBLEM — Z33.1 PREGNANCY, INCIDENTAL: Status: RESOLVED | Noted: 2017-07-13 | Resolved: 2017-09-08

## 2017-09-08 PROBLEM — Z34.00 SUPERVISION OF NORMAL FIRST PREGNANCY, ANTEPARTUM: Status: RESOLVED | Noted: 2017-01-11 | Resolved: 2017-09-08

## 2017-09-08 PROCEDURE — 99215 OFFICE O/P EST HI 40 MIN: CPT | Performed by: NURSE PRACTITIONER

## 2017-09-08 RX ORDER — CITALOPRAM HYDROBROMIDE 10 MG/1
TABLET ORAL
Qty: 120 TABLET | Refills: 0 | Status: SHIPPED | OUTPATIENT
Start: 2017-09-08 | End: 2018-03-16 | Stop reason: DRUGHIGH

## 2017-09-08 NOTE — NURSING NOTE
"Chief Complaint   Patient presents with     Lactation Consult       Initial /68  Pulse 83  Temp 96.3  F (35.7  C) (Tympanic)  Wt 175 lb 12.8 oz (79.7 kg)  LMP 11/03/2016 (Exact Date)  BMI 27.53 kg/m2 Estimated body mass index is 27.53 kg/(m^2) as calculated from the following:    Height as of 8/8/17: 5' 7\" (1.702 m).    Weight as of this encounter: 175 lb 12.8 oz (79.7 kg).  Medication Reconciliation: complete  "

## 2017-09-08 NOTE — MR AVS SNAPSHOT
After Visit Summary   9/8/2017    Gay Cummins    MRN: [id number]           Patient Information     Date Of Birth          1985        Visit Information        Provider Department      9/8/2017 1:30 PM Lakshmi Nathan APRN CNP [facility]        Today's Diagnoses     Postpartum anxiety    -  1    Nipple pain          Care Instructions    Depression and/or Anxiety:  A prescription has been faxed to your pharmacy.  Continue to work on a healthy diet and routine exercise and consider a therapist.  Schedule a follow up appointment in about 4-6 weeks with your progress, or sooner if you have any questions or concerns.  Be aware of the very smalll risk of increased symptoms of depression and/or anxiety when starting these medications.                Follow-ups after your visit        Your next 10 appointments already scheduled     Sep 12, 2017  2:30 PM CDT   [facility] SHAHZAD Pinto   [facility]    [address]  [facility] [id number] [phone]            Sep 14, 2017  1:00 PM CDT   Post Partum with Kayla Carlton MD   [facility]    [address] [place] [address] [phone]            Nov 03, 2017  1:15 PM CDT   New Visit with Marta Guevara NP   [facility]    [address]  [address]  [facility] [address] [phone]              Who to contact     If you have questions or need follow up information about today's clinic visit or your schedule please contact [facility] directly at [phone].  Normal or non-critical lab and imaging results will be communicated to you by MyChart, letter or phone within 4 business days after the clinic has received the results. If you do not hear from us within 7 days, please contact the clinic through MyChart or phone.  If you have a critical or abnormal lab result, we will notify you by phone as soon as possible.  Submit refill requests through Twiigg or call your pharmacy and they will forward the refill request to us. Please allow 3 business days for your refill to be completed.          Additional Information About Your Visit        Flex Pharmahart Information     Twiigg gives you secure access to your electronic health record. If you see a primary care provider, you can also send messages to your care team and make appointments. If you have questions, please call your primary care clinic.  If you do not have a primary care provider, please call [phone] and they will assist you.        Care EveryWhere ID     This is your Care EveryWhere ID. This could be used by other organizations to access your [facility] medical records  [id number]        Your Vitals Were     Pulse Temperature Last Period BMI (Body Mass Index)          83 96.3  F (35.7  C) (Tympanic) 11/03/2016 (Exact Date) 27.53 kg/m2         Blood Pressure from Last 3 Encounters:   09/08/17 107/68   09/05/17 116/70   08/11/17 114/59    Weight from Last 3 Encounters:   09/08/17 175 lb 12.8 oz (79.7 kg)   09/05/17 177 lb 11.2 oz (80.6 kg)   08/08/17 196 lb (88.9 kg)              Today, you had the following     No orders found for display         Today's Medication Changes          These changes are accurate as of: 9/8/17  2:39 PM.  If you have any questions, ask your nurse or doctor.               Start taking these medicines.        Dose/Directions    citalopram 10 MG tablet   Commonly known as:  celeXA   Used for:  Postpartum anxiety   Started by:  Lakshmi Nathan, APRN CNP        Take one tab PO daily x 1 wk, then take TWO tabs daily   Quantity:  120 tablet   Refills:  0            Where to get your medicines      These medications were sent to [facility]/pharmacy #[id number] - [place] - [address]  [address] [facility] [address]     Phone:   [phone]     citalopram 10 MG tablet                Primary Care Provider Office Phone # Fax #    Kayla Carlton [phone]       [place] [address] [facility] [address]        Equal Access to Services     SARIAHZIA JEISON : Hadii aad ku hadasho Soomaali, waaxda luqadaha, qaybta kaalmada adeegyada, waxay idiin hayaan adeeg khcory lajorge a rodriguez. So [facility] [phone].    ATENCIÓN: Si habla español, tiene a salazar disposición servicios gratuitos de asistencia lingüística. Llame al [phone].    We comply with applicable federal civil rights laws and [place] laws. We do not discriminate on the basis of race, color, national origin, age, disability sex, sexual orientation or gender identity.            Thank you!     Thank you for choosing [facility]  for your care. Our goal is always to provide you with excellent care. Hearing back from our patients is one way we can continue to improve our services. Please take a few minutes to complete the written survey that you may receive in the mail after your visit with us. Thank you!             Your Updated Medication List - Protect others around you: Learn how to safely use, store and throw away your medicines at www.disposemymeds.org.          This list is accurate as of: 17  2:39 PM.  Always use your most recent med list.                   Brand Name Dispense Instructions for use Diagnosis    acetaminophen 325 MG tablet    TYLENOL    100 tablet    Take 2 tablets (650 mg) by mouth every 4 hours as needed for other (surgical pain)    S/P  section       breast pump Misc     1 each    1 each as needed    Supervision of normal first pregnancy, antepartum       citalopram 10 MG tablet    celeXA    120 tablet    Take one tab PO daily x 1 wk, then take TWO tabs daily    Postpartum anxiety       FISH OIL OMEGA-3 1000 MG Caps      Take 1 capsule by mouth daily        PRENATAL VIT-FE BISG-FA-DHA PO      Take 1 tablet  by mouth daily        senna-docusate 8.6-50 MG per tablet    SENOKOT-S;PERICOLACE    100 tablet    Take 1-2 tablets by mouth 2 times daily    S/P  section

## 2017-09-08 NOTE — PROGRESS NOTES
SUBJECTIVE    Gay Cummins, a 32 year old female is here with baby for breastfeeding consultation as requested by   and weight check. Baby name: robert Cummins, Baby age: 5 wks    Mom presents to clinic with baby today with the chief complaint of nipple pain and using shield. She reports she is directly feeding the baby Q 1.5-3 hrs for approximately 20 minutes and she is pumping her breasts about every 3 hrs.  Patient is not supplementing the direct breast feeds with NA oz of formula.     Mom reports the following nipple complaints: pain with latch, difficulty getting latch  She has not had breast engorgement problems.  She denies engorgement symptoms of her axilla.  She denies a history of endocrine problems such as hypothyroidism, diabetes, gestational diabetes.  She reports significant changes to breast size during pregnancy.     The baby was not dealing with hyperbilirubinemia, growth problems, cleft palate or any other oropharynx problems, hypoglycemia.     Also, she notes symptoms of increased anxiety since the birth of her baby. She denies thoughts of self harm or suicide and denies thoughts of harming her baby. She notes she feels overwhelmed and difficult coping with the increased fussiness of the baby. She notes her  is very supportive, and is aware of her increased anxiety. She notes she had a remote history of medicated anxiety in high school and can't remember which medication she was on.     ROS:  7-Point Review of Systems Negative-- Except as stated above.    OBJECTIVE  /68  Pulse 83  Temp 96.3  F (35.7  C) (Tympanic)  Wt 175 lb 12.8 oz (79.7 kg)  LMP 2016 (Exact Date)  BMI 27.53 kg/m2  A latch was observed today.    Latch:  2 - Good Latch  Audible Swallowin - Spontaneous & frequent  Type of Nipple:  2 - Everted  Comfort+: 2 - Soft, Nontender  Hold:  1 - Min. Assist  Suckin - Long, slow, continuous  TOTAL LATCHES SCORE:  11    Exam:  Constitutional: healthy, alert  "and no distress  Breasts: Breasts are symmetric, without breast or nipple rash, redness, warmth. Nipple exam: wnl, no rash c/w thrush.   Psych: Psychiatric: mentation appears normal and affect normal/bright    ASSESSMENT  ASSESSMENT / PLAN:  (O99.345,  F41.1) Postpartum anxiety  (primary encounter diagnosis)  Comment: we discussed her symptoms in depth today, and she is willing to do medication. We also discussed support for her in the home including her , taking breaks, and the role of breastfeeding. She will return to clinic to see OBGYN for postpartum visit next wk, and see me in 1 mo. Black box warning reviewed. Crisis numbers given  Plan: citalopram (CELEXA) 10 MG tablet          (N64.4) Nipple pain  Comment: latch looks great, encouraged to continue using the shield and not worryr about d/c'ing that. Ok to breastfeed on demand. She has an excellent supply, no need to pump extra to bring in more supply. She could pump in the morning hrs 1 hr after feeds ONCE per day to start building stock, baby does not have a \"hunger\" issue, but more of a temperament issue. Reassurance given.   Plan:   We reviewed maternal benefits to breastfeeding including decrease in postpartum depression, decreased postpartum bleeding, contraception.     Patient Instructions   Depression and/or Anxiety:  A prescription has been faxed to your pharmacy.  Continue to work on a healthy diet and routine exercise and consider a therapist.  Schedule a follow up appointment in about 4-6 weeks with your progress, or sooner if you have any questions or concerns.  Be aware of the very smalll risk of increased symptoms of depression and/or anxiety when starting these medications.        40 minutes was spent face-to-face with the patient and family, 100% time spent counseling regarding breastfeeding issues as stated in patient instructions and plan of care.    "

## 2017-09-08 NOTE — PATIENT INSTRUCTIONS
Depression and/or Anxiety:  A prescription has been faxed to your pharmacy.  Continue to work on a healthy diet and routine exercise and consider a therapist.  Schedule a follow up appointment in about 4-6 weeks with your progress, or sooner if you have any questions or concerns.  Be aware of the very smalll risk of increased symptoms of depression and/or anxiety when starting these medications.

## 2017-09-12 ENCOUNTER — OFFICE VISIT (OUTPATIENT)
Dept: BEHAVIORAL HEALTH | Facility: CLINIC | Age: 32
End: 2017-09-12
Payer: COMMERCIAL

## 2017-09-12 DIAGNOSIS — F41.1 GAD (GENERALIZED ANXIETY DISORDER): ICD-10-CM

## 2017-09-12 DIAGNOSIS — F33.1 MODERATE EPISODE OF RECURRENT MAJOR DEPRESSIVE DISORDER (H): Primary | ICD-10-CM

## 2017-09-12 PROBLEM — F41.8 POSTPARTUM ANXIETY: Status: ACTIVE | Noted: 2017-09-12

## 2017-09-12 PROBLEM — F41.8 POSTPARTUM ANXIETY: Status: ACTIVE | Noted: 2017-09-08

## 2017-09-12 PROCEDURE — 90837 PSYTX W PT 60 MINUTES: CPT | Performed by: MARRIAGE & FAMILY THERAPIST

## 2017-09-12 ASSESSMENT — PATIENT HEALTH QUESTIONNAIRE - PHQ9
SUM OF ALL RESPONSES TO PHQ QUESTIONS 1-9: 16
5. POOR APPETITE OR OVEREATING: NEARLY EVERY DAY

## 2017-09-12 ASSESSMENT — ANXIETY QUESTIONNAIRES
5. BEING SO RESTLESS THAT IT IS HARD TO SIT STILL: SEVERAL DAYS
7. FEELING AFRAID AS IF SOMETHING AWFUL MIGHT HAPPEN: NOT AT ALL
3. WORRYING TOO MUCH ABOUT DIFFERENT THINGS: NEARLY EVERY DAY
IF YOU CHECKED OFF ANY PROBLEMS ON THIS QUESTIONNAIRE, HOW DIFFICULT HAVE THESE PROBLEMS MADE IT FOR YOU TO DO YOUR WORK, TAKE CARE OF THINGS AT HOME, OR GET ALONG WITH OTHER PEOPLE: VERY DIFFICULT
1. FEELING NERVOUS, ANXIOUS, OR ON EDGE: MORE THAN HALF THE DAYS
6. BECOMING EASILY ANNOYED OR IRRITABLE: MORE THAN HALF THE DAYS
2. NOT BEING ABLE TO STOP OR CONTROL WORRYING: MORE THAN HALF THE DAYS
GAD7 TOTAL SCORE: 13

## 2017-09-12 NOTE — MR AVS SNAPSHOT
After Visit Summary   9/12/2017    Gay Cummins    MRN: [id number]           Patient Information     Date Of Birth          1985        Visit Information        Provider Department      9/12/2017 2:30 PM Tracy Cardoza LMFT [facility]        Today's Diagnoses     Moderate episode of recurrent major depressive disorder (H)    -  1    SANDEEP (generalized anxiety disorder)           Follow-ups after your visit        Your next 10 appointments already scheduled     Sep 14, 2017  1:00 PM CDT   Post Partum with Kayla Carlton MD   [facility]    [address] [place] [address]   [phone]            Sep 19, 2017  2:30 PM CDT   Return Visit with SHAHZAD Pinto   [facility] ([facility]    [address] [address]   [phone]            Oct 10, 2017  1:45 PM CDT   SHORT with TED [facility] ([facility]    [address]  [address]  [facility] [address]   [phone]            Nov 03, 2017  1:15 PM CDT   New Visit with Marta Guevara NP   [facility] ([facility])    [address]  [address]  [facility] [id number] [phone]              Who to contact     If you have questions or need follow up information about today's clinic visit or your schedule please contact [facility] directly at [phone].  Normal or non-critical lab and imaging results will be communicated to you by MyChart, letter or phone within 4 business days after the clinic has received the results. If you do not hear from us within 7 days, please contact the clinic through MyChart or phone. If you have a critical or abnormal lab result, we will notify you by phone as soon as possible.  Submit refill requests through [employer] or call your  pharmacy and they will forward the refill request to us. Please allow 3 business days for your refill to be completed.          Additional Information About Your Visit        MyChart Information     [employer] gives you secure access to your electronic health record. If you see a primary care provider, you can also send messages to your care team and make appointments. If you have questions, please call your primary care clinic.  If you do not have a primary care provider, please call [phone] and they will assist you.        Care EveryWhere ID     This is your Care EveryWhere ID. This could be used by other organizations to access your [facility] medical records  [id number]         Blood Pressure from Last 3 Encounters:   09/08/17 107/68   09/05/17 116/70   08/11/17 114/59    Weight from Last 3 Encounters:   09/08/17 175 lb 12.8 oz (79.7 kg)   09/05/17 177 lb 11.2 oz (80.6 kg)   08/08/17 196 lb (88.9 kg)              Today, you had the following     No orders found for display       Primary Care Provider Office Phone # Fax #    Kayla Carlton [phone]       [facility] [address] [facility] [address] [place] [address]        Equal Access to Services     ALAN MCHUGH : Hadii aad ku hadasho Soomaali, waaxda luqadaha, qaybta kaalmada adeegyada, charu crain hayfredon tomi rodriguez. So [facility] [phone].    ATENCIÓN: Si habla español, tiene a salazar disposición servicios gratuitos de asistencia lingüística. Llame al [phone].    We comply with applicable federal civil rights laws and [place] laws. We do not discriminate on the basis of race, color, national origin, age, disability sex, sexual orientation or gender identity.            Thank you!     Thank you for choosing [facility]  for your care. Our goal is always to provide you with excellent care. Hearing back from our patients is one way we can continue to improve our services. Please take a few minutes to  complete the written survey that you may receive in the mail after your visit with us. Thank you!             Your Updated Medication List - Protect others around you: Learn how to safely use, store and throw away your medicines at www.disposemymeds.org.          This list is accurate as of: 17  4:05 PM.  Always use your most recent med list.                   Brand Name Dispense Instructions for use Diagnosis    acetaminophen 325 MG tablet    TYLENOL    100 tablet    Take 2 tablets (650 mg) by mouth every 4 hours as needed for other (surgical pain)    S/P  section       breast pump Misc     1 each    1 each as needed    Supervision of normal first pregnancy, antepartum       citalopram 10 MG tablet    celeXA    120 tablet    Take one tab PO daily x 1 wk, then take TWO tabs daily    Postpartum anxiety       FISH OIL OMEGA-3 1000 MG Caps      Take 1 capsule by mouth daily        PRENATAL VIT-FE BISG-FA-DHA PO      Take 1 tablet by mouth daily        senna-docusate 8.6-50 MG per tablet    SENOKOT-S;PERICOLACE    100 tablet    Take 1-2 tablets by mouth 2 times daily    S/P  section

## 2017-09-12 NOTE — PROGRESS NOTES
[facility]  Integrated Behavioral Health Services   Diagnostic Assessment      PATIENT'S NAME: Gay Cummins  MRN:   [id number]  :   1985  DATE OF SERVICE: 2017  SERVICE LOCATION: Face to Face in Clinic  Visit Activities: NEW and [facility] Only      Identifying Information:  Patient is a 32 year old year old, ,  female.  Patient attended the session alone.        Referral:  Patient was referred for an assessment by Dr. Carlton at [facility].   Reason for referral: clarify behavioral health diagnosis, determine behavioral health treatment options, assess client readiness and motivation to change, assess client social situation, address the interaction of behavioral and medical issues and consultation on complex comorbid conditions.       Patient's Statement of Presenting Concern:  Patient reports the following reason(s) for seeking an assessment at this time: increased anxiety and depression since the birth of her first child five weeks ago..  Patient stated that her symptoms have resulted in the following functional impairments: childcare / parenting, health maintenance, relationship(s), self-care, social interactions and work / vocational responsibilities. Patient has recently started on Celxa yesterday. She has been struggling with       History of Presenting Concern:  Patient reports that these problem(s) began 5 weeks. Patient has attempted to resolve these concerns in the past through: counseling, medication(s) from physician / PCP and physician / PCP. Patient reports that other professional(s) are involved in providing support / services. Dr. Carlton. Patient states that in the past she struggled with anxiety and depression in the past.       Social History:  Patient reported she grew up in [place], [place]. They were the first born of 2 children.  Patient reported that her childhood was good.  Patient reported a  history of 2 committed relationships or marriages.  one year in 2008. Patient has been  for 1 years. Patient reported having 1 children. Patient identified some stable and meaningful social connections.     Patient lives with - Yenny and her daughter Sallie.  Patient is currently employed full time.  Work history H[employer] for 5 years. 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 She is on a 12 week leave.     Patient reported that she has  been involved with the legal system. DWI in 2011 Patient's highest education level was college graduate. Patient did not identify any learning problems. There are no ethnic, cultural or Muslim factors that may be relevant for therapy.  Patient did not serve in the .       Mental Health History:  Patient reported the following biological family members or relatives with mental health issues: Mother experienced Anxiety and Depression, Sister experienced Anxiety and Depression. Patient has received the following mental health services in the past: counseling. Patient is not currently receiving any mental health services.  Pr was recently started on Celexa.     Chemical Health History:  Patient reported the following biological family members or relatives with chemical health issues: Mother reportedly uses alcohol . Patient has not received chemical dependency treatment in the past. Patient is not currently receiving any chemical dependency treatment. Patient reports no problems as a result of their drinking / drug use.  Currently not drinking.     Cage-AID score is: 0 Based on Cage-Aid score and clinical interview there  are not indications of drug or alcohol abuse.      Discussed the general effects of drugs and alcohol on health and well-being.      Significant Losses / Trauma / Abuse / Neglect Issues:  There are no indications or report of: significant losses, trauma, abuse or neglect.    Issues of possible neglect are not present.      Medical History:   Patient Active  Problem List   Diagnosis     Sprain of symphysis pubis     Sacroiliac joint pain     Postpartum anxiety       Medication Review:  Current Outpatient Prescriptions   Medication     citalopram (CELEXA) 10 MG tablet     acetaminophen (TYLENOL) 325 MG tablet     senna-docusate (SENOKOT-S;PERICOLACE) 8.6-50 MG per tablet     Misc. Devices (BREAST PUMP) MISC     Omega-3 Fatty Acids (FISH OIL OMEGA-3) 1000 MG CAPS     PRENATAL VIT-FE BISG-FA-DHA PO     No current facility-administered medications for this visit.        Patient was provided recommendation to follow-up with physician.    Mental Status Assessment:  Appearance:   Appropriate   Eye Contact:   Good   Psychomotor Behavior: Normal   Attitude:   Cooperative   Orientation:   All  Speech   Rate / Production: Normal    Volume:  Normal   Mood:    Anxious  Sad   Affect:    Flat  Worrisome   Thought Content:  Clear   Thought Form:  Coherent  Logical   Insight:    Fair       Safety Assessment:    Patient has had a history of suicidal ideation: in High School, Never had attempts.   Patient denies current or recent suicidal ideation or behaviors.  Patient denies current or recent homicidal ideation or behaviors.  Patient denies current or recent self injurious behavior or ideation.  Patient denies other safety concerns.  Patient reports there are no firearms in the house  Protective Factors Children in the home    Risk Factors Lack of sleep      Plan for Safety and Risk Management:  A safety and risk management plan has not been developed at this time, however patient was encouraged to call [facility] should there be a change in any of these risk factors.      Review of Symptoms:  Depression: Sleep Interest Guilt Energy Concentration Appetite Helpless Worthless Ruminations  Jo Ann:  No symptoms  Psychosis: No symptoms  Anxiety: Worries Nervousness  Panic:  No symptoms  Post Traumatic Stress Disorder: No symptoms  Obsessive Compulsive Disorder: No symptoms  Eating  Disorder: No symptoms  Oppositional Defiant Disorder: No symptoms  ADD / ADHD: No symptoms  Conduct Disorder: No symptoms    Patient's Strengths and Limitations:  Patient identified the following strengths or resources that will help her succeed in counseling: commitment to health and well being, friends / good social support, family support and positive work environment. Patient identified the following supports: family and friends. Things that may interfere with the patien'ts success in behavioral health services include:lack of social support.    Diagnostic Criteria:  A. Excessive anxiety and worry about a number of events or activities (such as work or school performance).   B. The person finds it difficult to control the worry.  C. Select 3 or more symptoms (required for diagnosis). Only one item is required in children.   - Restlessness or feeling keyed up or on edge.    - Being easily fatigued.    - Difficulty concentrating or mind going blank.    - Irritability.    - Muscle tension.    - Sleep disturbance (difficulty falling or staying asleep, or restless unsatisfying sleep).   D. The focus of the anxiety and worry is not confined to features of an Axis I disorder.  E. The anxiety, worry, or physical symptoms cause clinically significant distress or impairment in social, occupational, or other important areas of functioning.   F. The disturbance is not due to the direct physiological effects of a substance (e.g., a drug of abuse, a medication) or a general medical condition (e.g., hyperthyroidism) and does not occur exclusively during a Mood Disorder, a Psychotic Disorder, or a Pervasive Developmental Disorder.  A) Recurrent episode(s) - symptoms have been present during the same 2-week period and represent a change from previous functioning 5 or more symptoms (required for diagnosis)   - Depressed mood. Note: In children and adolescents, can be irritable mood.     - Diminished interest or pleasure in all,  or almost all, activities.    - Significant weight gainincrease in appetite.    - Decreased sleep.    - Psychomotor activity agitation.    - Fatigue or loss of energy.    - Feelings of worthlessness or inappropriate and excessive guilt.    - Diminished ability to think or concentrate, or indecisiveness.   B) The symptoms cause clinically significant distress or impairment in social, occupational, or other important areas of functioning  C) The episode is not attributable to the physiological effects of a substance or to another medical condition  D) The occurence of major depressive episode is not better explained by other thought / psychotic disorders  E) There has never been a manic episode or hypomanic episode      Functional Status:  Patient's symptoms are causing reduced functional status in the following areas: Activities of Daily Living - struggling with beig a new mom      DSM5 Diagnoses: (Sustained by DSM5 Criteria Listed Above)  Diagnoses: 296.32 (F33.1) Major Depressive Disorder, Recurrent Episode, Moderate _ and With anxious distress  300.02 (F41.1) Generalized Anxiety Disorder  Psychosocial & Contextual Factors: new baby  WHODAS Score: not completed  See Media section of EPIC medical record for completed WHODAS    Preliminary Treatment Plan:    The client reports no currently identified Christianity, ethnic or cultural issues relevant to therapy.    Initial Treatment will focus on: Depressed Mood - decrease sadness  Anxiety - increase coping skills  Adjustment Difficulties related to: new baby.    Chemical dependency recommendations: No indications of CD issues    As a preliminary treatment goal, patient will experience a reduction in depressed mood, will develop more effective coping skills to manage depressive symptoms, will develop healthy cognitive patterns and beliefs and will increase ability to function adaptively and will experience a reduction in anxiety, will develop more effective coping skills  to manage anxiety symptoms, will develop healthy cognitive patterns and beliefs and will increase ability to function adaptively.    The focus of initial interventions will be to alleviate anxiety, alleviate depressed mood, increase coping skills and teach effective parenting skills.    Collaboration with other professionals is not indicated at this time.    Referral to another professional/service is not indicated at this time..    A Release of Information is not needed at this time.    Report to child or adult protection services was NA.    SHAHZAD Pinto, Behavioral Health Clinician

## 2017-09-13 ASSESSMENT — ANXIETY QUESTIONNAIRES: GAD7 TOTAL SCORE: 13

## 2017-09-14 ENCOUNTER — PRENATAL OFFICE VISIT (OUTPATIENT)
Dept: OBGYN | Facility: CLINIC | Age: 32
End: 2017-09-14
Payer: COMMERCIAL

## 2017-09-14 VITALS
SYSTOLIC BLOOD PRESSURE: 114 MMHG | HEIGHT: 67 IN | DIASTOLIC BLOOD PRESSURE: 68 MMHG | WEIGHT: 175.4 LBS | BODY MASS INDEX: 27.53 KG/M2

## 2017-09-14 DIAGNOSIS — Z30.013 ENCOUNTER FOR INITIAL PRESCRIPTION OF INJECTABLE CONTRACEPTIVE: ICD-10-CM

## 2017-09-14 PROCEDURE — 99207 ZZC POST PARTUM EXAM: CPT | Performed by: OBSTETRICS & GYNECOLOGY

## 2017-09-14 PROCEDURE — 96372 THER/PROPH/DIAG INJ SC/IM: CPT | Performed by: OBSTETRICS & GYNECOLOGY

## 2017-09-14 RX ORDER — MEDROXYPROGESTERONE ACETATE 150 MG/ML
150 INJECTION, SUSPENSION INTRAMUSCULAR
Qty: 1 ML | Refills: 3 | OUTPATIENT
Start: 2017-09-14 | End: 2018-10-08

## 2017-09-14 ASSESSMENT — PATIENT HEALTH QUESTIONNAIRE - PHQ9: SUM OF ALL RESPONSES TO PHQ QUESTIONS 1-9: 8

## 2017-09-14 NOTE — MR AVS SNAPSHOT
After Visit Summary   9/14/2017    Gay Cummins    MRN: [id number]           Patient Information     Date Of Birth          1985        Visit Information        Provider Department      9/14/2017 1:00 PM Kayla Carlton MD [facility]        Today's Diagnoses     Routine postpartum follow-up    -  1    Encounter for initial prescription of injectable contraceptive        Postpartum depression           Follow-ups after your visit        Your next 10 appointments already scheduled     Sep 19, 2017  2:30 PM CDT   [facility]AD [facility]   [facility]    [address]  [facility] [address] [phone]            Oct 10, 2017  1:45 PM CDT   SHORT with TED Mcclure CNP   [facility]    [address]  [address]  [facility] [id number] [phone]            Nov 03, 2017  1:15 PM CDT   New Visit with Marta Guevara NP   [facility]    [address]  [address]  [facility] [id number] [phone]              Who to contact     If you have questions or need follow up information about today's clinic visit or your schedule please contact [facility] directly at [phone].  Normal or non-critical lab and imaging results will be communicated to you by MyChart, letter or phone within 4 business days after the clinic has received the results. If you do not hear from us within 7 days, please contact the clinic through MyChart or phone. If you have a critical or abnormal lab result, we will notify you by phone as soon as possible.  Submit refill requests through [employer] or call your pharmacy and they will forward the refill request to us. Please allow 3 business days for your refill to be completed.          Additional Information About Your Visit       "  Golf121hart Information     [employer] gives you secure access to your electronic health record. If you see a primary care provider, you can also send messages to your care team and make appointments. If you have questions, please call your primary care clinic.  If you do not have a primary care provider, please call [phone] and they will assist you.        Care EveryWhere ID     This is your Care EveryWhere ID. This could be used by other organizations to access your [place] medical records  [id number]        Your Vitals Were     Height BMI (Body Mass Index)                5' 7\" (1.702 m) 27.47 kg/m2           Blood Pressure from Last 3 Encounters:   09/14/17 114/68   09/08/17 107/68   09/05/17 116/70    Weight from Last 3 Encounters:   09/14/17 175 lb 6.4 oz (79.6 kg)   09/08/17 175 lb 12.8 oz (79.7 kg)   09/05/17 177 lb 11.2 oz (80.6 kg)              Today, you had the following     No orders found for display         Today's Medication Changes          These changes are accurate as of: 9/14/17  1:52 PM.  If you have any questions, ask your nurse or doctor.               Start taking these medicines.        Dose/Directions    medroxyPROGESTERone 150 MG/ML injection   Commonly known as:  DEPO-PROVERA   Used for:  Encounter for initial prescription of injectable contraceptive   Started by:  Kayla Carlton MD        Dose:  150 mg   Inject 1 mL (150 mg) into the muscle every 3 months   Quantity:  1 mL   Refills:  3            Where to get your medicines      Some of these will need a paper prescription and others can be bought over the counter.  Ask your nurse if you have questions.     You don't need a prescription for these medications     medroxyPROGESTERone 150 MG/ML injection                Primary Care Provider Office Phone # Fax #    Kayla Carlton [phone]       [place] [address] [address] [facility] [facility] [address]        Equal Access to Services     ALAN MCHUGH AH: " Hadii aad ku haddiontejessie Somejiaali, waaxda luqadaha, qaybta kaaldebbie carvalho, charu kavithain hayaan alejandramichael rodriguez. So [facility] [phone].    ATENCIÓN: Si gloria su, tiene a salazar disposición servicios gratuitos de asistencia lingüística. Llame al [phone].    We comply with applicable federal civil rights laws and [place] laws. We do not discriminate on the basis of race, color, national origin, age, disability sex, sexual orientation or gender identity.            Thank you!     Thank you for choosing [facility]  for your care. Our goal is always to provide you with excellent care. Hearing back from our patients is one way we can continue to improve our services. Please take a few minutes to complete the written survey that you may receive in the mail after your visit with us. Thank you!             Your Updated Medication List - Protect others around you: Learn how to safely use, store and throw away your medicines at www.disposemymeds.org.          This list is accurate as of: 17  1:52 PM.  Always use your most recent med list.                   Brand Name Dispense Instructions for use Diagnosis    acetaminophen 325 MG tablet    TYLENOL    100 tablet    Take 2 tablets (650 mg) by mouth every 4 hours as needed for other (surgical pain)    S/P  section       breast pump Misc     1 each    1 each as needed    Supervision of normal first pregnancy, antepartum       citalopram 10 MG tablet    celeXA    120 tablet    Take one tab PO daily x 1 wk, then take TWO tabs daily    Postpartum anxiety       FISH OIL OMEGA-3 1000 MG Caps      Take 1 capsule by mouth daily        medroxyPROGESTERone 150 MG/ML injection    DEPO-PROVERA    1 mL    Inject 1 mL (150 mg) into the muscle every 3 months    Encounter for initial prescription of injectable contraceptive       PRENATAL VIT-FE BISG-FA-DHA PO      Take 1 tablet by mouth daily        senna-docusate 8.6-50 MG per tablet     SENOKOT-S;PERICOLACE    100 tablet    Take 1-2 tablets by mouth 2 times daily    S/P  section

## 2017-09-14 NOTE — NURSING NOTE
"Chief Complaint   Patient presents with     Post Partum Exam     planned  17--discuss birth control--discuss prenatal       Initial /68  Ht 5' 7\" (1.702 m)  Wt 175 lb 6.4 oz (79.6 kg)  BMI 27.47 kg/m2 Estimated body mass index is 27.47 kg/(m^2) as calculated from the following:    Height as of this encounter: 5' 7\" (1.702 m).    Weight as of this encounter: 175 lb 6.4 oz (79.6 kg).  Medication Reconciliation: complete   Tia Nice CMA      "

## 2017-09-14 NOTE — PROGRESS NOTES
Initial Depo Injection     Is the patient within 1st 5 days of a normal period?   No  Is the patient post delivery ?      Yes  If yes, is she breastfeeding?  Yes:   If yes breastfeeding, shot given within 6 weeks after delivery?    Yes: .  If no breastfeeding, shot given within 5 days of delivery?  N/A    BP Readings from Last 1 Encounters:   09/14/17 114/68     No LMP recorded. Patient is not currently having periods (Reason: Breast Feeding).    Date range to return is given to patient for her next dose: 12/6/2017     See Medication Note for administration information    Staff Sig: Sumi MCLEAN

## 2017-09-14 NOTE — PROGRESS NOTES
"SUBJECTIVE: Gay is here for a 6-week postpartum checkup.    Delivery date was 17. She had a planned  of a viable girl, weight 7 pounds 5 oz., with no complications.  Since delivery, she has been breast feeding.  She has no signs of infection, bleeding or other complications.  She is not pregnant.  We discussed contraceptions and she has chosen depo provera for now and is considering oral contraceptives vs LARC in the future.  She  has not had intercourse since delivery and complains of No discomfort. Patient screened for postpartum depression for which she recently started celexa and behavioral therapy. She is also meeting with a new mother group through [employer] and enjoys the support this offers. She states that today is a good day but her mood does fluctuate often. Does not feel she is a threat to herself or others. Screening has also been completed for intimate partner violence.    EXAM:  Today's Depression Rating was 8  PHQ-9 SCORE 2017   Total Score 16     /68  Ht 5' 7\" (1.702 m)  Wt 175 lb 6.4 oz (79.6 kg)  BMI 27.47 kg/m2   Gen: sitting in chair in no acute distress, comfortable  Eyes: no scleral icterus, EOMI  CV: regular rate, well perfused  Pulm: no increased work of breathing, no cough  Abd: soft, non-tender to palpation, no organomegaly, no palpable masses. Incision clean, dry, and intact  Skin: warm and dry, no rashes/lesions  Psych: mood stable, appropriate affect  Neuro: A+Ox3     ASSESSMENT:   Normal postpartum exam after primary  section.    PLAN:  1. Encounter for initial prescription of injectable contraceptive  - medroxyPROGESTERone (DEPO-PROVERA) 150 MG/ML injection; Inject 1 mL (150 mg) into the muscle every 3 months  Dispense: 1 mL; Refill: 3  - reviewed options for oral contraceptives, pop, and LARCs. Patient will consider and may return in January to discuss alternative options to depo provera.     2. Postpartum depression  - recently started celexa, " tolerating well  - meeting with behavioral health regularly  - looking into community support groups    3. Routine postpartum follow-up  - incision clean and dry.  - ok to return to normal physical activity and sexual intercourse    Return as needed or at time of next expected pap, pelvic, or breast exam.    Kayla Carlton MD

## 2017-09-19 ENCOUNTER — OFFICE VISIT (OUTPATIENT)
Dept: BEHAVIORAL HEALTH | Facility: CLINIC | Age: 32
End: 2017-09-19
Payer: COMMERCIAL

## 2017-09-19 DIAGNOSIS — F33.1 MODERATE EPISODE OF RECURRENT MAJOR DEPRESSIVE DISORDER (H): ICD-10-CM

## 2017-09-19 DIAGNOSIS — F41.1 GAD (GENERALIZED ANXIETY DISORDER): Primary | ICD-10-CM

## 2017-09-19 PROCEDURE — 90832 PSYTX W PT 30 MINUTES: CPT | Performed by: MARRIAGE & FAMILY THERAPIST

## 2017-09-19 NOTE — PROGRESS NOTES
[facility]  September 19, 2017      Behavioral Health Clinician Progress Note    Patient Name: Gay Cummins           Service Type:  Individual      Service Location:   Face to Face in Clinic     Session Start Time: 2:30  Session End Time: 3:00      Session Length: 16 - 37      Attendees: Client    Visit Activities (Refresh list every visit): [facility] Only    Diagnostic Assessment Date: 9/12/17  Treatment Plan Review Date: 12/191/7  See Flowsheets for today's PHQ-9 and SANDEEP-7 results  Previous PHQ-9:   PHQ-9 SCORE 9/12/2017 9/14/2017   Total Score 16 8     Previous SANDEEP-7:   SANDEEP-7 SCORE 9/12/2017   Total Score 13       ISSA LEVEL:  ISAS Score (Last Two) 9/12/2017   ISSA Raw Score 39   Activation Score 90.2   ISSA Level 4       DATA  Extended Session (60+ minutes): No  Interactive Complexity: No  Crisis: No  [facility] Patient: No    Treatment Objective(s) Addressed in This Session:  Target Behavior(s): disease management/lifestyle changes new baby    Depressed Mood: Increase interest, engagement, and pleasure in doing things  Decrease frequency and intensity of feeling down, depressed, hopeless  Improve quantity and quality of night time sleep / decrease daytime naps  Feel less tired and more energy during the day   Improve diet, appetite, mindful eating, and / or meal planning  Identify negative self-talk and behaviors: challenge core beliefs, myths, and actions  Improve concentration, focus, and mindfulness in daily activities   Feel less fidgety, restless or slow in daily activities / interpersonal interactions  Decrease thoughts that you'd be better off dead or of suicide / self-harm  Anxiety: will experience a reduction in anxiety, will develop more effective coping skills to manage anxiety symptoms, will develop healthy cognitive patterns and beliefs and will increase ability to function adaptively    Current Stressors / Issues:  Pt states that things are better this week. Dicussed how hard it  is for self care with a new baby. Processed the need to let certain things go. Pt worried work schedule when she goes back. Discussed with pt how she wants that to look like.     Progress on Treatment Objective(s) / Homework:  New Objective established this session - PREPARATION (Decided to change - considering how); Intervened by negotiating a change plan and determining options / strategies for behavior change, identifying triggers, exploring social supports, and working towards setting a date to begin behavior change    Motivational Interviewing    MI Intervention: Expressed Empathy/Understanding and Permission to raise concern or advise     Change Talk Expressed by the Patient: Desire to change    Provider Response to Change Talk: E - Evoked more info from patient about behavior change      Situation        Automatic Thoughts  Cognitive Distortions      Feelings        Behavior        Questioning Thoughts                  Care Plan review completed: Yes    Medication Review:  No changes to current psychiatric medication(s)    Medication Compliance:  Yes    Changes in Health Issues:   None reported    Chemical Use Review:   Substance Use: Chemical use reviewed, no active concerns identified      Tobacco Use: No current tobacco use.      Assessment: Current Emotional / Mental Status (status of significant symptoms):  Risk status (Self / Other harm or suicidal ideation)  Patient denies a history of suicidal ideation, suicide attempts, self-injurious behavior, homicidal ideation, homicidal behavior and and other safety concerns  Patient denies current fears or concerns for personal safety.  Patient denies current or recent suicidal ideation or behaviors.  Patient denies current or recent homicidal ideation or behaviors.  Patient denies current or recent self injurious behavior or ideation.  Patient denies other safety concerns.  A safety and risk management plan has not been developed at this time, however patient  was encouraged to call [place] [phone] should there be a change in any of these risk factors.    Appearance:   Appropriate   Eye Contact:   Good   Psychomotor Behavior: Normal   Attitude:   Cooperative   Orientation:   All  Speech   Rate / Production: Normal    Volume:  Normal   Mood:    Anxious  Sad   Affect:    Appropriate   Thought Content:  Clear   Thought Form:  Coherent  Logical   Insight:    Good     Diagnoses:  296.32 (F33.1) Major Depressive Disorder, Recurrent Episode, Moderate _ and With anxious distress  300.02 (F41.1) Generalized Anxiety Disorder  Collateral Reports Completed:  Not Applicable    Plan: (Homework, other):  Patient was given information about behavioral services and encouraged to schedule a follow up appointment with the clinic [facility] in 1 week.  She was also given information about mental health symptoms and treatment options .  CD Recommendations: No indications of CD issues.  SHAHZAD Armas, [facility]      ______________________________________________________________________    Integrated Primary Care Behavioral Health Treatment Plan    Patient's Name: Gay Cummins  YOB: 1985    Date: 9/19/17    DSM-V Diagnoses: 296.32 (F33.1) Major Depressive Disorder, Recurrent Episode, Moderate _ and With anxious distress or 300.02 (F41.1) Generalized Anxiety Disorder  Psychosocial / Contextual Factors: new baby  WHODAS: not completed    Referral / Collaboration:  Referral to another professional/service is not indicated at this time..    Anticipated number of session or this episode of care: 6-8      MeasurableTreatment Goal(s) related to diagnosis / functional impairment(s)  Goal 1: Patient will increase her coping skills for anxiety    I will know I've met my goal when ;ess owrried.      Objective #A (Patient Action)    Patient will identify 2 initial signs or symptoms of anxiety.  Status: New - Date: 9/19/17     Intervention(s)  [facility] will provide educational materials on  anxiety.    Objective #B  Patient will practice deep breathing at least 3 a day.  Status: New - Date: 9/19/17     Intervention(s)  [facility] will teach emotional regulation skills. mindfullness and relxation skills.    Patient has reviewed and agreed to the above plan.      Tracy Cardoza, LMFT  September 19, 2017

## 2017-09-19 NOTE — MR AVS SNAPSHOT
After Visit Summary   9/19/2017    Gay Cummins    MRN: [id number]           Patient Information     Date Of Birth          1985        Visit Information        Provider Department      9/19/2017 2:30 PM Tracy Cardoza LMFT [facility]        Today's Diagnoses     SANDEEP (generalized anxiety disorder)    -  1    Moderate episode of recurrent major depressive disorder (H)           Follow-ups after your visit        Your next 10 appointments already scheduled     Sep 25, 2017  9:00 AM CDT   [facility]AD [facility]   [facility]    [address]  [facility] [address]   [phone]            Oct 10, 2017  1:45 PM CDT   SHORT with TED Mcclure CNP   [facility]    [address]  [address]  [facility] [address]   [phone]            Nov 03, 2017  1:15 PM CDT   New Visit with Marta Guevara NP   [facility]    [address]  [address]  [facility] [id number] [phone]              Who to contact     If you have questions or need follow up information about today's clinic visit or your schedule please contact [facility] directly at [phone].  Normal or non-critical lab and imaging results will be communicated to you by MyChart, letter or phone within 4 business days after the clinic has received the results. If you do not hear from us within 7 days, please contact the clinic through MyChart or phone. If you have a critical or abnormal lab result, we will notify you by phone as soon as possible.  Submit refill requests through [employer] or call your pharmacy and they will forward the refill request to us. Please allow 3 business days for your refill to be completed.          Additional Information About Your Visit        MyChart Information      [employer] gives you secure access to your electronic health record. If you see a primary care provider, you can also send messages to your care team and make appointments. If you have questions, please call your primary care clinic.  If you do not have a primary care provider, please call [phone] and they will assist you.        Care EveryWhere ID     This is your Care EveryWhere ID. This could be used by other organizations to access your [place] medical records  [id number]         Blood Pressure from Last 3 Encounters:   09/14/17 114/68   09/08/17 107/68   09/05/17 116/70    Weight from Last 3 Encounters:   09/14/17 79.6 kg (175 lb 6.4 oz)   09/08/17 79.7 kg (175 lb 12.8 oz)   09/05/17 80.6 kg (177 lb 11.2 oz)              Today, you had the following     No orders found for display       Primary Care Provider Office Phone # Fax #    Kayla Carlton [phone]       [facility] [address] [address]        Equal Access to Services     [facility] Hospital[facility] : Hadii aad ku hadasho Soomaali, waaxda luqadaha, qaybta kaalmada adeegyada, charu gomez . So [facility] [phone].    ATENCIÓN: Si habla español, tiene a salazar disposición servicios gratuitos de asistencia lingüística. Jing al [phone].    We comply with applicable federal civil rights laws and [place] laws. We do not discriminate on the basis of race, color, national origin, age, disability sex, sexual orientation or gender identity.            Thank you!     Thank you for choosing [facility]  for your care. Our goal is always to provide you with excellent care. Hearing back from our patients is one way we can continue to improve our services. Please take a few minutes to complete the written survey that you may receive in the mail after your visit with us. Thank you!             Your Updated Medication List - Protect others around you: Learn how to safely use,  store and throw away your medicines at www.disposemymeds.org.          This list is accurate as of: 17  6:10 PM.  Always use your most recent med list.                   Brand Name Dispense Instructions for use Diagnosis    acetaminophen 325 MG tablet    TYLENOL    100 tablet    Take 2 tablets (650 mg) by mouth every 4 hours as needed for other (surgical pain)    S/P  section       breast pump Misc     1 each    1 each as needed    Supervision of normal first pregnancy, antepartum       citalopram 10 MG tablet    celeXA    120 tablet    Take one tab PO daily x 1 wk, then take TWO tabs daily    Postpartum anxiety       FISH OIL OMEGA-3 1000 MG Caps      Take 1 capsule by mouth daily        medroxyPROGESTERone 150 MG/ML injection    DEPO-PROVERA    1 mL    Inject 1 mL (150 mg) into the muscle every 3 months    Encounter for initial prescription of injectable contraceptive       PRENATAL VIT-FE BISG-FA-DHA PO      Take 1 tablet by mouth daily        senna-docusate 8.6-50 MG per tablet    SENOKOT-S;PERICOLACE    100 tablet    Take 1-2 tablets by mouth 2 times daily    S/P  section

## 2017-10-05 ENCOUNTER — OFFICE VISIT (OUTPATIENT)
Dept: BEHAVIORAL HEALTH | Facility: CLINIC | Age: 32
End: 2017-10-05
Payer: COMMERCIAL

## 2017-10-05 DIAGNOSIS — F41.1 GAD (GENERALIZED ANXIETY DISORDER): ICD-10-CM

## 2017-10-05 DIAGNOSIS — F33.1 MODERATE EPISODE OF RECURRENT MAJOR DEPRESSIVE DISORDER (H): Primary | ICD-10-CM

## 2017-10-05 PROCEDURE — 90832 PSYTX W PT 30 MINUTES: CPT | Performed by: MARRIAGE & FAMILY THERAPIST

## 2017-10-05 NOTE — MR AVS SNAPSHOT
After Visit Summary   10/5/2017    Gay Cummins    MRN: [id number]           Patient Information     Date Of Birth          1985        Visit Information        Provider Department      10/5/2017 3:30 PM Tracy Cardoza LMFT [facility]        Today's Diagnoses     Moderate episode of recurrent major depressive disorder (H)    -  1    SANDEEP (generalized anxiety disorder)           Follow-ups after your visit        Your next 10 appointments already scheduled     Oct 10, 2017  1:45 PM CDT   SHORT with TED [facility] ([facility])    [address]  [address]  [facility] [address]   [phone]            Oct 17, 2017  2:30 PM CDT   Return Visit with SHAHZAD Pinto   [facility] ([facility])    [address] [facility] [address]  [facility] [address] [phone]              Who to contact     If you have questions or need follow up information about today's clinic visit or your schedule please contact [facility] directly at [phone].  Normal or non-critical lab and imaging results will be communicated to you by [employer], letter or phone within 4 business days after the clinic has received the results. If you do not hear from us within 7 days, please contact the clinic through [employer] or phone. If you have a critical or abnormal lab result, we will notify you by phone as soon as possible.  Submit refill requests through [employer] or call your pharmacy and they will forward the refill request to us. Please allow 3 business days for your refill to be completed.          Additional Information About Your Visit        MyChart Information     [employer] gives you secure access to your electronic health record. If you see a primary care provider, you can also send messages to your care team and make appointments. If you have questions, please call your primary  care clinic.  If you do not have a primary care provider, please call [phone] and they will assist you.        Care EveryWhere ID     This is your Care EveryWhere ID. This could be used by other organizations to access your [facility] medical records  [id number]         Blood Pressure from Last 3 Encounters:   09/14/17 114/68   09/08/17 107/68   09/05/17 116/70    Weight from Last 3 Encounters:   09/14/17 79.6 kg (175 lb 6.4 oz)   09/08/17 79.7 kg (175 lb 12.8 oz)   09/05/17 80.6 kg (177 lb 11.2 oz)              Today, you had the following     No orders found for display       Primary Care Provider Office Phone # Fax #    Kayla Carlton [phone]       [facility] [address] [facility] [address]        Equal Access to Services     [facility] : Hadii aad ku hadasho Soomaali, waaxda luqadaha, qaybta kaalmada adeegyada, waxay idiin hayaan tomi gomez . So [facility] [phone].    ATENCIÓN: Si habla español, tiene a salazar disposición servicios gratuitos de asistencia lingüística. Gladysame al [phone].    We comply with applicable federal civil rights laws and [place] laws. We do not discriminate on the basis of race, color, national origin, age, disability, sex, sexual orientation, or gender identity.            Thank you!     Thank you for choosing [facility]  for your care. Our goal is always to provide you with excellent care. Hearing back from our patients is one way we can continue to improve our services. Please take a few minutes to complete the written survey that you may receive in the mail after your visit with us. Thank you!             Your Updated Medication List - Protect others around you: Learn how to safely use, store and throw away your medicines at www.disposemymeds.org.          This list is accurate as of: 10/5/17  4:28 PM.  Always use your most recent med list.                   Brand Name Dispense Instructions for use  Diagnosis    acetaminophen 325 MG tablet    TYLENOL    100 tablet    Take 2 tablets (650 mg) by mouth every 4 hours as needed for other (surgical pain)    S/P  section       breast pump Misc     1 each    1 each as needed    Supervision of normal first pregnancy, antepartum       citalopram 10 MG tablet    celeXA    120 tablet    Take one tab PO daily x 1 wk, then take TWO tabs daily    Postpartum anxiety       FISH OIL OMEGA-3 1000 MG Caps      Take 1 capsule by mouth daily        medroxyPROGESTERone 150 MG/ML injection    DEPO-PROVERA    1 mL    Inject 1 mL (150 mg) into the muscle every 3 months    Encounter for initial prescription of injectable contraceptive       PRENATAL VIT-FE BISG-FA-DHA PO      Take 1 tablet by mouth daily        senna-docusate 8.6-50 MG per tablet    SENOKOT-S;PERICOLACE    100 tablet    Take 1-2 tablets by mouth 2 times daily    S/P  section

## 2017-10-05 NOTE — PROGRESS NOTES
[facility]  October 5, 2017      Behavioral Health Clinician Progress Note    Patient Name: Gay Cummins           Service Type:  Individual      Service Location:   Face to Face in Clinic     Session Start Time: 3;30  Session End Time: 4;00      Session Length: 16 - 37      Attendees: Client    Visit Activities (Refresh list every visit): [facility] Only    Diagnostic Assessment Date: 9/12/17  Treatment Plan Review Date: 12/191/7  See Flowsheets for today's PHQ-9 and SANDEEP-7 results  Previous PHQ-9:   PHQ-9 SCORE 9/12/2017 9/14/2017   Total Score 16 8     Previous SANDEEP-7:   SANDEEP-7 SCORE 9/12/2017   Total Score 13       ISSA LEVEL:  ISSA Score (Last Two) 9/12/2017   ISSA Raw Score 39   Activation Score 90.2   ISSA Level 4       DATA  Extended Session (60+ minutes): No  Interactive Complexity: No  Crisis: No  [facility] Patient: No    Treatment Objective(s) Addressed in This Session:  Target Behavior(s): disease management/lifestyle changes new baby    Depressed Mood: Increase interest, engagement, and pleasure in doing things  Decrease frequency and intensity of feeling down, depressed, hopeless  Improve quantity and quality of night time sleep / decrease daytime naps  Feel less tired and more energy during the day   Improve diet, appetite, mindful eating, and / or meal planning  Identify negative self-talk and behaviors: challenge core beliefs, myths, and actions  Improve concentration, focus, and mindfulness in daily activities   Feel less fidgety, restless or slow in daily activities / interpersonal interactions  Decrease thoughts that you'd be better off dead or of suicide / self-harm  Anxiety: will experience a reduction in anxiety, will develop more effective coping skills to manage anxiety symptoms, will develop healthy cognitive patterns and beliefs and will increase ability to function adaptively    Current Stressors / Issues:  Pt states that things are better this week. Worked on how to use 5  senses and visualation to help with relaxation.   Progress on Treatment Objective(s) / Homework:  New Objective established this session - PREPARATION (Decided to change - considering how); Intervened by negotiating a change plan and determining options / strategies for behavior change, identifying triggers, exploring social supports, and working towards setting a date to begin behavior change    Motivational Interviewing    MI Intervention: Expressed Empathy/Understanding and Permission to raise concern or advise     Change Talk Expressed by the Patient: Desire to change    Provider Response to Change Talk: E - Evoked more info from patient about behavior change      Situation        Automatic Thoughts  Cognitive Distortions      Feelings        Behavior        Questioning Thoughts                  Care Plan review completed: Yes    Medication Review:  No changes to current psychiatric medication(s)    Medication Compliance:  Yes    Changes in Health Issues:   None reported    Chemical Use Review:   Substance Use: Chemical use reviewed, no active concerns identified      Tobacco Use: No current tobacco use.      Assessment: Current Emotional / Mental Status (status of significant symptoms):  Risk status (Self / Other harm or suicidal ideation)  Patient denies a history of suicidal ideation, suicide attempts, self-injurious behavior, homicidal ideation, homicidal behavior and and other safety concerns  Patient denies current fears or concerns for personal safety.  Patient denies current or recent suicidal ideation or behaviors.  Patient denies current or recent homicidal ideation or behaviors.  Patient denies current or recent self injurious behavior or ideation.  Patient denies other safety concerns.  A safety and risk management plan has not been developed at this time, however patient was encouraged to call [facility] should there be a change in any of these risk factors.    Appearance:   Appropriate    Eye Contact:   Good   Psychomotor Behavior: Normal   Attitude:   Cooperative   Orientation:   All  Speech   Rate / Production: Normal    Volume:  Normal   Mood:    Anxious  Sad   Affect:    Appropriate   Thought Content:  Clear   Thought Form:  Coherent  Logical   Insight:    Good     Diagnoses:  296.32 (F33.1) Major Depressive Disorder, Recurrent Episode, Moderate _ and With anxious distress  300.02 (F41.1) Generalized Anxiety Disorder  Collateral Reports Completed:  Not Applicable    Plan: (Homework, other):  Patient was given information about behavioral services and encouraged to schedule a follow up appointment with the clinic [facility] in 1 week.  She was also given information about mental health symptoms and treatment options .  CD Recommendations: No indications of CD issues.  SHAHZAD Armas, [facility]      ______________________________________________________________________    Integrated Primary Care Behavioral Health Treatment Plan    Patient's Name: Gay Cummins  YOB: 1985    Date: 9/19/17    DSM-V Diagnoses: 296.32 (F33.1) Major Depressive Disorder, Recurrent Episode, Moderate _ and With anxious distress or 300.02 (F41.1) Generalized Anxiety Disorder  Psychosocial / Contextual Factors: new baby  WHODAS: not completed    Referral / Collaboration:  Referral to another professional/service is not indicated at this time..    Anticipated number of session or this episode of care: 6-8      MeasurableTreatment Goal(s) related to diagnosis / functional impairment(s)  Goal 1: Patient will increase her coping skills for anxiety    I will know I've met my goal when ;ess owrried.      Objective #A (Patient Action)    Patient will identify 2 initial signs or symptoms of anxiety.  Status: New - Date: 9/19/17     Intervention(s)  [facility] will provide educational materials on anxiety.    Objective #B  Patient will practice deep breathing at least 3 a day.  Status: New - Date: 9/19/17     Intervention(s)  [facility]  will teach emotional regulation skills. mindfullness and relxation skills.    Patient has reviewed and agreed to the above plan.      Tracy Cardoza, LMFT  September 19, 2017

## 2017-10-10 ENCOUNTER — OFFICE VISIT (OUTPATIENT)
Dept: PEDIATRICS | Facility: CLINIC | Age: 32
End: 2017-10-10
Payer: COMMERCIAL

## 2017-10-10 VITALS
DIASTOLIC BLOOD PRESSURE: 63 MMHG | BODY MASS INDEX: 27.44 KG/M2 | SYSTOLIC BLOOD PRESSURE: 113 MMHG | TEMPERATURE: 98 F | HEART RATE: 68 BPM | WEIGHT: 175.2 LBS

## 2017-10-10 DIAGNOSIS — F33.1 MODERATE EPISODE OF RECURRENT MAJOR DEPRESSIVE DISORDER (H): ICD-10-CM

## 2017-10-10 DIAGNOSIS — F41.1 GAD (GENERALIZED ANXIETY DISORDER): Primary | ICD-10-CM

## 2017-10-10 DIAGNOSIS — Z23 NEED FOR PROPHYLACTIC VACCINATION AND INOCULATION AGAINST INFLUENZA: ICD-10-CM

## 2017-10-10 PROCEDURE — 99213 OFFICE O/P EST LOW 20 MIN: CPT | Mod: 25 | Performed by: NURSE PRACTITIONER

## 2017-10-10 PROCEDURE — 90471 IMMUNIZATION ADMIN: CPT | Performed by: NURSE PRACTITIONER

## 2017-10-10 PROCEDURE — 90686 IIV4 VACC NO PRSV 0.5 ML IM: CPT | Performed by: NURSE PRACTITIONER

## 2017-10-10 RX ORDER — CITALOPRAM HYDROBROMIDE 20 MG/1
20 TABLET ORAL DAILY
Qty: 90 TABLET | Refills: 1 | Status: SHIPPED | OUTPATIENT
Start: 2017-10-10 | End: 2018-05-05

## 2017-10-10 RX ORDER — CITALOPRAM HYDROBROMIDE 20 MG/1
20 TABLET ORAL DAILY
Qty: 90 TABLET | Refills: 1 | Status: SHIPPED | OUTPATIENT
Start: 2017-10-10 | End: 2017-10-10

## 2017-10-10 ASSESSMENT — ANXIETY QUESTIONNAIRES
5. BEING SO RESTLESS THAT IT IS HARD TO SIT STILL: NOT AT ALL
2. NOT BEING ABLE TO STOP OR CONTROL WORRYING: NOT AT ALL
6. BECOMING EASILY ANNOYED OR IRRITABLE: NOT AT ALL
IF YOU CHECKED OFF ANY PROBLEMS ON THIS QUESTIONNAIRE, HOW DIFFICULT HAVE THESE PROBLEMS MADE IT FOR YOU TO DO YOUR WORK, TAKE CARE OF THINGS AT HOME, OR GET ALONG WITH OTHER PEOPLE: NOT DIFFICULT AT ALL
3. WORRYING TOO MUCH ABOUT DIFFERENT THINGS: NOT AT ALL
1. FEELING NERVOUS, ANXIOUS, OR ON EDGE: NOT AT ALL
GAD7 TOTAL SCORE: 0
7. FEELING AFRAID AS IF SOMETHING AWFUL MIGHT HAPPEN: NOT AT ALL

## 2017-10-10 ASSESSMENT — PATIENT HEALTH QUESTIONNAIRE - PHQ9
5. POOR APPETITE OR OVEREATING: NOT AT ALL
SUM OF ALL RESPONSES TO PHQ QUESTIONS 1-9: 2

## 2017-10-10 NOTE — MR AVS SNAPSHOT
After Visit Summary   10/10/2017    Gay Cummins    MRN: [id number]           Patient Information     Date Of Birth          1985        Visit Information        Provider Department      10/10/2017 1:45 PM Lakshmi Nathan [facility]N C[facility]        Today's Diagnoses     SANDEEP (generalized anxiety disorder)    -  1    Moderate episode of recurrent major depressive disorder (H)        Need for prophylactic vaccination and inoculation against influenza           Follow-ups after your visit        Additional Services     MENTAL HEALTH REFERRAL       Your provider has referred you to: FMG: [facility] - Counseling (Individual/Couples/Family) - [facility] [phone]   *Patient will be contacted by [facility] scheduling partner, [facility]), to schedule an appointment.  Patients may also call [facility] to schedule.    All scheduling is subject to the client's specific insurance plan & benefits, provider/location availability, and provider clinical specialities.  Please arrive 15 minutes early for your first appointment and bring your completed paperwork.    Please be aware that coverage of these services is subject to the terms and limitations of your health insurance plan.  Call member services at your health plan with any benefit or coverage questions.                  Your next 10 appointments already scheduled     Oct 17, 2017  2:30 PM CDT   Return Visit with SHAHZAD Pinto   [facility]    [address]  [facility] [address] [phone]              Who to contact     If you have questions or need follow up information about today's clinic visit or your schedule please contact [facility] directly at [phone].  Normal or non-critical lab and imaging results will be communicated to you by MyChart, letter or phone within 4 business days  after the clinic has received the results. If you do not hear from us within 7 days, please contact the clinic through [employer] or phone. If you have a critical or abnormal lab result, we will notify you by phone as soon as possible.  Submit refill requests through [employer] or call your pharmacy and they will forward the refill request to us. Please allow 3 business days for your refill to be completed.          Additional Information About Your Visit        [employer] Information     [employer] gives you secure access to your electronic health record. If you see a primary care provider, you can also send messages to your care team and make appointments. If you have questions, please call your primary care clinic.  If you do not have a primary care provider, please call [phone] and they will assist you.        Care EveryWhere ID     This is your Care EveryWhere ID. This could be used by other organizations to access your [facility] medical records  [id number]        Your Vitals Were     Pulse Temperature BMI (Body Mass Index)             68 98  F (36.7  C) (Oral) 27.44 kg/m2          Blood Pressure from Last 3 Encounters:   10/10/17 113/63   09/14/17 114/68   09/08/17 107/68    Weight from Last 3 Encounters:   10/10/17 175 lb 3.2 oz (79.5 kg)   09/14/17 175 lb 6.4 oz (79.6 kg)   09/08/17 175 lb 12.8 oz (79.7 kg)              We Performed the Following     FLU VAC, SPLIT VIRUS IM > 3 YO (QUADRIVALENT) [74935]     MENTAL HEALTH REFERRAL     Vaccine Administration, Initial [76463]        Primary Care Provider Office Phone # Fax #    Kayla Carlton [phone]       [facility] [address] [place] [address]        Equal Access to Services     [facility] JEISON : Hadii teodora Smith, waaxda luqadaha, qaybta kaalmada deven, charu rodriguez. So [facility] [phone].    ATENCIÓN: Si habla español, tiene a salazar disposición servicios gratuitos de  asistencia lingüística. Jing al [phone].    We comply with applicable federal civil rights laws and [place] laws. We do not discriminate on the basis of race, color, national origin, age, disability, sex, sexual orientation, or gender identity.            Thank you!     Thank you for choosing [facility]  for your care. Our goal is always to provide you with excellent care. Hearing back from our patients is one way we can continue to improve our services. Please take a few minutes to complete the written survey that you may receive in the mail after your visit with us. Thank you!             Your Updated Medication List - Protect others around you: Learn how to safely use, store and throw away your medicines at www.disposemymeds.org.          This list is accurate as of: 10/10/17  2:09 PM.  Always use your most recent med list.                   Brand Name Dispense Instructions for use Diagnosis    breast pump Misc     1 each    1 each as needed    Supervision of normal first pregnancy, antepartum       citalopram 10 MG tablet    celeXA    120 tablet    Take one tab PO daily x 1 wk, then take TWO tabs daily    Postpartum anxiety       FISH OIL OMEGA-3 1000 MG Caps      Take 1 capsule by mouth daily        medroxyPROGESTERone 150 MG/ML injection    DEPO-PROVERA    1 mL    Inject 1 mL (150 mg) into the muscle every 3 months    Encounter for initial prescription of injectable contraceptive       PRENATAL VIT-FE BISG-FA-DHA PO      Take 1 tablet by mouth daily

## 2017-10-10 NOTE — PROGRESS NOTES
SUBJECTIVE:   Gay Cummins is a 32 year old female who presents to clinic today for the following health issues:    Depression and Anxiety Follow-Up    Status since last visit: Improved     Other associated symptoms:None    Complicating factors:     Significant life event: No     Current substance abuse: None    PHQ-9 Score and MyChart F/U Questions 2017   Total Score 16 8   Q9: Suicide Ideation Not at all Not at all     SANDEEP-7 SCORE 2017   Total Score 13     In the past two weeks have you had thoughts of suicide or self-harm?  No.    Do you have concerns about your personal safety or the safety of others?   No    PHQ-9  English  PHQ-9   Any Language  GAD7  Suicide Assessment Five-step Evaluation and Treatment (SAFE-T)      Amount of exercise or physical activity: walks when she can    Problems taking medications regularly: No    Medication side effects: none  Diet: regular (no restrictions)    At last OV 1 mo ago, I saw her for lactation and she brought up symptoms of anxiety that had increased significantly. We started her on celexa, and since then, she notes a marked improvement of her symptoms. She denies SE. She has also seen a counselor but doesn't feel she is connecting with her and wondering about how to see someone new.     SANDEEP-7 SCORE 2017   Total Score 13       Problem list and histories reviewed & adjusted, as indicated.  Additional history: as documented    Patient Active Problem List   Diagnosis     Sprain of symphysis pubis     Sacroiliac joint pain     Postpartum anxiety     Moderate episode of recurrent major depressive disorder (H)     SANDEEP (generalized anxiety disorder)     Past Surgical History:   Procedure Laterality Date     ADENOIDECTOMY  2009      SECTION N/A 2017    Procedure:  SECTION;  priamry  section ;  Surgeon: Chela Maria DO;  Location: [facility] OR     CHOLECYSTECTOMY        TOOTH EXTRACTION W/FORCEP         Social  History   Substance Use Topics     Smoking status: Never Smoker     Smokeless tobacco: Never Used     Alcohol use No      Comment: not during pregnancy     Family History   Problem Relation Age of Onset     HEART DISEASE Paternal Grandfather      Breast Cancer Paternal Grandmother      Type 2 Diabetes Maternal Grandfather      Lung Cancer Maternal Grandfather      smoker     Thyroid Disease Maternal Grandmother      CEREBROVASCULAR DISEASE Maternal Grandmother 75     Rheumatoid Arthritis Mother 50     Family History Negative Father      Family History Negative Sister              Reviewed and updated as needed this visit by clinical staff     Reviewed and updated as needed this visit by Provider         ROS:  Constitutional, HEENT, cardiovascular, pulmonary, gi and gu systems are negative, except as otherwise noted.      OBJECTIVE:   /63  Pulse 68  Temp 98  F (36.7  C) (Oral)  Wt 175 lb 3.2 oz (79.5 kg)  BMI 27.44 kg/m2  Body mass index is 27.44 kg/(m^2).  GENERAL: healthy, alert and no distress  PSYCH: mentation appears normal, affect normal/bright      ASSESSMENT/PLAN:     1. Moderate episode of recurrent major depressive disorder (H)  Improved sine starting ceelxa.   - MENTAL HEALTH REFERRAL  - citalopram (CELEXA) 20 MG tablet; Take 1 tablet (20 mg) by mouth daily  Dispense: 90 tablet; Refill: 1    2. SANDEEP (generalized anxiety disorder)  Improved, ok to continue meds, find a new therapist  - MENTAL HEALTH REFERRAL  - citalopram (CELEXA) 20 MG tablet; Take 1 tablet (20 mg) by mouth daily  Dispense: 90 tablet; Refill: 1    3. Need for prophylactic vaccination and inoculation against influenza    - FLU VAC, SPLIT VIRUS IM > 3 YO (QUADRIVALENT) [11094]  - Vaccine Administration, Initial [55181]    There are no Patient Instructions on file for this visit.  Total time spent with patient 15 minutes, >50% time spent on counseling and coordination of care and education on the above issues.      Lakshmi MCINTOSH  [facility]  Injectable Influenza Immunization Documentation    1.  Is the person to be vaccinated sick today?   No    2. Does the person to be vaccinated have an allergy to a component   of the vaccine?   No    3. Has the person to be vaccinated ever had a serious reaction   to influenza vaccine in the past?   No    4. Has the person to be vaccinated ever had Guillain-Barré syndrome?   No    Form completed by self

## 2017-10-10 NOTE — NURSING NOTE
"Chief Complaint   Patient presents with     Anxiety     Depression     Flu Shot       Initial /63  Pulse 68  Temp 98  F (36.7  C) (Oral)  Wt 175 lb 3.2 oz (79.5 kg)  BMI 27.44 kg/m2 Estimated body mass index is 27.44 kg/(m^2) as calculated from the following:    Height as of 9/14/17: 5' 7\" (1.702 m).    Weight as of this encounter: 175 lb 3.2 oz (79.5 kg).  Medication Reconciliation: complete  "

## 2017-10-11 ASSESSMENT — ANXIETY QUESTIONNAIRES: GAD7 TOTAL SCORE: 0

## 2017-10-17 PROBLEM — Z91.199 NO-SHOW FOR APPOINTMENT: Status: ACTIVE | Noted: 2017-10-17

## 2017-11-15 NOTE — TELEPHONE ENCOUNTER
Chief complaint:   No chief complaint on file.      Vitals:  Visit Vitals  Wt (!) 21.8 kg       HISTORY OF PRESENT ILLNESS     URI   This is a new problem. The current episode started in the past 7 days. The problem occurs daily. The problem has been gradually improving. Associated symptoms include congestion and coughing. Pertinent negatives include no abdominal pain, anorexia, fever, sore throat or vomiting.       Other significant problems:  There are no active problems to display for this patient.      PAST MEDICAL, FAMILY AND SOCIAL HISTORY     Medications:  No current outpatient prescriptions on file.     No current facility-administered medications for this visit.        Allergies:  ALLERGIES:  No Known Allergies    Past Medical  History/Surgeries:  No past medical history on file.    No past surgical history on file.    Family History:  No family history on file.    Social History:  Social History   Substance Use Topics   • Smoking status: Not on file   • Smokeless tobacco: Not on file      Comment: no smokers at home   • Alcohol use Not on file       REVIEW OF SYSTEMS     Review of Systems   Constitutional: Negative for activity change, appetite change and fever.   HENT: Positive for congestion. Negative for ear pain and sore throat.    Respiratory: Positive for cough.    Gastrointestinal: Negative for abdominal pain, anorexia and vomiting.       PHYSICAL EXAM     Physical Exam   Constitutional: No distress.   HENT:   Right Ear: Tympanic membrane normal.   Left Ear: Tympanic membrane normal.   Mouth/Throat: No tonsillar exudate. Oropharynx is clear. Pharynx is normal.   Eyes: Conjunctivae are normal.   Cardiovascular: Regular rhythm.    No murmur heard.  Pulmonary/Chest: Effort normal. No respiratory distress. He has no wheezes. He exhibits no retraction.   Neurological: He is alert.   Skin: Skin is warm. No rash noted.       ASSESSMENT/PLAN     DX: URI  Plan: supportive care  Needs physical and  Surgeon:Kayla Carlton MD   Assist:  No   Location: [facility]   Date/time preference:  17     Surgery:  Primary  section   Length of Surgery:  1 hour   Diagnosis:  Elective primary  section   Anesthesia type:  SPINAL     Special instructions / equipment:  none   Am admit or same day: AM ADMIT   Bowel prep: No   Pre op: PCP   Office visit with surgeon prior to surgery: Yes, routine prenatal care     immunizations, mom will reschedule in 1 week

## 2017-12-07 ENCOUNTER — ALLIED HEALTH/NURSE VISIT (OUTPATIENT)
Dept: NURSING | Facility: CLINIC | Age: 32
End: 2017-12-07
Payer: COMMERCIAL

## 2017-12-07 PROCEDURE — 96372 THER/PROPH/DIAG INJ SC/IM: CPT

## 2017-12-07 PROCEDURE — 99207 ZZC NO CHARGE NURSE ONLY: CPT

## 2017-12-14 ENCOUNTER — TRANSFERRED RECORDS (OUTPATIENT)
Dept: HEALTH INFORMATION MANAGEMENT | Facility: CLINIC | Age: 32
End: 2017-12-14

## 2018-03-01 ENCOUNTER — TELEPHONE (OUTPATIENT)
Dept: OBGYN | Facility: CLINIC | Age: 33
End: 2018-03-01

## 2018-03-01 NOTE — TELEPHONE ENCOUNTER
I contacted patient to let her know she needs to come in for her depo provera injection by 3/02/2018. Her last injection was given on 12/01/2017.   Dov Trinidad, CMA

## 2018-03-16 ENCOUNTER — OFFICE VISIT (OUTPATIENT)
Dept: OBGYN | Facility: CLINIC | Age: 33
End: 2018-03-16
Payer: COMMERCIAL

## 2018-03-16 VITALS
WEIGHT: 176.6 LBS | DIASTOLIC BLOOD PRESSURE: 68 MMHG | SYSTOLIC BLOOD PRESSURE: 108 MMHG | HEIGHT: 67 IN | BODY MASS INDEX: 27.72 KG/M2

## 2018-03-16 DIAGNOSIS — Z30.016 ENCOUNTER FOR INITIAL PRESCRIPTION OF TRANSDERMAL PATCH HORMONAL CONTRACEPTIVE DEVICE: Primary | ICD-10-CM

## 2018-03-16 PROCEDURE — 99213 OFFICE O/P EST LOW 20 MIN: CPT | Performed by: ADVANCED PRACTICE MIDWIFE

## 2018-03-16 RX ORDER — NORELGESTROMIN AND ETHINYL ESTRADIOL 35; 150 UG/MG; UG/MG
PATCH TRANSDERMAL
Qty: 3 PATCH | Refills: 11 | Status: SHIPPED | OUTPATIENT
Start: 2018-03-16 | End: 2018-10-08

## 2018-03-16 NOTE — PROGRESS NOTES
SUBJECTIVE:   Gay Cumimns is a 32 year old who presents to the clinic for discussion of birth control methods.   She has used the following methods in the past: Depo Provera  Today she is interested in discussing ANDREW  Gay presents to switch contraceptive methods. She was using depo, missed her last shot. She desires another pregnancy within the year. Advised a less long term method. Denies ACHES. Still within two week depo window. No unprotected intercourse.   Histories reviewed and updated  Past Medical History:   Diagnosis Date     Anxiety     in High school     Depression     in High school     Past Surgical History:   Procedure Laterality Date     ADENOIDECTOMY  2009      SECTION N/A 2017    Procedure:  SECTION;  priamry  section ;  Surgeon: Chela Maria DO;  Location: [facility] OR     CHOLECYSTECTOMY        TOOTH EXTRACTION W/FORCEP       Social History     Social History     Marital status:      Spouse name: N/A     Number of children: N/A     Years of education: N/A     Occupational History     Not on file.     Social History Main Topics     Smoking status: Never Smoker     Smokeless tobacco: Never Used     Alcohol use No      Comment: not during pregnancy     Drug use: No     Sexual activity: Yes     Partners: Male     Other Topics Concern      Service No     Blood Transfusions No     Caffeine Concern Yes     Occupational Exposure Yes     Hobby Hazards No     Sleep Concern Yes     Stress Concern No     Weight Concern No     Special Diet No     Back Care No     Exercise No     Bike Helmet Yes     Seat Belt Yes     Self-Exams No     Social History Narrative     Family History   Problem Relation Age of Onset     HEART DISEASE Paternal Grandfather      Breast Cancer Paternal Grandmother      Type 2 Diabetes Maternal Grandfather      Lung Cancer Maternal Grandfather      smoker     Thyroid Disease Maternal Grandmother      CEREBROVASCULAR DISEASE  "Maternal Grandmother 75     Rheumatoid Arthritis Mother 50     Family History Negative Father      Family History Negative Sister        Menstrual History:  Amenorrhea since delivery, breastfeeding, and depo.    Denies the following contraindications to estrogen/progesterone combined contraception:  Migraine with aura  Smoking over age 35  Liver disease  Personal history of blood clot or stroke   History of heart disease  History of breast cancer  Undiagnosed vaginal bleeding  Hypertension  Pregnancy    Health maintenance updated:  yes    ROS:   12 point review of systems negative other than symptoms noted below.    EXAM:  /68  Ht 5' 7\" (1.702 m)  Wt 176 lb 9.6 oz (80.1 kg)  LMP  (LMP Unknown)  Breastfeeding? No  BMI 27.66 kg/m2  Body mass index is 27.66 kg/(m^2).   Physical Exam   Constitutional: She is oriented to person, place, and time and well-developed, well-nourished, and in no distress.   HENT:   Head: Normocephalic.   Neck: Normal range of motion. No thyromegaly present.   Cardiovascular: Normal rate, regular rhythm and normal heart sounds.    Pulmonary/Chest: Effort normal and breath sounds normal.   Musculoskeletal: Normal range of motion.   Neurological: She is alert and oriented to person, place, and time.   Skin: Skin is warm and dry.       ASSESSMENT/PLAN:    ICD-10-CM    1. Encounter for initial prescription of transdermal patch hormonal contraceptive device Z30.016 norelgestromin-ethinyl estradiol (ORTHO EVRA) 150-35 MCG/24HR patch     There are no contraindications to the use of Patch    COUNSELING:  Reviewed risks and benefits of contraceptive use  Ortho Evra patch. Patient reports being a bad pill taker, so I recommended either the patch or the ring. After discussion, patient desires to try Ortho Evra Patch. Educated on use/MOA/SE/risk. Rx: Ortho Evra Patch x1 year sent to pharmacy.   Discussed proper use of chosen method  Handouts/Instrucions provided    Estrella Vargas CNM    "

## 2018-03-16 NOTE — MR AVS SNAPSHOT
"              After Visit Summary   3/16/2018    Gay Cummins    MRN: [id number]           Patient Information     Date Of Birth          1985        Visit Information        Provider Department      3/16/2018 9:30 AM Estrella Vargas APRN CNM [facility]        Today's Diagnoses     Encounter for initial prescription of transdermal patch hormonal contraceptive device    -  1       Follow-ups after your visit        Follow-up notes from your care team     Return in about 1 year (around 3/16/2019) for Routine Visit.      Who to contact     If you have questions or need follow up information about today's clinic visit or your schedule please contact [facility] directly at [phone].  Normal or non-critical lab and imaging results will be communicated to you by MyChart, letter or phone within 4 business days after the clinic has received the results. If you do not hear from us within 7 days, please contact the clinic through RadiumOne[employer] or phone. If you have a critical or abnormal lab result, we will notify you by phone as soon as possible.  Submit refill requests through [employer] or call your pharmacy and they will forward the refill request to us. Please allow 3 business days for your refill to be completed.          Additional Information About Your Visit        MyChart Information     [employer] gives you secure access to your electronic health record. If you see a primary care provider, you can also send messages to your care team and make appointments. If you have questions, please call your primary care clinic.  If you do not have a primary care provider, please call [phone] and they will assist you.        Care EveryWhere ID     This is your Care EveryWhere ID. This could be used by other organizations to access your [place] medical records  [id number]        Your Vitals Were     Height Last Period Breastfeeding? BMI (Body Mass Index)          5' 7\" (1.702 m) " (LMP Unknown) No 27.66 kg/m2         Blood Pressure from Last 3 Encounters:   03/16/18 108/68   10/10/17 113/63   09/14/17 114/68    Weight from Last 3 Encounters:   03/16/18 176 lb 9.6 oz (80.1 kg)   10/10/17 175 lb 3.2 oz (79.5 kg)   09/14/17 175 lb 6.4 oz (79.6 kg)              Today, you had the following     No orders found for display         Today's Medication Changes          These changes are accurate as of 3/16/18  9:55 AM.  If you have any questions, ask your nurse or doctor.               Start taking these medicines.        Dose/Directions    norelgestromin-ethinyl estradiol 150-35 MCG/24HR patch   Commonly known as:  ORTHO EVRA   Used for:  Encounter for initial prescription of transdermal patch hormonal contraceptive device   Started by:  Estrella Vargas APRN CNM        Remove old patch and apply new patch onto the skin once a week for 3 weeks (21 days). Do not wear patch week 4 (days 22-28), then repeat.   Quantity:  3 patch   Refills:  11         These medicines have changed or have updated prescriptions.        Dose/Directions    citalopram 20 MG tablet   Commonly known as:  celeXA   This may have changed:  Another medication with the same name was removed. Continue taking this medication, and follow the directions you see here.   Used for:  Moderate episode of recurrent major depressive disorder (H), SANDEEP (generalized anxiety disorder)   Changed by:  Estrella Vargas APRN CNM        Dose:  20 mg   Take 1 tablet (20 mg) by mouth daily   Quantity:  90 tablet   Refills:  1            Where to get your medicines      These medications were sent to [facility]/pharmacy #[id number] - [place], [place] - [address] [facility]  [address] [address] [facility] [address]     Phone:  [phone]     norelgestromin-ethinyl estradiol 150-35 MCG/24HR patch                Primary Care Provider Office Phone # Fax #    Kayla Carlton [phone]       [address]  [facility] [address]        Equal Access to Services     [facility] JEISON : Hadii teodora griggs sheelajessie Smith, wasocratesda luqadaha, qaybta dianesilvanamarcia carvalho, charu rodriguez. So [facility] [phone].    ATENCIÓN: Si habla español, tiene a salazar disposición servicios gratuitos de asistencia lingüística. Gladysame al [phone].    We comply with applicable federal civil rights laws and [place] laws. We do not discriminate on the basis of race, color, national origin, age, disability, sex, sexual orientation, or gender identity.            Thank you!     Thank you for choosing [facility]  for your care. Our goal is always to provide you with excellent care. Hearing back from our patients is one way we can continue to improve our services. Please take a few minutes to complete the written survey that you may receive in the mail after your visit with us. Thank you!             Your Updated Medication List - Protect others around you: Learn how to safely use, store and throw away your medicines at www.disposemymeds.org.          This list is accurate as of 3/16/18  9:55 AM.  Always use your most recent med list.                   Brand Name Dispense Instructions for use Diagnosis    breast pump Misc     1 each    1 each as needed    Supervision of normal first pregnancy, antepartum       citalopram 20 MG tablet    celeXA    90 tablet    Take 1 tablet (20 mg) by mouth daily    Moderate episode of recurrent major depressive disorder (H), SANDEEP (generalized anxiety disorder)       FISH OIL OMEGA-3 1000 MG Caps      Take 1 capsule by mouth daily        medroxyPROGESTERone 150 MG/ML injection    DEPO-PROVERA    1 mL    Inject 1 mL (150 mg) into the muscle every 3 months    Encounter for initial prescription of injectable contraceptive       norelgestromin-ethinyl estradiol 150-35 MCG/24HR patch    ORTHO EVRA    3 patch    Remove old patch and apply new patch onto the skin once a week for 3 weeks  (21 days). Do not wear patch week 4 (days 22-28), then repeat.    Encounter for initial prescription of transdermal patch hormonal contraceptive device       PRENATAL VIT-FE BISG-FA-DHA PO      Take 1 tablet by mouth daily

## 2018-03-16 NOTE — NURSING NOTE
"Chief Complaint   Patient presents with     Contraception     discuss options     Discuss options, want to try for another baby in the future but not ready at this time.  .  Initial /68  Ht 5' 7\" (1.702 m)  Wt 176 lb 9.6 oz (80.1 kg)  LMP  (LMP Unknown)  Breastfeeding? No  BMI 27.66 kg/m2 Estimated body mass index is 27.66 kg/(m^2) as calculated from the following:    Height as of this encounter: 5' 7\" (1.702 m).    Weight as of this encounter: 176 lb 9.6 oz (80.1 kg).  Medication Reconciliation: complete     Ashely Marrero, JAMMIE      "

## 2018-05-05 DIAGNOSIS — F41.1 GAD (GENERALIZED ANXIETY DISORDER): ICD-10-CM

## 2018-05-05 DIAGNOSIS — F33.1 MODERATE EPISODE OF RECURRENT MAJOR DEPRESSIVE DISORDER (H): ICD-10-CM

## 2018-05-07 NOTE — TELEPHONE ENCOUNTER
"Requested Prescriptions   Pending Prescriptions Disp Refills     citalopram (CELEXA) 20 MG tablet [Pharmacy Med Name: CITALOPRAM HBR 20 MG TABLET]    Last Written Prescription Date:  10/10/2017  Last Fill Quantity: 90,  # refills: 1   Last office visit: 10/10/2017 with prescribing provider:  Kayla Carlton     Future Office Visit:     90 tablet 1     Sig: TAKE 1 TABLET (20 MG) BY MOUTH DAILY    SSRIs Protocol Failed    5/5/2018  2:12 AM       Failed - PHQ-9 score less than 5 in past 6 months    Please review last PHQ-9 score.     PHQ-9 SCORE 9/12/2017 9/14/2017 10/10/2017   Total Score 16 8 2     SANDEEP-7 SCORE 9/12/2017 10/10/2017   Total Score 13 0                Failed - Recent (6 mo) or future (30 days) visit within the authorizing provider's specialty    Patient had office visit in the last 6 months or has a visit in the next 30 days with authorizing provider or within the authorizing provider's specialty.  See \"Patient Info\" tab in inbasket, or \"Choose Columns\" in Meds & Orders section of the refill encounter.           Passed - Patient is age 18 or older       Passed - No active pregnancy on record       Passed - No positive pregnancy test in last 12 months          "

## 2018-05-10 RX ORDER — CITALOPRAM HYDROBROMIDE 20 MG/1
TABLET ORAL
Qty: 30 TABLET | Refills: 0 | Status: SHIPPED | OUTPATIENT
Start: 2018-05-10 | End: 2018-10-08

## 2018-05-10 NOTE — TELEPHONE ENCOUNTER
Medication is being filled for 1 time refill only due to:  Patient needs to be seen because Overdue for depression follow up .     Debora Deluna RN -- [facility]

## 2018-10-08 ENCOUNTER — OFFICE VISIT (OUTPATIENT)
Dept: PEDIATRICS | Facility: CLINIC | Age: 33
End: 2018-10-08
Payer: COMMERCIAL

## 2018-10-08 VITALS — WEIGHT: 182.5 LBS | TEMPERATURE: 97.2 F | BODY MASS INDEX: 28.58 KG/M2

## 2018-10-08 DIAGNOSIS — R42 DIZZINESS: ICD-10-CM

## 2018-10-08 DIAGNOSIS — Z30.011 ENCOUNTER FOR INITIAL PRESCRIPTION OF CONTRACEPTIVE PILLS: ICD-10-CM

## 2018-10-08 DIAGNOSIS — F41.1 GAD (GENERALIZED ANXIETY DISORDER): ICD-10-CM

## 2018-10-08 DIAGNOSIS — F33.1 MODERATE EPISODE OF RECURRENT MAJOR DEPRESSIVE DISORDER (H): ICD-10-CM

## 2018-10-08 DIAGNOSIS — K64.3: ICD-10-CM

## 2018-10-08 DIAGNOSIS — Z00.00 ROUTINE GENERAL MEDICAL EXAMINATION AT A HEALTH CARE FACILITY: Primary | ICD-10-CM

## 2018-10-08 DIAGNOSIS — Z12.4 SCREENING FOR MALIGNANT NEOPLASM OF CERVIX: ICD-10-CM

## 2018-10-08 PROBLEM — Z91.199 NO-SHOW FOR APPOINTMENT: Status: RESOLVED | Noted: 2017-10-17 | Resolved: 2018-10-08

## 2018-10-08 PROBLEM — F41.8 POSTPARTUM ANXIETY: Status: RESOLVED | Noted: 2017-09-08 | Resolved: 2018-10-08

## 2018-10-08 PROBLEM — S33.8XXA: Status: RESOLVED | Noted: 2017-07-13 | Resolved: 2018-10-08

## 2018-10-08 LAB
DEPRECATED CALCIDIOL+CALCIFEROL SERPL-MC: 24 UG/L (ref 20–75)
ERYTHROCYTE [DISTWIDTH] IN BLOOD BY AUTOMATED COUNT: 12 % (ref 10–15)
HCT VFR BLD AUTO: 41.4 % (ref 35–47)
HGB BLD-MCNC: 13.6 G/DL (ref 11.7–15.7)
MCH RBC QN AUTO: 30.8 PG (ref 26.5–33)
MCHC RBC AUTO-ENTMCNC: 32.9 G/DL (ref 31.5–36.5)
MCV RBC AUTO: 94 FL (ref 78–100)
PLATELET # BLD AUTO: 232 10E9/L (ref 150–450)
RBC # BLD AUTO: 4.42 10E12/L (ref 3.8–5.2)
WBC # BLD AUTO: 6.4 10E9/L (ref 4–11)

## 2018-10-08 PROCEDURE — 87624 HPV HI-RISK TYP POOLED RSLT: CPT | Performed by: NURSE PRACTITIONER

## 2018-10-08 PROCEDURE — 36415 COLL VENOUS BLD VENIPUNCTURE: CPT | Performed by: NURSE PRACTITIONER

## 2018-10-08 PROCEDURE — G0145 SCR C/V CYTO,THINLAYER,RESCR: HCPCS | Performed by: NURSE PRACTITIONER

## 2018-10-08 PROCEDURE — 99213 OFFICE O/P EST LOW 20 MIN: CPT | Mod: 25 | Performed by: NURSE PRACTITIONER

## 2018-10-08 PROCEDURE — 80053 COMPREHEN METABOLIC PANEL: CPT | Performed by: NURSE PRACTITIONER

## 2018-10-08 PROCEDURE — 82306 VITAMIN D 25 HYDROXY: CPT | Performed by: NURSE PRACTITIONER

## 2018-10-08 PROCEDURE — 99395 PREV VISIT EST AGE 18-39: CPT | Performed by: NURSE PRACTITIONER

## 2018-10-08 PROCEDURE — 84443 ASSAY THYROID STIM HORMONE: CPT | Performed by: NURSE PRACTITIONER

## 2018-10-08 PROCEDURE — 85027 COMPLETE CBC AUTOMATED: CPT | Performed by: NURSE PRACTITIONER

## 2018-10-08 RX ORDER — CITALOPRAM HYDROBROMIDE 10 MG/1
TABLET ORAL
Qty: 120 TABLET | Refills: 0 | Status: SHIPPED | OUTPATIENT
Start: 2018-10-08 | End: 2018-12-13 | Stop reason: DRUGHIGH

## 2018-10-08 RX ORDER — DESOGESTREL AND ETHINYL ESTRADIOL 0.15-0.03
1 KIT ORAL DAILY
Qty: 84 TABLET | Refills: 3 | Status: SHIPPED | OUTPATIENT
Start: 2018-10-08 | End: 2019-10-11

## 2018-10-08 ASSESSMENT — ENCOUNTER SYMPTOMS
SHORTNESS OF BREATH: 0
PARESTHESIAS: 0
CONSTIPATION: 0
MYALGIAS: 0
ARTHRALGIAS: 0
ABDOMINAL PAIN: 0
COUGH: 0
NAUSEA: 0
DIZZINESS: 1
JOINT SWELLING: 0
DYSURIA: 0
BREAST MASS: 0
DIARRHEA: 0
WEAKNESS: 0
PALPITATIONS: 0
CHILLS: 0
HEMATOCHEZIA: 0
HEARTBURN: 0
NERVOUS/ANXIOUS: 1
HEADACHES: 0
HEMATURIA: 0
FREQUENCY: 0
FEVER: 0
EYE PAIN: 0
SORE THROAT: 0

## 2018-10-08 ASSESSMENT — ANXIETY QUESTIONNAIRES
GAD7 TOTAL SCORE: 15
5. BEING SO RESTLESS THAT IT IS HARD TO SIT STILL: NOT AT ALL
2. NOT BEING ABLE TO STOP OR CONTROL WORRYING: NEARLY EVERY DAY
7. FEELING AFRAID AS IF SOMETHING AWFUL MIGHT HAPPEN: NOT AT ALL
6. BECOMING EASILY ANNOYED OR IRRITABLE: NEARLY EVERY DAY
1. FEELING NERVOUS, ANXIOUS, OR ON EDGE: NEARLY EVERY DAY
3. WORRYING TOO MUCH ABOUT DIFFERENT THINGS: NEARLY EVERY DAY
IF YOU CHECKED OFF ANY PROBLEMS ON THIS QUESTIONNAIRE, HOW DIFFICULT HAVE THESE PROBLEMS MADE IT FOR YOU TO DO YOUR WORK, TAKE CARE OF THINGS AT HOME, OR GET ALONG WITH OTHER PEOPLE: SOMEWHAT DIFFICULT

## 2018-10-08 ASSESSMENT — PATIENT HEALTH QUESTIONNAIRE - PHQ9: 5. POOR APPETITE OR OVEREATING: NEARLY EVERY DAY

## 2018-10-08 NOTE — MR AVS SNAPSHOT
After Visit Summary   10/8/2018    Gay Cummins    MRN: [id number]           Patient Information     Date Of Birth          1985        Visit Information        Provider Department      10/8/2018 9:30 AM Lakshmi Nathan APRN [facility]        Today's Diagnoses     Routine general medical examination at a health care facility    -  1    Dizziness        SANDEEP (generalized anxiety disorder)        Moderate episode of recurrent major depressive disorder (H)        Encounter for initial prescription of contraceptive pills        Screening for malignant neoplasm of cervix        Hemorrhoids with prolapsed tissue that cannot be manually replaced          Care Instructions    I have referred you to colon and rectal.    The Pill: Start the contraceptive pill the first Sunday after your next period, even if you are still bleeding.  Take it at about the same time every day.  If you miss one day, take both the missed day and today's pill at the same time.  If you miss more than one day, call or mychart me to figure out how to get back on track.  Any time you miss a day, use condoms for the remainder of that cycle.  Take your blood pressure some time in the next 1-2 months and let me know if it's above 140/90.  Condoms are the only way to protect you against sexually transmitted diseases and many of these don't have any symptoms. It's important to have yearly screening. If you have any bothersome side effects, be sure to let me know.      Restart celexa and follow up with me in 1-2 months.      Preventive Health Recommendations  Female Ages 26 - 39  Yearly exam:   See your health care provider every year in order to    Review health changes.     Discuss preventive care.      Review your medicines if you your doctor has prescribed any.    Until age 30: Get a Pap test every three years (more often if you have had an abnormal result).    After age 30: Talk to your doctor about whether  you should have a Pap test every 3 years or have a Pap test with HPV screening every 5 years.   You do not need a Pap test if your uterus was removed (hysterectomy) and you have not had cancer.  You should be tested each year for STDs (sexually transmitted diseases), if you're at risk.   Talk to your provider about how often to have your cholesterol checked.  If you are at risk for diabetes, you should have a diabetes test (fasting glucose).  Shots: Get a flu shot each year. Get a tetanus shot every 10 years.   Nutrition:     Eat at least 5 servings of fruits and vegetables each day.    Eat whole-grain bread, whole-wheat pasta and brown rice instead of white grains and rice.    Get adequate Calcium and Vitamin D.     Lifestyle    Exercise at least 150 minutes a week (30 minutes a day, 5 days of the week). This will help you control your weight and prevent disease.    Limit alcohol to one drink per day.    No smoking.     Wear sunscreen to prevent skin cancer.    See your dentist every six months for an exam and cleaning.            Follow-ups after your visit        Additional Services     COLORECTAL SURGERY REFERRAL       Your provider has referred you to:     Referral Reasonsrectal swx   Special concerns None  This referral is Elective (week +)  It is OK to leave a message on patient's voicemail.    Please be aware that coverage of these services is subject to the terms and limitations of your health insurance plan.  Call member services at your health plan with any benefit or coverage questions.      Please bring the following to your appointment:  >>   Any x-rays, CTs or MRIs which have been performed.  Contact the facility where they were done to arrange for  prior to your scheduled appointment.  Any new CT, MRI or other procedures ordered by your specialist must be performed at a [facility] facility or coordinated by your clinic's referral office.   >>   List of current medications  >>   This referral  request   >>   Any documents/labs given to you for this referral                  Follow-up notes from your care team     Return in about 2 months (around 12/8/2018) for Depression and/or anxiety.      Who to contact     If you have questions or need follow up information about today's clinic visit or your schedule please contact [facility] directly at [phone].  Normal or non-critical lab and imaging results will be communicated to you by MyChart, letter or phone within 4 business days after the clinic has received the results. If you do not hear from us within 7 days, please contact the clinic through Triloqt or phone. If you have a critical or abnormal lab result, we will notify you by phone as soon as possible.  Submit refill requests through [employer] or call your pharmacy and they will forward the refill request to us. Please allow 3 business days for your refill to be completed.          Additional Information About Your Visit        [employer] Information     [employer] gives you secure access to your electronic health record. If you see a primary care provider, you can also send messages to your care team and make appointments. If you have questions, please call your primary care clinic.  If you do not have a primary care provider, please call [phone] and they will assist you.        Care EveryWhere ID     This is your Care EveryWhere ID. This could be used by other organizations to access your [place] medical records  [id number]        Your Vitals Were     Temperature Last Period BMI (Body Mass Index)             97.2  F (36.2  C) (Tympanic) 09/20/2018 28.58 kg/m2          Blood Pressure from Last 3 Encounters:   03/16/18 108/68   10/10/17 113/63   09/14/17 114/68    Weight from Last 3 Encounters:   10/08/18 182 lb 8 oz (82.8 kg)   03/16/18 176 lb 9.6 oz (80.1 kg)   10/10/17 175 lb 3.2 oz (79.5 kg)              We Performed the Following     CBC with platelets     COLORECTAL SURGERY  REFERRAL     Comprehensive metabolic panel     HPV High Risk Types DNA Cervical     Pap imaged thin layer screen with HPV - recommended age 30 - 65     TSH with free T4 reflex     Vitamin D Deficiency          Today's Medication Changes          These changes are accurate as of 10/8/18 10:19 AM.  If you have any questions, ask your nurse or doctor.               Start taking these medicines.        Dose/Directions    citalopram 10 MG tablet   Commonly known as:  celeXA   Used for:  SANDEEP (generalized anxiety disorder)   Started by:  Lakshmi Nathan APRN CNP        Take one tab PO daily x 1 wk, then take TWO tabs daily   Quantity:  120 tablet   Refills:  0       desogestrel-ethinyl estradiol 0.15-30 MG-MCG per tablet   Commonly known as:  APRI   Used for:  Encounter for initial prescription of contraceptive pills   Started by:  Lakshmi Nathan APRN CNP        Dose:  1 tablet   Take 1 tablet by mouth daily   Quantity:  84 tablet   Refills:  3            Where to get your medicines      These medications were sent to [facility]/pharmacy #[id number] - [facility] [address]  [address] [address] [facility] [address]     Phone:  [phone]     citalopram 10 MG tablet    desogestrel-ethinyl estradiol 0.15-30 MG-MCG per tablet                Primary Care Provider Office Phone # Fax #    TED Mcclure [phone] [address] [place] [address]        Equal Access to Services     [facility]: Hadii teodora donovan Somikel, waaxda luqadaha, qaybta kaalmada charu carvalho . So [facility] [phone].    ATENCIÓN: Si habla español, tiene a salazar disposición servicios gratuitos de asistencia lingüística. Llame al [phone].    We comply with applicable federal civil rights laws and [place] laws. We do not discriminate on the basis of race, color, national origin, age, disability, sex, sexual orientation, or gender  identity.            Thank you!     Thank you for choosing [facility]  for your care. Our goal is always to provide you with excellent care. Hearing back from our patients is one way we can continue to improve our services. Please take a few minutes to complete the written survey that you may receive in the mail after your visit with us. Thank you!             Your Updated Medication List - Protect others around you: Learn how to safely use, store and throw away your medicines at www.disposemymeds.org.          This list is accurate as of 10/8/18 10:19 AM.  Always use your most recent med list.                   Brand Name Dispense Instructions for use Diagnosis    citalopram 10 MG tablet    celeXA    120 tablet    Take one tab PO daily x 1 wk, then take TWO tabs daily    SANDEEP (generalized anxiety disorder)       desogestrel-ethinyl estradiol 0.15-30 MG-MCG per tablet    APRI    84 tablet    Take 1 tablet by mouth daily    Encounter for initial prescription of contraceptive pills

## 2018-10-08 NOTE — PATIENT INSTRUCTIONS
I have referred you to colon and rectal.    The Pill: Start the contraceptive pill the first Sunday after your next period, even if you are still bleeding.  Take it at about the same time every day.  If you miss one day, take both the missed day and today's pill at the same time.  If you miss more than one day, call or mychart me to figure out how to get back on track.  Any time you miss a day, use condoms for the remainder of that cycle.  Take your blood pressure some time in the next 1-2 months and let me know if it's above 140/90.  Condoms are the only way to protect you against sexually transmitted diseases and many of these don't have any symptoms. It's important to have yearly screening. If you have any bothersome side effects, be sure to let me know.      Restart celexa and follow up with me in 1-2 months.      Preventive Health Recommendations  Female Ages 26 - 39  Yearly exam:   See your health care provider every year in order to    Review health changes.     Discuss preventive care.      Review your medicines if you your doctor has prescribed any.    Until age 30: Get a Pap test every three years (more often if you have had an abnormal result).    After age 30: Talk to your doctor about whether you should have a Pap test every 3 years or have a Pap test with HPV screening every 5 years.   You do not need a Pap test if your uterus was removed (hysterectomy) and you have not had cancer.  You should be tested each year for STDs (sexually transmitted diseases), if you're at risk.   Talk to your provider about how often to have your cholesterol checked.  If you are at risk for diabetes, you should have a diabetes test (fasting glucose).  Shots: Get a flu shot each year. Get a tetanus shot every 10 years.   Nutrition:     Eat at least 5 servings of fruits and vegetables each day.    Eat whole-grain bread, whole-wheat pasta and brown rice instead of white grains and rice.    Get adequate Calcium and Vitamin D.      Lifestyle    Exercise at least 150 minutes a week (30 minutes a day, 5 days of the week). This will help you control your weight and prevent disease.    Limit alcohol to one drink per day.    No smoking.     Wear sunscreen to prevent skin cancer.    See your dentist every six months for an exam and cleaning.

## 2018-10-08 NOTE — LETTER
My Depression Action Plan  Name: Gay Cummins   Date of Birth 1985  Date: 10/8/2018    My doctor: Lakshmi Nathan   My clinic: [address]  [address]  [facility] [address] [phone]          GREEN    ZONE   Good Control    What it looks like:     Things are going generally well. You have normal up s and down s. You may even feel depressed from time to time, but bad moods usually last less than a day.   What you need to do:  1. Continue to care for yourself (see self care plan)  2. Check your depression survival kit and update it as needed  3. Follow your physician s recommendations including any medication.  4. Do not stop taking medication unless you consult with your physician first.           YELLOW         ZONE Getting Worse    What it looks like:     Depression is starting to interfere with your life.     It may be hard to get out of bed; you may be starting to isolate yourself from others.    Symptoms of depression are starting to last most all day and this has happened for several days.     You may have suicidal thoughts but they are not constant.   What you need to do:     1. Call your care team, your response to treatment will improve if you keep your care team informed of your progress. Yellow periods are signs an adjustment may need to be made.     2. Continue your self-care, even if you have to fake it!    3. Talk to someone in your support network    4. Open up your depression survival kit           RED    ZONE Medical Alert - Get Help    What it looks like:     Depression is seriously interfering with your life.     You may experience these or other symptoms: You can t get out of bed most days, can t work or engage in other necessary activities, you have trouble taking care of basic hygiene, or basic responsibilities, thoughts of suicide or death that will not go away, self-injurious behavior.     What you need to do:  1. Call  your care team and request a same-day appointment. If they are not available (weekends or after hours) call your local crisis line, emergency room or 911.            Depression Self Care Plan / Survival Kit    Self-Care for Depression  Here s the deal. Your body and mind are really not as separate as most people think.  What you do and think affects how you feel and how you feel influences what you do and think. This means if you do things that people who feel good do, it will help you feel better.  Sometimes this is all it takes.  There is also a place for medication and therapy depending on how severe your depression is, so be sure to consult with your medical provider and/ or Behavioral Health Consultant if your symptoms are worsening or not improving.     In order to better manage my stress, I will:    Exercise  Get some form of exercise, every day. This will help reduce pain and release endorphins, the  feel good  chemicals in your brain. This is almost as good as taking antidepressants!  This is not the same as joining a gym and then never going! (they count on that by the way ) It can be as simple as just going for a walk or doing some gardening, anything that will get you moving.      Hygiene   Maintain good hygiene (Get out of bed in the morning, Make your bed, Brush your teeth, Take a shower, and Get dressed like you were going to work, even if you are unemployed).  If your clothes don't fit try to get ones that do.    Diet  I will strive to eat foods that are good for me, drink plenty of water, and avoid excessive sugar, caffeine, alcohol, and other mood-altering substances.  Some foods that are helpful in depression are: complex carbohydrates, B vitamins, flaxseed, fish or fish oil, fresh fruits and vegetables.    Psychotherapy  I agree to participate in Individual Therapy (if recommended).    Medication  If prescribed medications, I agree to take them.  Missing doses can result in serious side effects.   I understand that drinking alcohol, or other illicit drug use, may cause potential side effects.  I will not stop my medication abruptly without first discussing it with my provider.    Staying Connected With Others  I will stay in touch with my friends, family members, and my primary care provider/team.    Use your imagination  Be creative.  We all have a creative side; it doesn t matter if it s oil painting, sand castles, or mud pies! This will also kick up the endorphins.    Witness Beauty  (AKA stop and smell the roses) Take a look outside, even in mid-winter. Notice colors, textures. Watch the squirrels and birds.     Service to others  Be of service to others.  There is always someone else in need.  By helping others we can  get out of ourselves  and remember the really important things.  This also provides opportunities for practicing all the other parts of the program.    Humor  Laugh and be silly!  Adjust your TV habits for less news and crime-drama and more comedy.    Control your stress  Try breathing deep, massage therapy, biofeedback, and meditation. Find time to relax each day.     My support system    Clinic Contact:  Phone number:    Contact 1:  Phone number:    Contact 2:  Phone number:    Baptism/:  Phone number:    Therapist:  Phone number:    [facility] crisis center:    Phone number:    Other community support:  Phone number:

## 2018-10-08 NOTE — PROGRESS NOTES
"   SUBJECTIVE:   CC: Gay Cummins is an 33 year old woman who presents for preventive health visit.     Physical   Annual:     Getting at least 3 servings of Calcium per day:  NO    Bi-annual eye exam:  NO    Dental care twice a year:  NO    Sleep apnea or symptoms of sleep apnea:  None    Diet:  Regular (no restrictions)    Frequency of exercise:  None    Taking medications regularly:  Yes    Medication side effects:  Not applicable    Additional concerns today:  YES (Discuss restarting Celexa, hemorrhoids, discuss getting IUD.)    history OF anxiety, AFTER HAVING LAST baby, she had increase in anxiety and depression sx and started celexa and had improvement of mood. Since then, she had stopped celexa about 9 mo ago. Since then, she reports she was initially doing well, but symptoms have worsened. Denies thoughts of self harm or harming others and denies suicidal ideation. She also reports an increase in dizziness and is not sure if it's related to anxiety. She denies ear symptoms of any kind. She has also cut caffeine and is not sure if this is a factor. She works as a RN.     SANDEEP-7 SCORE 9/12/2017 10/10/2017 10/8/2018   Total Score 13 0 15     She has been on patch for contraception. She reports she had side effects of 'emotional wreck.\" She wonders about IUD. She is not on birth control.     She has rectal symptoms - she had hemorrhoids during pregnancy which did resolve and with hemorrhoid cream, and she doesn't have the firm hemorrhoid or any blood, but she continues to have itching and irritation at her rectum.       Today's PHQ-2 Score:   PHQ-2 ( 1999 [employer]) 10/8/2018   Q1: Little interest or pleasure in doing things 0   Q2: Feeling down, depressed or hopeless 1   PHQ-2 Score 1   Q1: Little interest or pleasure in doing things Not at all   Q2: Feeling down, depressed or hopeless Several days   PHQ-2 Score 1       Abuse: Current or Past(Physical, Sexual or Emotional)- No  Do you feel safe in your " environment - Yes    Social History   Substance Use Topics     Smoking status: Never Smoker     Smokeless tobacco: Never Used     Alcohol use No      Comment: not during pregnancy     Alcohol Use 10/8/2018   If you drink alcohol do you typically have greater than 3 drinks per day OR greater than 7 drinks per week? No   No flowsheet data found.    Reviewed orders with patient.  Reviewed health maintenance and updated orders accordingly - Yes  Labs reviewed in EPIC    Mammogram not appropriate for this patient based on age.    Pertinent mammograms are reviewed under the imaging tab.  History of abnormal Pap smear: NO - age 30-65 PAP every 5 years with negative HPV co-testing recommended     Reviewed and updated as needed this visit by clinical staff  Tobacco  Allergies  Meds  Med Hx  Surg Hx  Fam Hx  Soc Hx        Reviewed and updated as needed this visit by Provider            Review of Systems   Constitutional: Negative for chills and fever.   HENT: Negative for congestion, ear pain, hearing loss and sore throat.    Eyes: Negative for pain and visual disturbance.   Respiratory: Negative for cough and shortness of breath.    Cardiovascular: Negative for chest pain, palpitations and peripheral edema.   Gastrointestinal: Negative for abdominal pain, constipation, diarrhea, heartburn, hematochezia and nausea.   Breasts:  Negative for tenderness, breast mass and discharge.   Genitourinary: Negative for dysuria, frequency, genital sores, hematuria, pelvic pain, urgency, vaginal bleeding and vaginal discharge.   Musculoskeletal: Negative for arthralgias, joint swelling and myalgias.   Skin: Negative for rash.   Neurological: Positive for dizziness. Negative for weakness, headaches and paresthesias.   Psychiatric/Behavioral: Positive for mood changes. The patient is nervous/anxious.         OBJECTIVE:   Temp 97.2  F (36.2  C) (Tympanic)  Wt 182 lb 8 oz (82.8 kg)  LMP 09/20/2018  BMI 28.58 kg/m2  Physical  Exam  GENERAL: healthy, alert and no distress  EYES: Eyes grossly normal to inspection, PERRL and conjunctivae and sclerae normal  HENT: ear canals and TM's normal, nose and mouth without ulcers or lesions  NECK: no adenopathy, no asymmetry, masses, or scars and thyroid normal to palpation  RESP: lungs clear to auscultation - no rales, rhonchi or wheezes  CV: regular rate and rhythm, normal S1 S2, no S3 or S4, no murmur, click or rub, no peripheral edema and peripheral pulses strong  ABDOMEN: soft, nontender, no hepatosplenomegaly, no masses and bowel sounds normal   (female): normal female external genitalia, normal urethral meatus, vaginal mucosa, normal cervix/adnexa/uterus without masses or discharge  RECTAL (female): loose skin around rectum, no skin changes and no hemorrhoid  MS: no gross musculoskeletal defects noted, no edema  PSYCH: mentation appears normal, affect normal/bright      ASSESSMENT/PLAN:   1. Routine general medical examination at a health care facility      2. Dizziness  She reports dizziness, which is new for her. She wonders if it's related to decreasing caffiene recently OR her mood and anxiety. Will draw labs today to rule this out, however will continue to monitor with restarting celexa to see if it resolves.   - TSH with free T4 reflex  - CBC with platelets  - Comprehensive metabolic panel  - Vitamin D Deficiency    3. SANDEEP (generalized anxiety disorder)  This ha reeturned, and she has had success with celexa iin the past without side effects or problems. We reviewed black box warning and she will restart and return to clinic in 1-2 mo  - citalopram (CELEXA) 10 MG tablet; Take one tab PO daily x 1 wk, then take TWO tabs daily  Dispense: 120 tablet; Refill: 0    4. Moderate episode of recurrent major depressive disorder (H)  jper above    5. Encounter for initial prescription of contraceptive pills  Discussed OCP, discussed increased risk for HTN and clot and instructed to contact me if  "she exhibits worrisome symptoms, which were discussed.  Encouraged to occasionally monitor BP and contact me if >140/90.  Informed patient OCP does not protect against STI transmission, and the need to use condoms.  - desogestrel-ethinyl estradiol (APRI) 0.15-30 MG-MCG per tablet; Take 1 tablet by mouth daily  Dispense: 84 tablet; Refill: 3    6. Screening for malignant neoplasm of cervix    - Pap imaged thin layer screen with HPV - recommended age 30 - 65  - HPV High Risk Types DNA Cervical    7. Hemorrhoids with prolapsed tissue that cannot be manually replaced    - COLORECTAL SURGERY REFERRAL    COUNSELING:  Reviewed preventive health counseling, as reflected in patient instructions  Special attention given to:        Regular exercise       Healthy diet/nutrition       Contraception       Family planning       Folic Acid Counseling       Osteoporosis Prevention/Bone Health    BP Readings from Last 1 Encounters:   03/16/18 108/68     Estimated body mass index is 28.58 kg/(m^2) as calculated from the following:    Height as of 3/16/18: 5' 7\" (1.702 m).    Weight as of this encounter: 182 lb 8 oz (82.8 kg).           reports that she has never smoked. She has never used smokeless tobacco.      Counseling Resources:  ATP IV Guidelines  Pooled Cohorts Equation Calculator  Breast Cancer Risk Calculator  FRAX Risk Assessment  ICSI Preventive Guidelines  Dietary Guidelines for Americans, 2010  USDA's MyPlate  ASA Prophylaxis  Lung CA Screening    TED [facility] MAK  "

## 2018-10-09 ENCOUNTER — TELEPHONE (OUTPATIENT)
Dept: PEDIATRICS | Facility: CLINIC | Age: 33
End: 2018-10-09

## 2018-10-09 DIAGNOSIS — F41.1 GAD (GENERALIZED ANXIETY DISORDER): ICD-10-CM

## 2018-10-09 DIAGNOSIS — F33.1 MODERATE EPISODE OF RECURRENT MAJOR DEPRESSIVE DISORDER (H): Primary | ICD-10-CM

## 2018-10-09 LAB
ALBUMIN SERPL-MCNC: 3.7 G/DL (ref 3.4–5)
ALP SERPL-CCNC: 62 U/L (ref 40–150)
ALT SERPL W P-5'-P-CCNC: 20 U/L (ref 0–50)
ANION GAP SERPL CALCULATED.3IONS-SCNC: 7 MMOL/L (ref 3–14)
AST SERPL W P-5'-P-CCNC: 12 U/L (ref 0–45)
BILIRUB SERPL-MCNC: 0.4 MG/DL (ref 0.2–1.3)
BUN SERPL-MCNC: 10 MG/DL (ref 7–30)
CALCIUM SERPL-MCNC: 8.9 MG/DL (ref 8.5–10.1)
CHLORIDE SERPL-SCNC: 108 MMOL/L (ref 94–109)
CO2 SERPL-SCNC: 25 MMOL/L (ref 20–32)
CREAT SERPL-MCNC: 0.76 MG/DL (ref 0.52–1.04)
GFR SERPL CREATININE-BSD FRML MDRD: 88 ML/MIN/1.7M2
GLUCOSE SERPL-MCNC: 79 MG/DL (ref 70–99)
POTASSIUM SERPL-SCNC: 4.4 MMOL/L (ref 3.4–5.3)
PROT SERPL-MCNC: 7.4 G/DL (ref 6.8–8.8)
SODIUM SERPL-SCNC: 140 MMOL/L (ref 133–144)
TSH SERPL DL<=0.005 MIU/L-ACNC: 0.93 MU/L (ref 0.4–4)

## 2018-10-09 RX ORDER — CITALOPRAM HYDROBROMIDE 20 MG/1
20 TABLET ORAL DAILY
Qty: 30 TABLET | Refills: 1 | Status: SHIPPED | OUTPATIENT
Start: 2018-10-09 | End: 2018-12-13

## 2018-10-09 ASSESSMENT — ANXIETY QUESTIONNAIRES: GAD7 TOTAL SCORE: 15

## 2018-10-09 ASSESSMENT — PATIENT HEALTH QUESTIONNAIRE - PHQ9: SUM OF ALL RESPONSES TO PHQ QUESTIONS 1-9: 12

## 2018-10-09 NOTE — TELEPHONE ENCOUNTER
Fax rec'd-Insurance wont cover Citalopram 10mg two tablets daily, need new prescription for 20mg to [facility] in [place].

## 2018-10-10 LAB
COPATH REPORT: NORMAL
PAP: NORMAL

## 2018-10-12 LAB
FINAL DIAGNOSIS: NORMAL
HPV HR 12 DNA CVX QL NAA+PROBE: NEGATIVE
HPV16 DNA SPEC QL NAA+PROBE: NEGATIVE
HPV18 DNA SPEC QL NAA+PROBE: NEGATIVE
SPECIMEN DESCRIPTION: NORMAL
SPECIMEN SOURCE CVX/VAG CYTO: NORMAL

## 2018-12-07 ENCOUNTER — TELEPHONE (OUTPATIENT)
Dept: PEDIATRICS | Facility: CLINIC | Age: 33
End: 2018-12-07

## 2018-12-07 DIAGNOSIS — K64.4 EXTERNAL HEMORRHOIDS: Primary | ICD-10-CM

## 2018-12-07 NOTE — TELEPHONE ENCOUNTER
New order placed - [lease give her ocntact info. She does need to schedule follow-up visit - ok to be phone.

## 2018-12-07 NOTE — TELEPHONE ENCOUNTER
Left voicemail for patient to call back the clinic.     When patient calls back, please inform     - Please set up a follow up appointment for Citalopram. Office visit or Phone visit ok.     - Referral info: [facility] [phone]   [email or web address]

## 2018-12-07 NOTE — TELEPHONE ENCOUNTER
Reason for call:  Other   Patient called regarding (reason for call): call back  Additional comments: Patient calling in to inform Lakshmi Nathan that things are going okay on the Celexa. Does she need an appointment if things are going okay?  Also, calling with information for a referral that she discuss back in October. Referral needs to be for [facility] at the [facility].    Phone number to reach patient:  Home number on file [phone] (home)    Best Time:  any    Can we leave a detailed message on this number?  NO

## 2018-12-10 NOTE — TELEPHONE ENCOUNTER
Patient notified with information noted below from provider and agrees with plan.  Scheduled appointment for Thursday to follow up with provider.  Danita MINAYA RN - Triage  [facility]

## 2018-12-13 ENCOUNTER — OFFICE VISIT (OUTPATIENT)
Dept: PEDIATRICS | Facility: CLINIC | Age: 33
End: 2018-12-13
Payer: COMMERCIAL

## 2018-12-13 VITALS
HEART RATE: 71 BPM | WEIGHT: 178.5 LBS | BODY MASS INDEX: 27.96 KG/M2 | DIASTOLIC BLOOD PRESSURE: 70 MMHG | SYSTOLIC BLOOD PRESSURE: 107 MMHG | TEMPERATURE: 97 F

## 2018-12-13 DIAGNOSIS — F33.1 MODERATE EPISODE OF RECURRENT MAJOR DEPRESSIVE DISORDER (H): Primary | ICD-10-CM

## 2018-12-13 DIAGNOSIS — F41.1 GAD (GENERALIZED ANXIETY DISORDER): ICD-10-CM

## 2018-12-13 PROCEDURE — 99213 OFFICE O/P EST LOW 20 MIN: CPT | Performed by: NURSE PRACTITIONER

## 2018-12-13 RX ORDER — CITALOPRAM HYDROBROMIDE 20 MG/1
20 TABLET ORAL DAILY
Qty: 90 TABLET | Refills: 0 | Status: SHIPPED | OUTPATIENT
Start: 2018-12-13 | End: 2019-03-05

## 2018-12-13 ASSESSMENT — PATIENT HEALTH QUESTIONNAIRE - PHQ9
SUM OF ALL RESPONSES TO PHQ QUESTIONS 1-9: 4
5. POOR APPETITE OR OVEREATING: NOT AT ALL

## 2018-12-13 ASSESSMENT — ANXIETY QUESTIONNAIRES
6. BECOMING EASILY ANNOYED OR IRRITABLE: NOT AT ALL
1. FEELING NERVOUS, ANXIOUS, OR ON EDGE: NOT AT ALL
3. WORRYING TOO MUCH ABOUT DIFFERENT THINGS: SEVERAL DAYS
7. FEELING AFRAID AS IF SOMETHING AWFUL MIGHT HAPPEN: NOT AT ALL
IF YOU CHECKED OFF ANY PROBLEMS ON THIS QUESTIONNAIRE, HOW DIFFICULT HAVE THESE PROBLEMS MADE IT FOR YOU TO DO YOUR WORK, TAKE CARE OF THINGS AT HOME, OR GET ALONG WITH OTHER PEOPLE: NOT DIFFICULT AT ALL
2. NOT BEING ABLE TO STOP OR CONTROL WORRYING: NOT AT ALL

## 2018-12-13 NOTE — PROGRESS NOTES
SUBJECTIVE:   Gay Cummins is a 33 year old female who presents to clinic today for the following health issues    Depression and Anxiety Follow-Up    Status since last visit: Improved     Other associated symptoms: none    Complicating factors:     Significant life event: No     Current substance abuse: None    PHQ 10/10/2017 10/8/2018 2018   PHQ-9 Total Score 2 12 4   Q9: Suicide Ideation Not at all Not at all Not at all     SANDEEP-7 SCORE 2017 10/10/2017 10/8/2018   Total Score 13 0 15     In the past two weeks have you had thoughts of suicide or self-harm?  No.    Do you have concerns about your personal safety or the safety of others?   No  PHQ-9  English  PHQ-9   Any Language  SANDEEP-7  Suicide Assessment Five-step Evaluation and Treatment (SAFE-T)    Amount of exercise or physical activity: None    Problems taking medications regularly: No    Medication side effects: dry mouth    Diet: regular (no restrictions)    At last office visit 2 month ago, we restarted celexa and since then, she reports an improvement of her symptoms after 1 month. She does have side effects of dry mouth, which is new for her. She ha a history of depression but these were never an issue for her and has not been a recent issue for her and no worsening symptoms for her. She has also been seeing a therapist. She had also had some dizziness that has resolved.     Problem list and histories reviewed & adjusted, as indicated.  Additional history: as documented    Patient Active Problem List   Diagnosis     Sacroiliac joint pain     Moderate episode of recurrent major depressive disorder (H)     SANDEEP (generalized anxiety disorder)     Past Surgical History:   Procedure Laterality Date     ADENOIDECTOMY  2009      SECTION N/A 2017    Procedure:  SECTION;  priamry  section ;  Surgeon: Chela Maria DO;  Location: [facility] OR     CHOLECYSTECTOMY        TOOTH EXTRACTION W/FORCEP         Social  History     Tobacco Use     Smoking status: Never Smoker     Smokeless tobacco: Never Used   Substance Use Topics     Alcohol use: No     Alcohol/week: 0.0 oz     Comment: not during pregnancy     Family History   Problem Relation Age of Onset     Heart Disease Paternal Grandfather      Breast Cancer Paternal Grandmother      Diabetes Type 2  Maternal Grandfather      Lung Cancer Maternal Grandfather         smoker     Thyroid Disease Maternal Grandmother      Cerebrovascular Disease Maternal Grandmother 75     Rheumatoid Arthritis Mother 50     Family History Negative Father      Family History Negative Sister            Reviewed and updated as needed this visit by clinical staff  Tobacco  Allergies  Meds  Med Hx  Surg Hx  Fam Hx  Soc Hx      Reviewed and updated as needed this visit by Provider         ROS:  Constitutional, HEENT, cardiovascular, pulmonary, gi and gu systems are negative, except as otherwise noted.    OBJECTIVE:     /70   Pulse 71   Temp 97  F (36.1  C) (Tympanic)   Wt 81 kg (178 lb 8 oz)   BMI 27.96 kg/m    Body mass index is 27.96 kg/m .  GENERAL: healthy, alert and no distress  PSYCH: mentation appears normal, affect normal/bright      ASSESSMENT/PLAN:     1. Moderate episode of recurrent major depressive disorder (H)  stable  - citalopram (CELEXA) 20 MG tablet; Take 1 tablet (20 mg) by mouth daily  Dispense: 90 tablet; Refill: 0    2. SANDEEP (generalized anxiety disorder)  Doing much better on medications. Will continue to see her therapist. Questions answered  - citalopram (CELEXA) 20 MG tablet; Take 1 tablet (20 mg) by mouth daily  Dispense: 90 tablet; Refill: 0    There are no Patient Instructions on file for this visit.      TED [facility] MAK

## 2019-03-01 ENCOUNTER — OFFICE VISIT (OUTPATIENT)
Dept: PEDIATRICS | Facility: CLINIC | Age: 34
End: 2019-03-01
Payer: COMMERCIAL

## 2019-03-01 VITALS
HEIGHT: 67 IN | OXYGEN SATURATION: 98 % | WEIGHT: 180 LBS | TEMPERATURE: 97.8 F | BODY MASS INDEX: 28.25 KG/M2 | SYSTOLIC BLOOD PRESSURE: 114 MMHG | HEART RATE: 76 BPM | DIASTOLIC BLOOD PRESSURE: 66 MMHG

## 2019-03-01 DIAGNOSIS — H66.001 ACUTE SUPPURATIVE OTITIS MEDIA OF RIGHT EAR WITHOUT SPONTANEOUS RUPTURE OF TYMPANIC MEMBRANE, RECURRENCE NOT SPECIFIED: Primary | ICD-10-CM

## 2019-03-01 PROCEDURE — 99213 OFFICE O/P EST LOW 20 MIN: CPT | Performed by: PHYSICIAN ASSISTANT

## 2019-03-01 ASSESSMENT — MIFFLIN-ST. JEOR: SCORE: 1559.1

## 2019-03-01 NOTE — PROGRESS NOTES
"  SUBJECTIVE:   Gay Cummins is a 33 year old female who presents to clinic today for the following health issues:    Acute Illness   Acute illness concerns: ear  Onset: 1.5 weeks    Fever: no    Chills/Sweats: no    Headache (location?): YES- frontal    Sinus Pressure:YES- post-nasal drainage and facial pain    Conjunctivitis:  no    Ear Pain: YES: bilateral    Rhinorrhea: YES- clear    Congestion: no    Sore Throat: YES     Cough: YES-non-productive    Wheeze: no    Decreased Appetite: no    Nausea: no    Vomiting: no    Diarrhea:  no    Dysuria/Freq.: no    Fatigue/Achiness: no    Sick/Strep Exposure: YES     Therapies Tried and outcome: Flonase, Ibuprofen, Robitussin    ROS:  ROS otherwise negative    OBJECTIVE:                                                    /66 (BP Location: Right arm, Patient Position: Sitting, Cuff Size: Adult Regular)   Pulse 76   Temp 97.8  F (36.6  C) (Tympanic)   Ht 1.702 m (5' 7\")   Wt 81.6 kg (180 lb)   SpO2 98%   BMI 28.19 kg/m    Body mass index is 28.19 kg/m .   GENERAL: alert, no distress  HENT: ear canals- normal; TMs- right: purulent TM; left: effusion present; Nose- normal; Mouth- no ulcers, no lesions  NECK: no tenderness, no adenopathy  RESP: lungs clear to auscultation - no rales, no rhonchi, no wheezes  CV: regular rates and rhythm, normal S1 S2, no S3 or S4 and no murmur, no click or rub    Diagnostic test results:  No results found for this or any previous visit (from the past 24 hour(s)).     ASSESSMENT/PLAN:                                                    (H66.001) Acute suppurative otitis media of right ear without spontaneous rupture of tympanic membrane, recurrence not specified  (primary encounter diagnosis)  Comment: begin antibiotics, flonase, fluids.   Plan: amoxicillin-clavulanate (AUGMENTIN) 875-125 MG         tablet          Jagdeep Moore PA-C  [facility] MAK  "

## 2019-03-04 ENCOUNTER — TELEPHONE (OUTPATIENT)
Dept: PEDIATRICS | Facility: CLINIC | Age: 34
End: 2019-03-04

## 2019-03-04 DIAGNOSIS — H66.001 ACUTE SUPPURATIVE OTITIS MEDIA OF RIGHT EAR WITHOUT SPONTANEOUS RUPTURE OF TYMPANIC MEMBRANE, RECURRENCE NOT SPECIFIED: Primary | ICD-10-CM

## 2019-03-04 NOTE — TELEPHONE ENCOUNTER
Reason for call:  Symptom   Symptom or request: Ear pain    Duration (how long have symptoms been present): 4 days  Have you been treated for this before? Yes    Additional comments: Patient was seen by Anastasia Montano on 3/1/2019.  Prescribed medication but patient not feeling any better.    Phone number to reach patient:  Home number on file [phone] (home)    Best Time:  any    Can we leave a detailed message on this number?  YES

## 2019-03-05 DIAGNOSIS — F41.1 GAD (GENERALIZED ANXIETY DISORDER): ICD-10-CM

## 2019-03-05 DIAGNOSIS — F33.1 MODERATE EPISODE OF RECURRENT MAJOR DEPRESSIVE DISORDER (H): ICD-10-CM

## 2019-03-05 NOTE — TELEPHONE ENCOUNTER
The pt is aware of the providers message and she will call back if needed.   Dalia Rubio on 3/5/2019 at 3:32 PM

## 2019-03-05 NOTE — TELEPHONE ENCOUNTER
Right ear infection.  Seen on 3/1 with plan:    Return in about 1 week (around 3/8/2019) for if symptoms worsen or fail to improve.   Begin antibiotics --take with food   Push fluids  Continue with flonase, ibuprofen      Call to patient to discuss symptoms-patient started medication-Augment- on 3/1, using Flonase and Ibuprofen.  States that there has been no improvement is symptoms, not even slightly.  No fever, chills, or sweats.    Staying hydrated-able to drink fluids and urinating ok, normal UO.    Asking for different abx, not interested in getting Bactrim due to seeing a lot of side effects of this abx-no allergy to it though)    Pharmacy loaded, please advise.  Ok to call back and leave a detailed message for patient.    Routing to Luciana who saw patient on 3/1 in PCP's absence this week.     Sandy Boo, RN  Message handled by Nurse Triage.

## 2019-03-05 NOTE — TELEPHONE ENCOUNTER
Patient needs to give medication another day. Please call tomorrow afternoon with an update.   Jagdeep Moore PA-C

## 2019-03-05 NOTE — TELEPHONE ENCOUNTER
"Requested Prescriptions   Pending Prescriptions Disp Refills     citalopram (CELEXA) 20 MG tablet [Pharmacy Med Name: CITALOPRAM HBR 20 MG TABLET]    Last Written Prescription Date:  12/13/2018  Last Fill Quantity: 90,  # refills: 0   Last office visit: 3/1/2019 with prescribing provider:  Lakshmi Nathan     Future Office Visit:     90 tablet 0     Sig: TAKE 1 TABLET BY MOUTH EVERY DAY    SSRIs Protocol Passed - 3/5/2019  9:01 AM       Passed - PHQ-9 score less than 5 in past 6 months    Please review last PHQ-9 score.          Passed - Medication is active on med list       Passed - Patient is age 18 or older       Passed - No active pregnancy on record       Passed - No positive pregnancy test in last 12 months       Passed - Recent (6 mo) or future (30 days) visit within the authorizing provider's specialty    Patient had office visit in the last 6 months or has a visit in the next 30 days with authorizing provider or within the authorizing provider's specialty.  See \"Patient Info\" tab in inbasket, or \"Choose Columns\" in Meds & Orders section of the refill encounter.              "

## 2019-03-06 RX ORDER — CEFDINIR 300 MG/1
300 CAPSULE ORAL 2 TIMES DAILY
Qty: 20 CAPSULE | Refills: 0 | Status: SHIPPED | OUTPATIENT
Start: 2019-03-06 | End: 2019-04-24

## 2019-03-06 NOTE — TELEPHONE ENCOUNTER
The pt is aware of the providers message and has no further questions at this time.   Dalia Rubio on 3/6/2019 at 4:14 PM

## 2019-03-06 NOTE — TELEPHONE ENCOUNTER
"Patient also reports sinus congestion.   Sinus congestion has gotten worse since Friday.  The ear pain is the most bothersome.  Pain from rt ear is radiating to her jaw.    \"Feels like it is underwater\"  Left ear feels \"full.\"  Patient was prescribed Augmentin on Friday, and feels that symptoms have gotten worse.  Does patient need to be re-evaluated or change in antibiotic therapy?  REI Wilson RN    "

## 2019-03-06 NOTE — TELEPHONE ENCOUNTER
Patient is calling back.    She is getting worse, her hearing is worse.  Patient would like another plan of care.     Preferred number is:  [phone]  Able to leave voicemail.

## 2019-03-06 NOTE — TELEPHONE ENCOUNTER
Ok to change antibiotics to cefdinir.   Discontinue augmentin.   This will treat ear and sinuses.   She needs to continue her flonase.   Jagdeep Moore PA-C

## 2019-03-07 RX ORDER — CITALOPRAM HYDROBROMIDE 20 MG/1
TABLET ORAL
Qty: 30 TABLET | Refills: 0 | Status: SHIPPED | OUTPATIENT
Start: 2019-03-07 | End: 2019-04-22

## 2019-03-07 NOTE — TELEPHONE ENCOUNTER
Due for office visit with PCP. Per LOV:    Instructions         Return in about 3 months (around 3/13/2019) for Depression and/or anxiety.     Please call the Pt and schedule an office visit with PCP. I will issue a 1 month supply. Thank you.     Debora Deluna RN -- [facility] Work[facility]

## 2019-03-22 ENCOUNTER — TELEPHONE (OUTPATIENT)
Dept: PEDIATRICS | Facility: CLINIC | Age: 34
End: 2019-03-22

## 2019-03-22 NOTE — TELEPHONE ENCOUNTER
Panel Management Review        Composite cancer screening  Chart review shows that this patient is due/due soon for the following None  Summary:    Patient is due/failing the following:   PHQ9    Action needed:   Patient needs to do PHQ9.    Type of outreach:    Sent letter.    Questions for provider review:    None                                                                                                                                    Nancy Douglas CMA([facility])           '

## 2019-03-22 NOTE — LETTER
March 22, 2019      Gay Cummins  [address] [address] [facility] [address]        Dear Gay,       We care about your health and have reviewed your health plan including your medical conditions, medications, and lab results.  Based on this review, it is recommended that you follow up regarding the following health topic(s):  -Depression    We recommend you take the following action(s):  -Complete and return the attached PHQ-9 Form.  If your total score is greater than 9, please schedule a followup appointment.  If you answer Yes to question 9, call your clinic between the hours of 8 to 5.  You may also call the Suicide Hotline at 2-898-017-IAAS (6229) any time.     Please call us at the [facility] - [phone] (or use [facility]) to address the above recommendations.     Thank you for trusting [facility] and we appreciate the opportunity to serve you.  We look forward to supporting your healthcare needs in the future.    Healthy Regards,    Your Health Care Team  [facility]

## 2019-04-17 ENCOUNTER — TELEPHONE (OUTPATIENT)
Dept: PEDIATRICS | Facility: CLINIC | Age: 34
End: 2019-04-17

## 2019-04-17 NOTE — TELEPHONE ENCOUNTER
I would recommend seeing a provider before going forward with ENT referral to reassess. It has been 6 month since the last note regarding this issue.

## 2019-04-17 NOTE — TELEPHONE ENCOUNTER
Calling for referral to ENT    Patient notes that at last appointment was recommended that she should see ENT with concerns for dizziness.  States when she saw PCP previously she was told to follow up with ENT for her ongoing dizziness.  States that it went a way some however, returned again now so wants to move forward with referral.    Denies the symptoms are worsening or changing from when she was previously seen.  Reported having ongoing concerns with Dizziness.    ENT referral pended for provider review and approval.    Danita MINAYA RN - Triage  [facility]      LOV NOTES: 10/8/2019  2. Dizziness  She reports dizziness, which is new for her. She wonders if it's related to decreasing caffiene recently OR her mood and anxiety. Will draw labs today to rule this out, however will continue to monitor with restarting celexa to see if it resolves.   - TSH with free T4 reflex  - CBC with platelets  - Comprehensive metabolic panel  - Vitamin D Deficiency    All labs returned WNL per chart review.

## 2019-04-17 NOTE — TELEPHONE ENCOUNTER
Reason for Call:  Other call back    Detailed comments: The pt was calling and she is still experiencing dizziness and she is wondering if she should be seen in ENT. If so, she is wondering if the provider has any recommendations.     Phone Number Patient can be reached at: Home number on file [phone] (home)    Best Time: Anytime    Can we leave a detailed message on this number? YES    Call taken on 4/17/2019 at 10:36 AM by Dalia Rubio

## 2019-04-18 ASSESSMENT — ANXIETY QUESTIONNAIRES
2. NOT BEING ABLE TO STOP OR CONTROL WORRYING: SEVERAL DAYS
IF YOU CHECKED OFF ANY PROBLEMS ON THIS QUESTIONNAIRE, HOW DIFFICULT HAVE THESE PROBLEMS MADE IT FOR YOU TO DO YOUR WORK, TAKE CARE OF THINGS AT HOME, OR GET ALONG WITH OTHER PEOPLE: NOT DIFFICULT AT ALL
6. BECOMING EASILY ANNOYED OR IRRITABLE: NOT AT ALL
3. WORRYING TOO MUCH ABOUT DIFFERENT THINGS: NOT AT ALL
7. FEELING AFRAID AS IF SOMETHING AWFUL MIGHT HAPPEN: NOT AT ALL
5. BEING SO RESTLESS THAT IT IS HARD TO SIT STILL: NOT AT ALL
GAD7 TOTAL SCORE: 2
1. FEELING NERVOUS, ANXIOUS, OR ON EDGE: SEVERAL DAYS

## 2019-04-18 ASSESSMENT — PATIENT HEALTH QUESTIONNAIRE - PHQ9
5. POOR APPETITE OR OVEREATING: NOT AT ALL
SUM OF ALL RESPONSES TO PHQ QUESTIONS 1-9: 3

## 2019-04-18 NOTE — TELEPHONE ENCOUNTER
PHQ9 score today is 3, previously 4 on 12-13-18 and GAD7 score today is 2 previously unknown as questionnaire was not complete, both scores under 5, chart closed.

## 2019-04-19 ASSESSMENT — ANXIETY QUESTIONNAIRES: GAD7 TOTAL SCORE: 2

## 2019-04-22 DIAGNOSIS — F41.1 GAD (GENERALIZED ANXIETY DISORDER): ICD-10-CM

## 2019-04-22 DIAGNOSIS — F33.1 MODERATE EPISODE OF RECURRENT MAJOR DEPRESSIVE DISORDER (H): ICD-10-CM

## 2019-04-22 NOTE — TELEPHONE ENCOUNTER
"Requested Prescriptions   Pending Prescriptions Disp Refills     citalopram (CELEXA) 20 MG tablet [Pharmacy Med Name: CITALOPRAM HBR 20 MG TABLET] 30 tablet 0     Sig: TAKE 1 TABLET BY MOUTH EVERY DAY  Last Written Prescription Date:  03/07/2019  Last Fill Quantity: 30 tablet,  # refills: 0    Last office visit: 3/1/2019 with prescribing provider:  Jagdeep Moore PA-C        Future Office Visit:   Next 5 appointments (look out 90 days)    Apr 24, 2019  8:15 AM CDT  Office Visit with Micha Pruett MD  [facility] ([facility] [address]  [address]  [place] [place] [id number] [phone]                SSRIs Protocol Passed - 4/22/2019 12:31 PM        Passed - PHQ-9 score less than 5 in past 6 months     Please review last PHQ-9 score.   PHQ-9 SCORE 10/8/2018 12/13/2018 4/18/2019   PHQ-9 Total Score 12 4 3     SANDEEP-7 SCORE 10/10/2017 10/8/2018 4/18/2019   Total Score 0 15 2                 Passed - Medication is active on med list        Passed - Patient is age 18 or older        Passed - No active pregnancy on record        Passed - No positive pregnancy test in last 12 months        Passed - Recent (6 mo) or future (30 days) visit within the authorizing provider's specialty     Patient had office visit in the last 6 months or has a visit in the next 30 days with authorizing provider or within the authorizing provider's specialty.  See \"Patient Info\" tab in inbasket, or \"Choose Columns\" in Meds & Orders section of the refill encounter.              "

## 2019-04-24 ENCOUNTER — OFFICE VISIT (OUTPATIENT)
Dept: PEDIATRICS | Facility: CLINIC | Age: 34
End: 2019-04-24
Payer: COMMERCIAL

## 2019-04-24 VITALS
HEART RATE: 70 BPM | OXYGEN SATURATION: 100 % | SYSTOLIC BLOOD PRESSURE: 106 MMHG | BODY MASS INDEX: 28.06 KG/M2 | DIASTOLIC BLOOD PRESSURE: 60 MMHG | WEIGHT: 178.8 LBS | HEIGHT: 67 IN | TEMPERATURE: 98.4 F

## 2019-04-24 DIAGNOSIS — R42 VERTIGO: Primary | ICD-10-CM

## 2019-04-24 PROCEDURE — 99213 OFFICE O/P EST LOW 20 MIN: CPT | Mod: GE | Performed by: STUDENT IN AN ORGANIZED HEALTH CARE EDUCATION/TRAINING PROGRAM

## 2019-04-24 RX ORDER — CITALOPRAM HYDROBROMIDE 20 MG/1
TABLET ORAL
Qty: 90 TABLET | Refills: 1 | Status: SHIPPED | OUTPATIENT
Start: 2019-04-24 | End: 2019-08-20

## 2019-04-24 ASSESSMENT — MIFFLIN-ST. JEOR: SCORE: 1548.66

## 2019-04-24 NOTE — PROGRESS NOTES
SUBJECTIVE:   Gay Cummins is a 34 year old female who presents to clinic today for the following   health issues:      Dizziness      Duration: 2 months     Description   Feeling faint:  YES- sometimes   Feeling like the surroundings are moving: YES- feels like eye are bouncing, spinning, and off balance like rocking on boat   Loss of consciousness or falls: no     Intensity:  Ranges from moderate to severe     Accompanying signs and symptoms:   Nausea/vomitting: no   Palpitations: no, but chest feels heavy   Weakness in arms or legs: YES- generalized weakness   Vision or speech changes: YES- vision- bouncing sensation   Ringing in ears (Tinnitus): no   Hearing loss related to dizziness: no   Other (fevers/chills/sweating/dyspnea): headaches     History (similar episodes/head trauma/previous evaluation/recent bleeding): Discussed with Lakshmi Nathan at physical but never followed but     Precipitating or alleviating factors (new meds/chemicals): comes on in times of high stress and high anxiety but not all the time   Worse with activity/head movement: YES    Therapies tried and outcome: jocelyne Rashid is a 35 yo F w/ hx of SANDEEP and depression who presents to clinic for a dizziness f/u. She was seen in October 2018 for annual physical and mentioned about dizziness. At that time, it was thought to be somewhat associated to her anxiety so Celexa was restarted.  Initially, her symptom of dizziness/vertigo was better; however past couple months, her symptoms have returned and worsening in severity.  She describes as intermittent episodes with varying degree of severity.  Sometimes it is very mild and other times it can be very severe to the point where she is having issues with walking.  Episodes usually starts with what she calls nystagmus (sensation of eyes bouncing) then she feels like she is on a boat.  She also has nausea but no vomiting with these episodes. It generally lasts 5-10 minutes. Last  episode was last night.  She also has been noticing increasing and more frequent headache in the posterior head/neck.  Also has generalized weakness when episodes happen blood no numbness and tingling and the symptoms resolve once the episode goes away.  She has not noticed any positional association or temporal association. Intermittently has chest heaviness but no shortness of breath or actual chest pain. No falls. Some of the symptoms are similar to her anxiety attacks in the past but not completely the same.  However vertigo episodes do occur often with her worsening anxiety. She sees a therapist for anxiety and doing well with the Celexa.     Of note she had an ear infection in March and was treated with omnicef, possibly worse vertigo after this infx.    Additional history: as documented    Reviewed  and updated as needed this visit by clinical staff  Tobacco  Allergies  Meds  Problems  Med Hx  Surg Hx  Fam Hx  Soc Hx          Reviewed and updated as needed this visit by Provider  Tobacco  Allergies  Meds  Problems  Med Hx  Surg Hx  Fam Hx         Patient Active Problem List   Diagnosis     Sacroiliac joint pain     Moderate episode of recurrent major depressive disorder (H)     SANDEEP (generalized anxiety disorder)     Past Surgical History:   Procedure Laterality Date     ADENOIDECTOMY  2009      SECTION N/A 2017    Procedure:  SECTION;  priamry  section ;  Surgeon: Chela Maria DO;  Location: [facility] OR     CHOLECYSTECTOMY        TOOTH EXTRACTION W/FORCEP         Social History     Tobacco Use     Smoking status: Never Smoker     Smokeless tobacco: Never Used   Substance Use Topics     Alcohol use: No     Alcohol/week: 0.0 oz     Comment: not during pregnancy     Family History   Problem Relation Age of Onset     Heart Disease Paternal Grandfather      Breast Cancer Paternal Grandmother      Diabetes Type 2  Maternal Grandfather      Lung Cancer Maternal  "Grandfather         smoker     Thyroid Disease Maternal Grandmother      Cerebrovascular Disease Maternal Grandmother 75     Rheumatoid Arthritis Mother 50     Family History Negative Father      Family History Negative Sister          Current Outpatient Medications   Medication Sig Dispense Refill     citalopram (CELEXA) 20 MG tablet TAKE 1 TABLET BY MOUTH EVERY DAY 30 tablet 0     desogestrel-ethinyl estradiol (APRI) 0.15-30 MG-MCG per tablet Take 1 tablet by mouth daily 84 tablet 3     Multiple Vitamin (MULTI VITAMIN DAILY PO)        No Known Allergies    ROS:  -Please refer to HPI for review of system     OBJECTIVE:     /60 (BP Location: Right arm, Patient Position: Chair, Cuff Size: Adult Regular)   Pulse 70   Temp 98.4  F (36.9  C) (Oral)   Ht 1.702 m (5' 7\")   Wt 81.1 kg (178 lb 12.8 oz)   SpO2 100%   BMI 28.00 kg/m    Body mass index is 28 kg/m .     GENERAL: healthy, alert and no distress  EYES: Eyes grossly normal to inspection, PERRL, extraocular movement intact, and conjunctivae and sclerae normal.  No nystagmus noted  HENT: ear canals and TM's appear gray and translucent with white deposition noted on the lower and external portion of the eardrum, nose and mouth without ulcers or lesions, moist mucous membranes  NECK: no adenopathy  RESP: lungs clear to auscultation - no rales, rhonchi or wheezes  CV: regular rate and rhythm, normal S1 S2, no S3 or S4, no murmur, click or rub, no peripheral edema and peripheral pulses strong  MS: no gross musculoskeletal defects noted  NEURO: Alert, cranial nerves II through XII intact, normal tone, 5 out of 5 strength in upper and lower extremity bilaterally, normal sensation to light touch bilaterally, 2+ patellar reflex bilaterally, gait is normal, mentation intact and speech normal  PSYCH: mentation appears normal, affect normal/bright    Diagnostic Test Results:  none     ASSESSMENT/PLAN:   1. Vertigo  Presenting with 6 mo hx of intermittent vertigo " associated with nausea but no vomiting. No hearing loss or tinnitus. Neurological exam is unremarkable and ear exam does not show possible fluid in the back of the ear and white deposition on the external portion of the TM. Differentials include BPPV, vestibular neuritis, migraine (vestibular), meniere disease, OME, etc. Based on her exam, it does not appear to be central etiology. Diagnoses such a vestibular neuritis or meniere less likely with no severe vomiting or hearing gloss. OME could also contribute to dizziness but her TMs do not appear to have significant effusion and her symptoms were going on prior to recent ear infection. Migraine is high on differential since she has had similar occular symptoms in the past with her occular migraine. Her description of headache does not completely fit well with migraine HA but certainly possible. Since this is going on for >6 months, it would be reasonable to get further evaluation and discussed with pt [facility]. Pt ok with this plan. Also informed her to continue using flonase daily since congestion/eustachian dysfunction can sometimes contribute to dizziness  - NEUROLOGY ADULT REFERRAL    FUTURE APPOINTMENTS:       - Follow-up visit in 6 months    Patient discussed with Dr. Mirela Pruett MD  [facility]    -------------------------------------  Staff note:  I discussed this case in depth with Dr. Pruett and agree with the key components of the history, assessment and plan.    Danita Dietz MD  Internal Medicine/Pediatrics

## 2019-04-24 NOTE — PATIENT INSTRUCTIONS
- will put [facility] [place]; please call to make an appointment  - continue using flonase daily during spring to fall season to help with seasonal allergies and congestion        Patient Education     Dizziness (Uncertain Cause)  Dizziness is a common symptom. It may be described as lightheadedness, spinning, or feeling like you are going to faint. Dizziness can have many causes.  Be sure to tell the healthcare provider about:    All medicines you take, including prescription, over-the-counter, herbs, and supplements    Any other symptoms you have    Any health problems you are being treated for    Any past major health problems you've had, such as a heart attack, balance issues, hearing problems, or blood pressure problems    Anything that causes the dizziness to get worse or better  Today's exam did not show an exact cause for your dizziness. Other tests may be needed. Follow up with your healthcare provider.  Home care    Dizziness that occurs with sudden standing may be a sign of mild dehydration. Drink extra fluids for the next few days.    If you recently started a new medicine, stopped a medicine, or had the dose of a current medicine changed, talk with the prescribing healthcare provider. Your medicine plan may need adjustment.    If dizziness lasts more than a few seconds, sit or lie down until it passes. This may help prevent injury in case you pass out. Get up slowly when you feel better.    Don't drive or use power tools or dangerous equipment until you have had no dizziness for at least 48 hours.  Follow-up care  Follow up with your healthcare provider for further evaluation within the next 7 days or as advised.  When to seek medical advice  Call your healthcare provider for any of the following:    Worsening of symptoms or new symptoms    Passing out or seizure    Repeated vomiting    Headache    Palpitations (the sense that your heart is fluttering or beating fast  or hard)    Shortness of breath    Blood in vomit or stool (black or red color)    Weakness of an arm or leg or 1 side of the face    Vision or hearing changes    Trouble walking or speaking    Chest, arm, neck, back, or jaw pain  Date Last Reviewed: 11/1/2017 2000-2018 [employer]. [address] [place] [address]. All rights reserved. This information is not intended as a substitute for professional medical care. Always follow your healthcare professional's instructions.

## 2019-05-02 ENCOUNTER — TRANSFERRED RECORDS (OUTPATIENT)
Dept: HEALTH INFORMATION MANAGEMENT | Facility: CLINIC | Age: 34
End: 2019-05-02

## 2019-08-20 ENCOUNTER — OFFICE VISIT (OUTPATIENT)
Dept: PEDIATRICS | Facility: CLINIC | Age: 34
End: 2019-08-20
Payer: COMMERCIAL

## 2019-08-20 VITALS
HEART RATE: 74 BPM | TEMPERATURE: 97.4 F | WEIGHT: 180.7 LBS | SYSTOLIC BLOOD PRESSURE: 101 MMHG | OXYGEN SATURATION: 98 % | DIASTOLIC BLOOD PRESSURE: 66 MMHG | BODY MASS INDEX: 28.3 KG/M2

## 2019-08-20 DIAGNOSIS — F41.1 GAD (GENERALIZED ANXIETY DISORDER): ICD-10-CM

## 2019-08-20 DIAGNOSIS — F33.1 MODERATE EPISODE OF RECURRENT MAJOR DEPRESSIVE DISORDER (H): Primary | ICD-10-CM

## 2019-08-20 PROCEDURE — 99213 OFFICE O/P EST LOW 20 MIN: CPT | Performed by: NURSE PRACTITIONER

## 2019-08-20 RX ORDER — HYDROXYZINE HYDROCHLORIDE 25 MG/1
25 TABLET, FILM COATED ORAL 3 TIMES DAILY PRN
Qty: 30 TABLET | Refills: 0 | Status: ON HOLD | OUTPATIENT
Start: 2019-08-20 | End: 2020-04-19

## 2019-08-20 RX ORDER — CITALOPRAM HYDROBROMIDE 20 MG/1
20 TABLET ORAL DAILY
Qty: 90 TABLET | Refills: 1 | Status: SHIPPED | OUTPATIENT
Start: 2019-08-20 | End: 2019-10-11

## 2019-08-20 NOTE — PROGRESS NOTES
Subjective     Gay Cummins is a 34 year old female who presents to clinic today for the following health issues:    HPI   Depression and Anxiety Follow-Up    How are you doing with your depression since your last visit? Improved     How are you doing with your anxiety since your last visit?  Improved but there are some days a few times per month that are very overwhelming, wondering if there is some other medication she can take.    Are you having other symptoms that might be associated with depression or anxiety? No    Have you had a significant life event? No     Do you have any concerns with your use of alcohol or other drugs? No    Social History     Tobacco Use     Smoking status: Never Smoker     Smokeless tobacco: Never Used   Substance Use Topics     Alcohol use: No     Alcohol/week: 0.0 oz     Comment: not during pregnancy     Drug use: No     PHQ 10/8/2018 12/13/2018 4/18/2019   PHQ-9 Total Score 12 4 3   Q9: Thoughts of better off dead/self-harm past 2 weeks Not at all Not at all Not at all     SANDEEP-7 SCORE 10/10/2017 10/8/2018 4/18/2019   Total Score 0 15 2       In the past two weeks have you had thoughts of suicide or self-harm?  No.    Do you have concerns about your personal safety or the safety of others?   No    Suicide Assessment Five-step Evaluation and Treatment (SAFE-T)      How many servings of fruits and vegetables do you eat daily?  2-3    On average, how many sweetened beverages do you drink each day (soda, juice, sweet tea, etc)?   1    How many days per week do you miss taking your medication? 0      She has been on celexa for dpe and anxiety and is followed by therapist. She notes in general, her anxiety is well managed per celexa. There are a couple times per month that she feels an increase in anxiety and wonders about a quick acting med for this. She is working with her therapist and understands her triggers including feeling overwhelmed with plans.     Patient Active Problem List    Diagnosis     Sacroiliac joint pain     Moderate episode of recurrent major depressive disorder (H)     SANDEEP (generalized anxiety disorder)     Past Surgical History:   Procedure Laterality Date     ADENOIDECTOMY  2009      SECTION N/A 2017    Procedure:  SECTION;  priamry  section ;  Surgeon: Chela Maria DO;  Location: [facility] OR     CHOLECYSTECTOMY        TOOTH EXTRACTION W/FORCEP  2000       Social History     Tobacco Use     Smoking status: Never Smoker     Smokeless tobacco: Never Used   Substance Use Topics     Alcohol use: No     Alcohol/week: 0.0 oz     Comment: not during pregnancy     Family History   Problem Relation Age of Onset     Heart Disease Paternal Grandfather      Breast Cancer Paternal Grandmother      Diabetes Type 2  Maternal Grandfather      Lung Cancer Maternal Grandfather         smoker     Thyroid Disease Maternal Grandmother      Cerebrovascular Disease Maternal Grandmother 75     Rheumatoid Arthritis Mother 50     Family History Negative Father      Family History Negative Sister            Reviewed and updated as needed this visit by Provider         Review of Systems   ROS COMP: Constitutional, HEENT, cardiovascular, pulmonary, gi and gu systems are negative, except as otherwise noted.      Objective    /66   Pulse 74   Temp 97.4  F (36.3  C) (Tympanic)   Wt 82 kg (180 lb 11.2 oz)   SpO2 98%   BMI 28.30 kg/m    Body mass index is 28.3 kg/m .  Physical Exam   GENERAL: healthy, alert and no distress  PSYCH: mentation appears normal, affect normal/bright          Assessment & Plan     1. Moderate episode of recurrent major depressive disorder (H)  Stable, no symptoms of depression at this time    2. SANDEEP (generalized anxiety disorder)  Marginally controlled. She notes she does have panic type symptoms about 2 times per month and her therapist asked about PRN medication. We reviewed options including ocntinuing to work with therapist and/o  increasing celexa. She opts to try hydroxyzine for now. She notes she is trying to get pregnant and we discussed her option of coming off serotonin specific reuptake inhibitor or staying on and she will make that decision at a later time. She opts to keep celexa dose the same for now. Will follow up in 2 month.        Return in about 2 months (around 10/20/2019) for Annual Preventative Exam.    [facility]

## 2019-08-20 NOTE — PATIENT INSTRUCTIONS
We will keep the celexa dose the same for now. You can take atarax as needed - let me know if you find yourself using it more than TWICE per month and don't forget to use the techniques you and your therapist have discussed. We talked about increasing celexa dose - let me know if you should change your mind. We talked about stopping celexa at first POS pregnancy test - let me know if you should decide to stop celexa.

## 2019-09-29 ENCOUNTER — HEALTH MAINTENANCE LETTER (OUTPATIENT)
Age: 34
End: 2019-09-29

## 2019-10-11 ENCOUNTER — TELEPHONE (OUTPATIENT)
Dept: OBGYN | Facility: CLINIC | Age: 34
End: 2019-10-11

## 2019-10-11 ENCOUNTER — PRENATAL OFFICE VISIT (OUTPATIENT)
Dept: NURSING | Facility: CLINIC | Age: 34
End: 2019-10-11
Payer: COMMERCIAL

## 2019-10-11 VITALS
BODY MASS INDEX: 28.33 KG/M2 | SYSTOLIC BLOOD PRESSURE: 108 MMHG | HEIGHT: 67 IN | DIASTOLIC BLOOD PRESSURE: 60 MMHG | WEIGHT: 180.5 LBS

## 2019-10-11 DIAGNOSIS — Z34.90 SUPERVISION OF NORMAL PREGNANCY: Primary | ICD-10-CM

## 2019-10-11 DIAGNOSIS — O21.9 PREGNANCY RELATED NAUSEA AND VOMITING, ANTEPARTUM: Primary | ICD-10-CM

## 2019-10-11 LAB
ABO + RH BLD: NORMAL
ABO + RH BLD: NORMAL
BLD GP AB SCN SERPL QL: NORMAL
BLOOD BANK CMNT PATIENT-IMP: NORMAL
ERYTHROCYTE [DISTWIDTH] IN BLOOD BY AUTOMATED COUNT: 12.3 % (ref 10–15)
HCG, QUAL URINE: POSITIVE
HCT VFR BLD AUTO: 39.5 % (ref 35–47)
HGB BLD-MCNC: 13.1 G/DL (ref 11.7–15.7)
MCH RBC QN AUTO: 31.6 PG (ref 26.5–33)
MCHC RBC AUTO-ENTMCNC: 33.2 G/DL (ref 31.5–36.5)
MCV RBC AUTO: 95 FL (ref 78–100)
PLATELET # BLD AUTO: 268 10E9/L (ref 150–450)
RBC # BLD AUTO: 4.14 10E12/L (ref 3.8–5.2)
SPECIMEN EXP DATE BLD: NORMAL
WBC # BLD AUTO: 8.1 10E9/L (ref 4–11)

## 2019-10-11 PROCEDURE — 87086 URINE CULTURE/COLONY COUNT: CPT | Performed by: OBSTETRICS & GYNECOLOGY

## 2019-10-11 PROCEDURE — 86762 RUBELLA ANTIBODY: CPT | Performed by: OBSTETRICS & GYNECOLOGY

## 2019-10-11 PROCEDURE — 86850 RBC ANTIBODY SCREEN: CPT | Performed by: OBSTETRICS & GYNECOLOGY

## 2019-10-11 PROCEDURE — 85027 COMPLETE CBC AUTOMATED: CPT | Performed by: OBSTETRICS & GYNECOLOGY

## 2019-10-11 PROCEDURE — 87591 N.GONORRHOEAE DNA AMP PROB: CPT | Performed by: OBSTETRICS & GYNECOLOGY

## 2019-10-11 PROCEDURE — 86900 BLOOD TYPING SEROLOGIC ABO: CPT | Performed by: OBSTETRICS & GYNECOLOGY

## 2019-10-11 PROCEDURE — 86901 BLOOD TYPING SEROLOGIC RH(D): CPT | Performed by: OBSTETRICS & GYNECOLOGY

## 2019-10-11 PROCEDURE — 99207 ZZC NO CHARGE NURSE ONLY: CPT

## 2019-10-11 PROCEDURE — 87340 HEPATITIS B SURFACE AG IA: CPT | Performed by: OBSTETRICS & GYNECOLOGY

## 2019-10-11 PROCEDURE — 87389 HIV-1 AG W/HIV-1&-2 AB AG IA: CPT | Performed by: OBSTETRICS & GYNECOLOGY

## 2019-10-11 PROCEDURE — 36415 COLL VENOUS BLD VENIPUNCTURE: CPT | Performed by: OBSTETRICS & GYNECOLOGY

## 2019-10-11 PROCEDURE — 84703 CHORIONIC GONADOTROPIN ASSAY: CPT

## 2019-10-11 PROCEDURE — 87491 CHLMYD TRACH DNA AMP PROBE: CPT | Performed by: OBSTETRICS & GYNECOLOGY

## 2019-10-11 PROCEDURE — 86780 TREPONEMA PALLIDUM: CPT | Performed by: OBSTETRICS & GYNECOLOGY

## 2019-10-11 RX ORDER — ONDANSETRON 4 MG/1
4 TABLET, ORALLY DISINTEGRATING ORAL EVERY 8 HOURS PRN
Qty: 20 TABLET | Refills: 1 | Status: SHIPPED | OUTPATIENT
Start: 2019-10-11 | End: 2020-02-27 | Stop reason: ALTCHOICE

## 2019-10-11 RX ORDER — PNV NO.95/FERROUS FUM/FOLIC AC 28MG-0.8MG
TABLET ORAL
COMMUNITY
End: 2021-06-24

## 2019-10-11 ASSESSMENT — MIFFLIN-ST. JEOR: SCORE: 1556.37

## 2019-10-11 NOTE — TELEPHONE ENCOUNTER
Pt here for NPN nurse visit. 7+ weeks pregnant.    C/o nausea and vomiting. OTC products not helping.   Pt is requesting an rx for zofran odt, which worked well for her with her last pregnancy.    Oops, routed to the wrong person.       Mary STEPHENSON RN

## 2019-10-11 NOTE — TELEPHONE ENCOUNTER
Lockett, yes that other one is my sister.  She is pretty good about forwarding them back to me.     OK to give zofran 4mg ODTs #20 R1.    Marlene Lo MD, MPH  [facility]

## 2019-10-11 NOTE — NURSING NOTE
Patient is accompanied by self. Prenatal book and folder (containing standard educational hand-outs and brochures) given to patient. Information in folder reviewed. Questions answered. Brochure given on optional screening available to assess chromosomal anomalies. Pt advised to call the clinic if she has any questions or concerns related to her pregnancy. Prenatal labs obtained. New prenatal visit scheduled on 1122/19 with Dr. Baltazar Carrion.    7w2d    Lab Results   Component Value Date    PAP NIL 10/08/2018           Patient supplied answers from flow sheet for:  Prenatal OB Questionnaire.  Past Medical History  Diabetes?: No  Hypertension : No  Heart disease, mitral valve prolapse or rheumatic fever?: No  An autoimmune disease such as lupus or rheumatoid arthritis?: No  Kidney disease or urinary tract infection?: No  Epilepsy, seizures or spells?: No  Migraine headaches?: (!) Yes  A stroke or loss of function or sensation?: No  Any other neurological problems?: No  Have you ever been treated for depression?: (!) Yes(was previously on celexa for anxiety/depression. stopped when found out she was pregnant)  Are you having problems with crying spells or loss of self-esteem?: (!) Yes  Have you ever required psychiatric care?: (!) Yes(during high school)  Have you ever had hepatitis, liver disease or jaundice?: No  Have you been treated for blood clots in your veins, deep vein thromosis, inflammation in the veins, thrombosis, phlebitis, pulmonary embolism or varicosities?: No  Have you had excessive bleeding after surgery or dental work?: No  Do you bleed more than other women after a cut or scratch?: No  Do you have a history of anemia?: No  Have you ever had thyroid problems or taken thyroid medication?: No   Do you have any endocrine problems?: No  Have you ever been in a major accident or suffered serious trauma?: No  Within the last year, has anyone hit, slapped, kicked or otherwise hurt you?: No  In the last year,  has anyone forced you to have sex when you didn't want to?: No    Past Medical History 2   Have you ever received a blood transfusion?: No  Would you refuse a blood transfusion if a doctor judged it to be medically necessary?: No   If you answered Yes, would you rather die than receive a blood transfusion?: No  If you answered Yes, is this for Sabianist reasons?: No  Does anyone in your home smoke?: No  Do you use tobacco products?: No  Do you drink beer, wine or hard liquor?: No  Do you use any of the following: marijuana, speed, cocaine, heroin, hallucinogens or other drugs?: No   Is your blood type Rh negative?: No  Have you ever had abnormal antibodies in your blood?: No  Have you ever had asthma?: No  Have you ever had tuberculosis?: No  Do you have any allergies to drugs or over-the-counter medications?: No  Allergies: Dust Mites, Aspartame, Ethanol, Venlafaxine, Hydrochloride, Sertraline: No  Have you had any breast problems?: No  Have you ever ?: (!) Yes  Have you had any gynecological surgical procedures such as cervical conization, a LEEP procedure, laser treatment, cryosurgery of the cervix or a dilation and curettage, etc?: No  Have you ever had any other surgical procedures?: (!) Yes  Have you been hospitalized for a nonsurgical reason excluding normal delivery?: No  Have you ever had any anesthetic complications?: No  Have you ever had an abnormal pap smear?: No    Past Medical History (Continued)  Do you have a history of abnormalities of the uterus?: No  Did your mother take PATIENCE or any other hormones when she was pregnant with you?: No  Did it take you more than a year to become pregnant?: No  Have you ever been evaluated or treated for infertility?: No  Is there a history of medical problems in your family, which you feel may be important to this pregnancy?: No  Do you have any other problems we have not asked about which you feel may be important to this pregnancy?: (!) Yes(anxiety.  stopped taking celexa when she found out she was pregnant)    Symptoms since last menstrual period  Do you have any of the following symptoms: abdominal pain, blood in stools or urine, chest pain, shortness of breath, coughing or vomiting up blood, your heart racing or skipping beats, nausea and vomiting, pain on urination or vaginal discharge or bleed: (!) Yes(nausea, vomiting, dizziness, breast tenderness, fatigue)  Current medications, including over-the-counter medications, you are using? (If not applicable answer none): pn vitamins, atarax prn  Will the patient be 35 years old or older at the time of delivery?: No    Has the patient, baby's father or anyone in either family had:  Thalassemia (Italian, Greek, Mediterranean or  background only) and an MCV result less than 80?: No  Neural tube defect such as meningomyelocele, spina bifida or anencephaly?: No  Congenital heart defect?: No  Down's Syndrome?: No  Carlos Manuel-Sachs disease (Christian, Cajun, Turkmen-Nepalese)?: No  Sickle cell disease or trait ()?: No  Hemophilia or other inherited problems of blood?: No  Muscular dystrophy?: No  Cystic fibrosis?: No  Poli's chorea?: No  Mental retardation/autism?: No  If yes, was the person tested for fragile X?: No  Any other inherited genetic or chromosomal disorder?: No  Maternal metabolic disorder (e.g Insulin-dependent diabetes, PKU)?: No  A child with birth defects not listed above?: No  Recurrent pregnancy loss or stillbirth?: No   Has the patient had any medications/street drugs/alcohol since her last menstrual period?: No  Does the patient or baby's father have any other genetic risks?: No    Infection History   Do you object to being tested for Hepatitis B?: No  Do you object to being tested for HIV?: No   Do you feel that you are at high risk for coming in contact with the AIDS virus?: No  Have you ever been treated for tuberculosis?: No  Have you ever had a positive skin test for tuberculosis?:  No  Do you live with someone who has tuberculosis?: No  Have you ever been exposed to tuberculosis?: No  Do you have genital herpes?: No  Does your partner have genital herpes?: No  Have you had a viral illness since your last period?: No  Have you ever had gonorrhea, chlamydia, syphilis, venereal warts, trichomoniasis, pelvic inflammatory disease or any other sexually transmitted disease?: No  Do you know if you are a genital group B streptococcus carrier?: No  Have you had chicken pox/varicella?: (!) Yes   Have you been vaccinated against chicken Pox?: No  Have you had any other infectious diseases?: No    Mary STEPHENSON RN

## 2019-10-12 LAB
BACTERIA SPEC CULT: NO GROWTH
RUBV IGG SERPL IA-ACNC: 10 IU/ML
SPECIMEN SOURCE: NORMAL
T PALLIDUM AB SER QL: NONREACTIVE

## 2019-10-13 LAB
C TRACH DNA SPEC QL NAA+PROBE: NEGATIVE
N GONORRHOEA DNA SPEC QL NAA+PROBE: NEGATIVE
SPECIMEN SOURCE: NORMAL
SPECIMEN SOURCE: NORMAL

## 2019-10-14 LAB
HBV SURFACE AG SERPL QL IA: NONREACTIVE
HIV 1+2 AB+HIV1 P24 AG SERPL QL IA: NONREACTIVE

## 2019-10-18 ENCOUNTER — ANCILLARY PROCEDURE (OUTPATIENT)
Dept: ULTRASOUND IMAGING | Facility: CLINIC | Age: 34
End: 2019-10-18
Payer: COMMERCIAL

## 2019-10-18 DIAGNOSIS — Z34.90 SUPERVISION OF NORMAL PREGNANCY: ICD-10-CM

## 2019-10-18 PROCEDURE — 76801 OB US < 14 WKS SINGLE FETUS: CPT | Performed by: OBSTETRICS & GYNECOLOGY

## 2019-10-18 PROCEDURE — 76802 OB US < 14 WKS ADDL FETUS: CPT | Performed by: OBSTETRICS & GYNECOLOGY

## 2019-10-21 ENCOUNTER — TELEPHONE (OUTPATIENT)
Dept: OBGYN | Facility: CLINIC | Age: 34
End: 2019-10-21

## 2019-10-21 NOTE — TELEPHONE ENCOUNTER
Pt calls feeling a bit anxious since discovering that she is having twins.  Asks about any resources and states she has questions.  We went over some info (that she may see [facility] for anatomy scan, may have more U/s's near the end of her pregnancy etc), mailed her an acog brochure on having twins.    Anything else that you feel would be helpful for her at this point?    Usha MICHEL R.N.  [facility]

## 2019-10-23 NOTE — TELEPHONE ENCOUNTER
Agree with ACOG bulletin on twins being the best medical resource.  Also if she is looking for other social supports there are many online twins groups that she could explore.    Marlene Lo MD, MPH  [facility]

## 2019-10-23 NOTE — TELEPHONE ENCOUNTER
Left detailed VM with Dr Baltazar Lo's message (consent to communicate on file). She is to call back with any other questions or concerns.      Izabel Ocasio RN

## 2019-10-24 ENCOUNTER — OFFICE VISIT (OUTPATIENT)
Dept: FAMILY MEDICINE | Facility: CLINIC | Age: 34
End: 2019-10-24
Payer: COMMERCIAL

## 2019-10-24 VITALS
RESPIRATION RATE: 16 BRPM | WEIGHT: 181 LBS | DIASTOLIC BLOOD PRESSURE: 68 MMHG | OXYGEN SATURATION: 100 % | HEIGHT: 67 IN | SYSTOLIC BLOOD PRESSURE: 128 MMHG | HEART RATE: 74 BPM | BODY MASS INDEX: 28.41 KG/M2 | TEMPERATURE: 98.5 F

## 2019-10-24 DIAGNOSIS — J20.9 ACUTE BRONCHITIS, UNSPECIFIED ORGANISM: Primary | ICD-10-CM

## 2019-10-24 PROCEDURE — 99213 OFFICE O/P EST LOW 20 MIN: CPT | Performed by: PHYSICIAN ASSISTANT

## 2019-10-24 RX ORDER — AZITHROMYCIN 250 MG/1
TABLET, FILM COATED ORAL
Qty: 6 TABLET | Refills: 0 | Status: SHIPPED | OUTPATIENT
Start: 2019-10-24 | End: 2019-11-01

## 2019-10-24 ASSESSMENT — MIFFLIN-ST. JEOR: SCORE: 1558.64

## 2019-10-24 NOTE — PROGRESS NOTES
Subjective     Gay Cummins is a 34 year old female who presents to clinic today for the following health issues:    HPI   Acute Illness   Acute illness concerns: Cough and congestion   Onset: 1.5 weeks    Fever: no     Chills/Sweats: no     Headache (location?): YES    Sinus Pressure: no    Conjunctivitis:  no    Ear Pain: no    Rhinorrhea: no    Congestion: YES    Sore Throat: no      Cough: YES-productive of yellow sputum    Wheeze: no- chest tightness     Decreased Appetite: no     Nausea: YES pt is pregnancy     Vomiting: YES- post-tussive vomiting    Diarrhea:  no     Dysuria/Freq.: no     Fatigue/Achiness: YES    Sick/Strep Exposure: no      Therapies Tried and outcome: honey cough syrup         Patient Active Problem List   Diagnosis     Sacroiliac joint pain     Moderate episode of recurrent major depressive disorder (H)     SANDEEP (generalized anxiety disorder)     Past Surgical History:   Procedure Laterality Date     ADENOIDECTOMY  2009      SECTION N/A 2017    Procedure:  SECTION;  priamry  section ;  Surgeon: Chela Maria DO;  Location: [facility] OR     CHOLECYSTECTOMY        TOOTH EXTRACTION W/FORCEP         Social History     Tobacco Use     Smoking status: Never Smoker     Smokeless tobacco: Never Used   Substance Use Topics     Alcohol use: Not Currently     Alcohol/week: 0.0 standard drinks     Family History   Problem Relation Age of Onset     Heart Disease Paternal Grandfather      Breast Cancer Paternal Grandmother      Diabetes Type 2  Maternal Grandfather      Lung Cancer Maternal Grandfather         smoker     Thyroid Disease Maternal Grandmother      Cerebrovascular Disease Maternal Grandmother 75     Rheumatoid Arthritis Mother 50     Family History Negative Father          Current Outpatient Medications   Medication Sig Dispense Refill     azithromycin (ZITHROMAX) 250 MG tablet Take 2 tablets (500 mg) by mouth daily for 1 day, THEN 1 tablet (250 mg)  "daily for 4 days. 6 tablet 0     hydrOXYzine (ATARAX) 25 MG tablet Take 1 tablet (25 mg) by mouth 3 times daily as needed for anxiety 30 tablet 0     ondansetron (ZOFRAN-ODT) 4 MG ODT tab Take 1 tablet (4 mg) by mouth every 8 hours as needed for nausea 20 tablet 1     Prenatal Vit-Fe Fumarate-FA (PRENATAL VITAMIN) 27-0.8 MG TABS        sertraline (ZOLOFT) 50 MG tablet Take 1 tablet (50 mg) by mouth daily 30 tablet 0     No Known Allergies      Reviewed and updated as needed this visit by Provider         Review of Systems   ROS COMP: Constitutional, HEENT, cardiovascular, pulmonary, gi and gu systems are negative, except as otherwise noted.      Objective    /68 (BP Location: Right arm, Patient Position: Chair, Cuff Size: Adult Regular)   Pulse 74   Temp 98.5  F (36.9  C) (Oral)   Resp 16   Ht 1.702 m (5' 7\")   Wt 82.1 kg (181 lb)   LMP  (LMP Unknown)   SpO2 100%   BMI 28.35 kg/m    Body mass index is 28.35 kg/m .         Physical Exam   GENERAL: healthy, alert and no distress  EYES: Eyes grossly normal to inspection, PERRL and conjunctivae and sclerae normal  HENT: ear canals and TM's normal, nose and mouth without ulcers or lesions  RESP: lungs clear to auscultation - no rales, rhonchi or wheezes  CV: regular rate and rhythm, normal S1 S2, no S3 or S4, no murmur, click or rub, no peripheral edema and peripheral pulses strong  MS: no gross musculoskeletal defects noted, no edema  SKIN: no suspicious lesions or rashes  NEURO: Normal strength and tone, mentation intact and speech normal  PSYCH: mentation appears normal, affect normal/bright  LYMPH: no cervical, supraclavicular, axillary, or inguinal adenopathy    Diagnostic Test Results:  none         Assessment & Plan     (J20.9) Acute bronchitis, unspecified organism  (primary encounter diagnosis)    Comment: Treat with zithromax to cover for bacterial causes. May still be viral.    Plan: azithromycin (ZITHROMAX) 250 MG tablet              Patient " Instructions   Take the complete course of the antibiotic.       No follow-ups on file.    Dov Fung PA-C  [facility]

## 2019-10-28 ENCOUNTER — HEALTH MAINTENANCE LETTER (OUTPATIENT)
Age: 34
End: 2019-10-28

## 2019-11-01 ENCOUNTER — TRANSCRIBE ORDERS (OUTPATIENT)
Dept: MATERNAL FETAL MEDICINE | Facility: CLINIC | Age: 34
End: 2019-11-01

## 2019-11-01 ENCOUNTER — TELEPHONE (OUTPATIENT)
Dept: OBGYN | Facility: CLINIC | Age: 34
End: 2019-11-01

## 2019-11-01 ENCOUNTER — PRENATAL OFFICE VISIT (OUTPATIENT)
Dept: OBGYN | Facility: CLINIC | Age: 34
End: 2019-11-01
Payer: COMMERCIAL

## 2019-11-01 VITALS — DIASTOLIC BLOOD PRESSURE: 52 MMHG | SYSTOLIC BLOOD PRESSURE: 104 MMHG | BODY MASS INDEX: 28.35 KG/M2 | WEIGHT: 181 LBS

## 2019-11-01 DIAGNOSIS — O30.049 DICHORIONIC DIAMNIOTIC TWIN PREGNANCY, ANTEPARTUM: Primary | ICD-10-CM

## 2019-11-01 DIAGNOSIS — O26.90 PREGNANCY RELATED CONDITION, ANTEPARTUM: Primary | ICD-10-CM

## 2019-11-01 DIAGNOSIS — Z98.891 HISTORY OF CESAREAN DELIVERY: ICD-10-CM

## 2019-11-01 LAB
ALT SERPL W P-5'-P-CCNC: 19 U/L (ref 0–50)
AST SERPL W P-5'-P-CCNC: 8 U/L (ref 0–45)
CREAT SERPL-MCNC: 0.59 MG/DL (ref 0.52–1.04)
GFR SERPL CREATININE-BSD FRML MDRD: >90 ML/MIN/{1.73_M2}

## 2019-11-01 PROCEDURE — 84450 TRANSFERASE (AST) (SGOT): CPT | Performed by: OBSTETRICS & GYNECOLOGY

## 2019-11-01 PROCEDURE — 84460 ALANINE AMINO (ALT) (SGPT): CPT | Performed by: OBSTETRICS & GYNECOLOGY

## 2019-11-01 PROCEDURE — 99207 ZZC FIRST OB VISIT: CPT | Performed by: OBSTETRICS & GYNECOLOGY

## 2019-11-01 PROCEDURE — 36415 COLL VENOUS BLD VENIPUNCTURE: CPT | Performed by: OBSTETRICS & GYNECOLOGY

## 2019-11-01 PROCEDURE — 82565 ASSAY OF CREATININE: CPT | Performed by: OBSTETRICS & GYNECOLOGY

## 2019-11-01 PROCEDURE — 84156 ASSAY OF PROTEIN URINE: CPT | Performed by: OBSTETRICS & GYNECOLOGY

## 2019-11-01 NOTE — TELEPHONE ENCOUNTER
Requesting quantity override PA for the pt's Zofran rx. Has tried Unisom, Vitamin B6, and other OTC remedies without relief. Zofran is working very well for her n/v in pregnancy. Currently her insurance only covers 18 pills in 28 days, but the pt is taking this rx every 8 hours for nausea.        Izabel Ocasio RN

## 2019-11-01 NOTE — TELEPHONE ENCOUNTER
Central Prior Authorization Team   Phone: [phone]    PA Initiation    Medication: ondansetron ODT 4mg  Insurance Company: [employer] Phone [phone] Fax [phone]  Pharmacy Filling the Rx: [id number] - [place], [place] - [address]  Filling Pharmacy Phone: [phone]  Filling Pharmacy Fax: [phone]  Start Date: 11/1/2019    RK FIGUEROA (Key: [id number]) - [id number]

## 2019-11-01 NOTE — PROGRESS NOTES
Had Izabel push a prior auth through for the Zofran, apparently pharmacies will not allow more that 18 tablets in 28 day period. I called patient and informed that we are trying to fix this.   Gema Galeas, CMA

## 2019-11-01 NOTE — PROGRESS NOTES
Gay is a 34 year old  at 10w2d with di-di twins here for new ob visit.      Planned pregnancy, got pregnant the second cycle  + nausea, has done sea bands, ginger chews, and prn zofran.  Needs more zofran.  Has not really tried unisom/B6 much  H/o dep/anx - went off celexa in early pregnancy, got ordered to start back on 50mg sertraline, but didn't start it yet  H/o CS x1     OB History    Para Term  AB Living   2 1 1 0 0 1   SAB TAB Ectopic Multiple Live Births   0 0 0 0 1      # Outcome Date GA Lbr Waqas/2nd Weight Sex Delivery Anes PTL Lv   2 Current            1 Term 17 39w5d  3.31 kg (7 lb 4.8 oz) F CS-LTranv EPI N ANGELA      Name: Chayito      Apgar1: 9  Apgar5: 9       ROS: Ten point review of systems was reviewed and negative except the above.    Past Medical History:   Diagnosis Date     Anxiety     in High school     Depression     in High school     Past Surgical History:   Procedure Laterality Date     ADENOIDECTOMY  2009      SECTION N/A 2017    Procedure:  SECTION;  priamry  section ;  Surgeon: Chela Maria DO;  Location: [facility] OR     CHOLECYSTECTOMY       HC TOOTH EXTRACTION W/FORCEP       Patient Active Problem List    Diagnosis Date Noted     Dichorionic diamniotic twin pregnancy, antepartum 2019     Priority: Medium     Moderate episode of recurrent major depressive disorder (H) 2017     Priority: Medium     SANDEEP (generalized anxiety disorder) 2017     Priority: Medium     Sacroiliac joint pain 2017     Priority: Medium      No Known Allergies  hydrOXYzine (ATARAX) 25 MG tablet, Take 1 tablet (25 mg) by mouth 3 times daily as needed for anxiety  ondansetron (ZOFRAN-ODT) 4 MG ODT tab, Take 1 tablet (4 mg) by mouth every 8 hours as needed for nausea  Prenatal Vit-Fe Fumarate-FA (PRENATAL VITAMIN) 27-0.8 MG TABS,   sertraline (ZOLOFT) 50 MG tablet, Take 1 tablet (50 mg) by mouth daily    No current  facility-administered medications on file prior to visit.       Physical Exam:   /52 (BP Location: Right arm, Patient Position: Sitting, Cuff Size: Adult Regular)   Wt 82.1 kg (181 lb)   LMP  (LMP Unknown)   BMI 28.35 kg/m      Gen:  no acute distress, comfortable, smiling  HENT: No scleral injection or icterus  CV: Regular rate and rhythm, no m/g/r  Resp: Normal work of breathing, no cough  GI: Abdomen soft, non-tender. No masses, organomegaly  Skin: No suspicious lesions or rashes  Psychiatric: mentation appears normal and affect bright    FH cwd  BSUS: FHT x2, grossly normal movement x2    A/P 34 year old  at 10w2d with di-di twins US here for NOB visit.  - Discussed physician coverage, tertiary support, diet, exercise, weight gain, schedule of visits, routine and indicated ultrasounds, and childbirth education.   - Options for  testing for chromosomal and birth defects were discussed with the patient including nuchal lucency/blood marker testing in the first trimester and quad screening and/or Level 2 ultrasound in the second trimester. We discussed that these are screening tests and not diagnostic tests and that false positives and negatives are a distinct possibility. We discussed that follow up diagnostic testing would include chorionic villus sampling or amniocentesis depending on gestational age. - she desires early screening and discussed GC appt as well due to di-di twin pregnancy  - Di-di twin pregnancy: recommend PNV x2 in first trimester, discussed weight gain, discussed increased  screening including growth US and likelihood of early delivery, recommend baseline HELLP labs today and baby ASA for higher risk of pre-eclampsia  - discussed meds and palliative measures for nausea  - H/o dep/anx - went off celexa in early pregnancy, got ordered to start back on 50mg sertraline, but didn't start it yet.    Risks, benefits and potential side effects (including sexual  dysfunction and suicidal thoughs) of antidepressant/antianxiety medication reviewed with patient.  -H/o CS x1 - desires RLTCS and BTL  - s/p flu shot already this year    RTC 4 weeks    Marlene Lo MD, MPH  [facility]

## 2019-11-01 NOTE — NURSING NOTE
"Chief Complaint   Patient presents with     Prenatal Care     10 weeks 2 days- N&V       Initial /52 (BP Location: Right arm, Patient Position: Sitting, Cuff Size: Adult Regular)   Wt 82.1 kg (181 lb)   LMP  (LMP Unknown)   BMI 28.35 kg/m   Estimated body mass index is 28.35 kg/m  as calculated from the following:    Height as of 10/24/19: 1.702 m (5' 7\").    Weight as of this encounter: 82.1 kg (181 lb).  BP completed using cuff size: regular    Questioned patient about current smoking habits.  Pt. has never smoked.          The following HM Due: NONE    10w2d  Dov Trinidad CMA                "

## 2019-11-01 NOTE — TELEPHONE ENCOUNTER
Central Prior Authorization Team   Phone: [phone]    Prior Authorization Approval    Authorization Effective Date: 11/1/2019  Authorization Expiration Date: 4/29/2020  Medication: ondansetron ODT 4mg  Approved Dose/Quantity: 90 per 30 days  Reference #: [id number]   Insurance Company: [employer] - Phone [phone] Fax [phone]  Expected CoPay:       CoPay Card Available:      Foundation Assistance Needed:    Which Pharmacy is filling the prescription (Not needed for infusion/clinic administered): [id number] - [place], [place] - [address]  Pharmacy Notified: Yes  Patient Notified: Yes    PA has been approved for 90 tablets per 30 days. Please send new Rx to the pharmacy.

## 2019-11-02 LAB
CREAT UR-MCNC: 145 MG/DL
PROT UR-MCNC: 0.12 G/L
PROT/CREAT 24H UR: 0.09 G/G CR (ref 0–0.2)

## 2019-11-04 ENCOUNTER — PRE VISIT (OUTPATIENT)
Dept: MATERNAL FETAL MEDICINE | Facility: CLINIC | Age: 34
End: 2019-11-04

## 2019-11-09 DIAGNOSIS — F33.1 MODERATE EPISODE OF RECURRENT MAJOR DEPRESSIVE DISORDER (H): ICD-10-CM

## 2019-11-09 NOTE — TELEPHONE ENCOUNTER
"Requested Prescriptions   Pending Prescriptions Disp Refills     sertraline (ZOLOFT) 50 MG tablet [Pharmacy Med Name: SERTRALINE HCL 50 MG TABLET]    Last Written Prescription Date:  10/16/19  Last Fill Quantity: 30 tablet,  # refills: 0   Last office visit: 8/20/2019 with prescribing provider:  Jac     Future Office Visit:   Next 5 appointments (look out 90 days)    Dec 03, 2019 10:30 AM CST  [facility]ABL[facility] PR[facility] Marlene Lo MD  [facility] [address]  [address] [place] [id number]  [phone]   Dec 31, 2019 10:30 AM CST  ESTABLISHED PRENATAL with Marlene Lo MD  [facility] ([facility]) [address] [place] [address]  [phone]          30 tablet 0     Sig: TAKE 1 TABLET BY MOUTH EVERY DAY       SSRIs Protocol Failed - 11/9/2019 11:34 AM        Failed - PHQ-9 score less than 5 in past 6 months     Please review last PHQ-9 score.   PHQ-9 SCORE 10/8/2018 12/13/2018 4/18/2019   PHQ-9 Total Score 12 4 3     SANDEEP-7 SCORE 10/10/2017 10/8/2018 4/18/2019   Total Score 0 15 2                 Failed - No active pregnancy on record        Passed - Medication is active on med list        Passed - Patient is age 18 or older        Passed - No positive pregnancy test in last 12 months        Passed - Recent (6 mo) or future (30 days) visit within the authorizing provider's specialty     Patient had office visit in the last 6 months or has a visit in the next 30 days with authorizing provider or within the authorizing provider's specialty.  See \"Patient Info\" tab in inbasket, or \"Choose Columns\" in Meds & Orders section of the refill encounter.            "

## 2019-11-11 ENCOUNTER — OFFICE VISIT (OUTPATIENT)
Dept: MATERNAL FETAL MEDICINE | Facility: CLINIC | Age: 34
End: 2019-11-11
Attending: OBSTETRICS & GYNECOLOGY
Payer: COMMERCIAL

## 2019-11-11 ENCOUNTER — HOSPITAL ENCOUNTER (OUTPATIENT)
Dept: LAB | Facility: CLINIC | Age: 34
End: 2019-11-11
Attending: OBSTETRICS & GYNECOLOGY
Payer: COMMERCIAL

## 2019-11-11 ENCOUNTER — HOSPITAL ENCOUNTER (OUTPATIENT)
Dept: ULTRASOUND IMAGING | Facility: CLINIC | Age: 34
Discharge: HOME | End: 2019-11-11
Attending: OBSTETRICS & GYNECOLOGY | Admitting: OBSTETRICS & GYNECOLOGY
Payer: COMMERCIAL

## 2019-11-11 DIAGNOSIS — Z36.89 FIRST TRIMESTER SCREENING FOR TWIN PREGNANCY: Primary | ICD-10-CM

## 2019-11-11 DIAGNOSIS — O30.049 DICHORIONIC DIAMNIOTIC TWIN PREGNANCY, ANTEPARTUM: Primary | ICD-10-CM

## 2019-11-11 DIAGNOSIS — O26.90 PREGNANCY RELATED CONDITION, ANTEPARTUM: ICD-10-CM

## 2019-11-11 DIAGNOSIS — Z36.89 FIRST TRIMESTER SCREENING FOR TWIN PREGNANCY: ICD-10-CM

## 2019-11-11 DIAGNOSIS — O30.009 FIRST TRIMESTER SCREENING FOR TWIN PREGNANCY: Primary | ICD-10-CM

## 2019-11-11 DIAGNOSIS — O30.009 FIRST TRIMESTER SCREENING FOR TWIN PREGNANCY: ICD-10-CM

## 2019-11-11 PROCEDURE — 82105 ALPHA-FETOPROTEIN SERUM: CPT | Performed by: OBSTETRICS & GYNECOLOGY

## 2019-11-11 PROCEDURE — 36415 COLL VENOUS BLD VENIPUNCTURE: CPT | Performed by: OBSTETRICS & GYNECOLOGY

## 2019-11-11 PROCEDURE — 76801 OB US < 14 WKS SINGLE FETUS: CPT

## 2019-11-11 PROCEDURE — 84163 PAPPA SERUM: CPT | Performed by: OBSTETRICS & GYNECOLOGY

## 2019-11-11 PROCEDURE — 84704 HCG FREE BETACHAIN TEST: CPT | Performed by: OBSTETRICS & GYNECOLOGY

## 2019-11-11 PROCEDURE — 96040 ZZH GENETIC COUNSELING, EACH 30 MINUTES: CPT | Mod: ZF | Performed by: GENETIC COUNSELOR, MS

## 2019-11-11 NOTE — TELEPHONE ENCOUNTER
Routing refill request to provider for review/approval because:    PHQ-9 score not  less than 5 in past 6 months      Apple Tripp RN

## 2019-11-11 NOTE — PROGRESS NOTES
[facility] [place]  Genetic Counseling Consult    Patient: Gay Cummins YOB: 1985   Date of Service: 19      Gay Cummins was seen at [facility] [place] for genetic consultation to discuss the options for routine screening for fetal chromosome abnormalities. She was accompanied to today's visit by her partner, Faisal.      Impression/Plan:   1.  Gay had an ultrasound and blood draw for first trimester screening for her dichorionic diamniotic twins.  Results are expected within 5-7 days, and will be available in [facility].  We will contact her to discuss the results, and a copy will be forwarded to the office of the referring OB provider. She requested a detailed voice message be left at [phone] if she is unavailable.    2. Maternal serum AFP (single marker screen) is recommended after 15 weeks to screen for open neural tube defects. A quad screen should not be performed.    Pregnancy History:   /Parity:    Age at Delivery: 35 year old  ARYA: 2020, by Est. Date of Conception  Gestational Age: 11w5d    No significant complications or exposures were reported in the current pregnancy.    This pregnancy is dichorionic diamniotic twins.    Gay s pregnancy history is significant for:  o 2017: 39+5, elective , female ([place])    Medical History:   Gay childs reported medical history is not expected to impact pregnancy management or risks to fetal development.       Family History:   A three-generation pedigree was obtained in her prior pregnancy, was reviewed today, and is scanned under the  Media  tab.   Gay denies any known family history of multiple miscarriages, stillbirths, birth defects, mental retardation, known genetic conditions, and consanguinity.       Carrier Screening:   The patient reports that she and the father of the pregnancy have  ancestry:      Cystic fibrosis is an autosomal  recessive genetic condition that occurs with increased frequency in individuals of  ancestry and carrier screening for this condition is available.  In addition,  screening in the [facility] includes cystic fibrosis.      Expanded carrier screening for mutations in a large panel of genes associated with autosomal recessive conditions including cystic fibrosis, spinal muscular atrophy, and others, is now available.      The patient has declined the carrier screening options reviewed today.       Risk Assessment for Chromosome Conditions:   We explained that the risk for fetal chromosome abnormalities increases with maternal age. We discussed specific features of common chromosome abnormalities, including Down syndrome, trisomy 13, trisomy 18, and sex chromosome trisomies.      - At age 35 at midtrimester, the risk to have a baby with Down syndrome is 1 in 342.     - At age 35 at midtrimester, the risk to have a baby with any chromosome abnormality is 1 in  172.     The majority of di/di twins are dizygotic, meaning the above risk estimates would be per fetus. Up to 20% of di/di twins are thought to be monozygotic, meaning the above risk estimates would be for the pregnancy as a whole.        Testing Options:   We discussed the following options:   First trimester screening    First trimester ultrasound with nuchal translucency and nasal bone assessments, maternal plasma hCG, PETER-A, and AFP measurement    Screens for fetal trisomy 21, trisomy 13, and trisomy 18    Cannot screen for open neural tube defects; maternal serum AFP after 15 weeks is recommended     Non-invasive Prenatal Testing (NIPT)    Maternal plasma cell-free DNA testing; first trimester ultrasound with nuchal translucency and nasal bone assessment is recommended, when appropriate    Screens for fetal trisomy 21, trisomy 13, trisomy 18, and sex chromosome aneuploidy    Cannot screen for open neural tube defects; maternal serum  AFP after 15 weeks is recommended     Comprehensive (Level II) ultrasound: Detailed ultrasound performed between 18-22 weeks gestation to screen for major birth defects and markers for aneuploidy.      We reviewed the benefits and limitations of this testing.  Screening tests provide a risk assessment specific to the pregnancy for certain fetal chromosome abnormalities, but cannot definitively diagnose or exclude a fetal chromosome abnormality.  Follow-up genetic counseling and consideration of diagnostic testing is recommended with any abnormal screening result.      It was a pleasure to be involved with Gay s care. Face-to-face time of the meeting was 25 minutes.    Conchita Yoon MS, [facility]  Maternal Fetal Medicine  [facility]  Ph: [phone]  [email or web address]

## 2019-11-11 NOTE — PROGRESS NOTES
Please see full imaging report from [employer] program under imaging tab.    Christiano Brothers MD  Maternal Fetal Medicine

## 2019-11-15 ENCOUNTER — TELEPHONE (OUTPATIENT)
Dept: MATERNAL FETAL MEDICINE | Facility: CLINIC | Age: 34
End: 2019-11-15

## 2019-11-15 LAB
LAB SCANNED RESULT: NORMAL
LAB SCANNED RESULT: NORMAL

## 2019-11-15 NOTE — TELEPHONE ENCOUNTER
I spoke with Gay today regarding her normal first trimester screen results for her di/di twins. The chance of these chromosome abnormalities after screening is as follows:        Twin A    Twin B  Down syndrome  Less than 1 in 10,000  Less than 1 in 10,000  Trisomy 18/13   8,037    Less than 1 in 10,000    This result significantly reduces the chance either fetus has Down syndrome, trisomy 18, or trisomy 13.     We discussed that these results are very reassuring, but there remains a small chance for a false positive or false negative result. This screen also does not address all chromosome abnormalities or all causes of birth defects and cognitive delay. As such, Gay will still have the option of the Innatal screen and/or diagnostic testing (CVS or amniocentesis) if she is interested or there is an indication later in the pregnancy. She also has the option of having a maternal serum AFP blood draw after 15 weeks to screen for ONTD; she is encouraged to discuss this option with her primary OB provider. Gay is also scheduled to return for a comprehensive ultrasound on 12/20.     All of Gay's questions were answered to her satisfaction and I encouraged her to contact me if she has any questions in the future. A copy of this note and her first trimester screen results will be forwarded to her primary OB provider.    Conchita Yoon MS, [facility]  Maternal Fetal Medicine  [facility]  Ph: [phone]  [email or web address]

## 2019-11-21 RX ORDER — ONDANSETRON 4 MG/1
4 TABLET, ORALLY DISINTEGRATING ORAL EVERY 8 HOURS PRN
Qty: 20 TABLET | Refills: 1 | Status: CANCELLED | OUTPATIENT
Start: 2019-11-21

## 2019-11-21 RX ORDER — HYDROXYZINE HYDROCHLORIDE 25 MG/1
25 TABLET, FILM COATED ORAL 3 TIMES DAILY PRN
Qty: 30 TABLET | Refills: 0 | Status: CANCELLED | OUTPATIENT
Start: 2019-11-21

## 2019-11-21 NOTE — PROGRESS NOTES
NO ANSWER ON PHONE  Disregard PVP notes>>>  Medications:  Outpatient Medications    hydrOXYzine (ATARAX) 25 MG tablet 25 mg, 3 TIMES DAILY PRN    ondansetron (ZOFRAN-ODT) 4 MG ODT tab 4 mg, EVERY 8 HOURS PRN    Prenatal Vit-Fe Fumarate-FA (PRENATAL VITAMIN) 27-0.8 MG TABS     sertraline (ZOLOFT) 50 MG tablet TAKE 1 TABLET BY MOUTH EVERY DAY   Preferred pharmacy:         1. Moderate episode of recurrent major depressive disorder (H)  Stable, no symptoms of depression at this time  -PHQ-9       2. SANDEEP (generalized anxiety disorder)  Marginally controlled. She notes she does have panic type symptoms about 2 times per month and her therapist asked about PRN medication. We reviewed options including ocntinuing to work with therapist and/o increasing celexa. She opts to try hydroxyzine for now. She notes she is trying to get pregnant and we discussed her option of coming off serotonin specific reuptake inhibitor or staying on and she will make that decision at a later time. She opts to keep celexa dose the same for now. Will follow up in 2 month.  -SANDEEP-7    3. Dizziness  She reports dizziness, which is new for her. She wonders if it's related to decreasing caffiene recently OR her mood and anxiety. Will draw labs today to rule this out, however will continue to monitor with restarting celexa to see if it resolves.   - TSH with free T4 reflex  - CBC with platelets  - Comprehensive metabolic panel  - Vitamin D Deficiency    4. Encounter for initial prescription of contraceptive pills  Discussed OCP, discussed increased risk for HTN and clot and instructed to contact me if she exhibits worrisome symptoms, which were discussed.  Encouraged to occasionally monitor BP and contact me if >140/90.  Informed patient OCP does not protect against STI transmission, and the need to use condoms.  - desogestrel-ethinyl estradiol (APRI) 0.15-30 MG-MCG per tablet; Take 1 tablet     5. Screening for malignant neoplasm of cervix     - Pap  imaged thin layer screen with HPV - recommended age 30 - 65  - HPV High Risk Types DNA Cervical  Per last pap (on 10/08/18), pt due for pap in October 2023.    6. Hemorrhoids with prolapsed tissue that cannot be manually replaced   - COLORECTAL SURGERY REFERRAL

## 2019-11-21 NOTE — PROGRESS NOTES
SUBJECTIVE:   CC: Gay Cummins is an 34 year old woman who presents for depression follow up.     Depression and Anxiety Follow-Up    How are you doing with your depression since your last visit? No change    How are you doing with your anxiety since your last visit?  No change    Are you having other symptoms that might be associated with depression or anxiety? No    Have you had a significant life event? OTHER: Pregnant with twins, due 5-29-20.     Do you have any concerns with your use of alcohol or other drugs? No    Social History     Tobacco Use     Smoking status: Never Smoker     Smokeless tobacco: Never Used   Substance Use Topics     Alcohol use: Not Currently     Alcohol/week: 0.0 standard drinks     Frequency: Never     Drinks per session: Patient refused     Binge frequency: Never     Drug use: No     PHQ 12/13/2018 4/18/2019 11/22/2019   PHQ-9 Total Score 4 3 2   Q9: Thoughts of better off dead/self-harm past 2 weeks Not at all Not at all Not at all     SANDEEP-7 SCORE 10/8/2018 4/18/2019 11/22/2019   Total Score 15 2 1     She is 13 wks pregnant, is holding steady on zoloft. She is seeing therapist.     She has had nausea during pregnancy, using zofran.     Suicide Assessment Five-step Evaluation and Treatment (SAFE-T)      Today's PHQ-2 Score:   PHQ-2 ( 1999 Pf[employer]) 11/22/2019   Q1: Little interest or pleasure in doing things 0   Q2: Feeling down, depressed or hopeless 0   PHQ-2 Score 0   Q1: Little interest or pleasure in doing things Not at all   Q2: Feeling down, depressed or hopeless Not at all   PHQ-2 Score 0       Abuse: Current or Past(Physical, Sexual or Emotional)- No  Do you feel safe in your environment? Yes        Social History     Tobacco Use     Smoking status: Never Smoker     Smokeless tobacco: Never Used   Substance Use Topics     Alcohol use: Not Currently     Alcohol/week: 0.0 standard drinks     Frequency: Never     Drinks per session: Patient refused     Binge frequency: Never  "      Alcohol Use 11/22/2019   Prescreen: >3 drinks/day or >7 drinks/week? No   Prescreen: >3 drinks/day or >7 drinks/week? -       Reviewed orders with patient.  Reviewed health maintenance and updated orders accordingly - Yes  Lab work is in process  Reviewed and updated as needed this visit by clinical staff  Tobacco  Allergies  Med Hx  Surg Hx  Fam Hx  Soc Hx        Reviewed and updated as needed this visit by Provider          Review of Systems   Constitutional: Negative for chills and fever.   HENT: Negative for congestion, ear pain, hearing loss and sore throat.    Eyes: Negative for pain and visual disturbance.   Respiratory: Positive for shortness of breath. Negative for cough.    Cardiovascular: Negative for chest pain, palpitations and peripheral edema.   Gastrointestinal: Positive for nausea. Negative for abdominal pain, constipation, diarrhea, heartburn and hematochezia.   Breasts:  Positive for tenderness. Negative for breast mass and discharge.   Genitourinary: Negative for dysuria, frequency, genital sores, hematuria, pelvic pain, urgency, vaginal bleeding and vaginal discharge.   Musculoskeletal: Negative for arthralgias, joint swelling and myalgias.   Skin: Negative for rash.   Neurological: Positive for dizziness and weakness. Negative for headaches and paresthesias.   Psychiatric/Behavioral: Negative for mood changes. The patient is nervous/anxious.         OBJECTIVE:   /67   Pulse 76   Temp 96.2  F (35.7  C) (Tympanic)   Resp 16   Ht 1.702 m (5' 7\")   Wt 82.9 kg (182 lb 12.8 oz)   LMP  (LMP Unknown)   SpO2 99%   BMI 28.63 kg/m    Physical Exam  GENERAL: healthy, alert and no distress  PSYCH: mentation appears normal, affect normal/bright      ASSESSMENT/PLAN:   1. SANDEEP (generalized anxiety disorder)  Stable on current regime    2. Pregnancy related nausea and vomiting, antepartum  conitnue with OBGYN    3. Moderate episode of recurrent major depressive disorder (H)  Stable, she " does have a history of PPD and anxiety and is seeing therapist. Encouraged to contiue seeing therpaist. She has considerable stress regarding twin pregnancy and how breatsfeeding will go and how she will alter worko schedule to afford expenses. Encouraged she schedule with me right after having babies to discuss lactation. Could consider increasing zoloft dose after delivery.   - sertraline (ZOLOFT) 50 MG tablet; Take 1 tablet (50 mg) by mouth daily  Dispense: 90 tablet; Refill: 1    TED [facility]

## 2019-11-22 ENCOUNTER — OFFICE VISIT (OUTPATIENT)
Dept: PEDIATRICS | Facility: CLINIC | Age: 34
End: 2019-11-22
Payer: COMMERCIAL

## 2019-11-22 VITALS
OXYGEN SATURATION: 99 % | HEART RATE: 76 BPM | BODY MASS INDEX: 28.69 KG/M2 | DIASTOLIC BLOOD PRESSURE: 67 MMHG | TEMPERATURE: 96.2 F | HEIGHT: 67 IN | SYSTOLIC BLOOD PRESSURE: 105 MMHG | RESPIRATION RATE: 16 BRPM | WEIGHT: 182.8 LBS

## 2019-11-22 DIAGNOSIS — O21.9 PREGNANCY RELATED NAUSEA AND VOMITING, ANTEPARTUM: ICD-10-CM

## 2019-11-22 DIAGNOSIS — F41.1 GAD (GENERALIZED ANXIETY DISORDER): Primary | ICD-10-CM

## 2019-11-22 DIAGNOSIS — F33.1 MODERATE EPISODE OF RECURRENT MAJOR DEPRESSIVE DISORDER (H): ICD-10-CM

## 2019-11-22 PROCEDURE — 99213 OFFICE O/P EST LOW 20 MIN: CPT | Performed by: NURSE PRACTITIONER

## 2019-11-22 SDOH — HEALTH STABILITY: MENTAL HEALTH: HOW MANY STANDARD DRINKS CONTAINING ALCOHOL DO YOU HAVE ON A TYPICAL DAY?: PATIENT DECLINED

## 2019-11-22 SDOH — HEALTH STABILITY: PHYSICAL HEALTH: ON AVERAGE, HOW MANY MINUTES DO YOU ENGAGE IN EXERCISE AT THIS LEVEL?: 0 MIN

## 2019-11-22 SDOH — SOCIAL STABILITY: SOCIAL NETWORK
DO YOU BELONG TO ANY CLUBS OR ORGANIZATIONS SUCH AS CHURCH GROUPS UNIONS, FRATERNAL OR ATHLETIC GROUPS, OR SCHOOL GROUPS?: NO

## 2019-11-22 SDOH — HEALTH STABILITY: PHYSICAL HEALTH: ON AVERAGE, HOW MANY DAYS PER WEEK DO YOU ENGAGE IN MODERATE TO STRENUOUS EXERCISE (LIKE A BRISK WALK)?: 0 DAYS

## 2019-11-22 SDOH — ECONOMIC STABILITY: TRANSPORTATION INSECURITY
IN THE PAST 12 MONTHS, HAS LACK OF TRANSPORTATION KEPT YOU FROM MEETINGS, WORK, OR FROM GETTING THINGS NEEDED FOR DAILY LIVING?: NO

## 2019-11-22 SDOH — SOCIAL STABILITY: SOCIAL NETWORK: HOW OFTEN DO YOU GET TOGETHER WITH FRIENDS OR RELATIVES?: THREE TIMES A WEEK

## 2019-11-22 SDOH — HEALTH STABILITY: MENTAL HEALTH
STRESS IS WHEN SOMEONE FEELS TENSE, NERVOUS, ANXIOUS, OR CAN'T SLEEP AT NIGHT BECAUSE THEIR MIND IS TROUBLED. HOW STRESSED ARE YOU?: TO SOME EXTENT

## 2019-11-22 SDOH — HEALTH STABILITY: MENTAL HEALTH: HOW OFTEN DO YOU HAVE A DRINK CONTAINING ALCOHOL?: NEVER

## 2019-11-22 SDOH — SOCIAL STABILITY: SOCIAL NETWORK: HOW OFTEN DO YOU ATTEND CHURCH OR RELIGIOUS SERVICES?: NEVER

## 2019-11-22 SDOH — SOCIAL STABILITY: SOCIAL NETWORK: HOW OFTEN DO YOU ATTENT MEETINGS OF THE CLUB OR ORGANIZATION YOU BELONG TO?: NEVER

## 2019-11-22 SDOH — SOCIAL STABILITY: SOCIAL NETWORK
IN A TYPICAL WEEK, HOW MANY TIMES DO YOU TALK ON THE PHONE WITH FAMILY, FRIENDS, OR NEIGHBORS?: MORE THAN THREE TIMES A WEEK

## 2019-11-22 SDOH — ECONOMIC STABILITY: FOOD INSECURITY: WITHIN THE PAST 12 MONTHS, YOU WORRIED THAT YOUR FOOD WOULD RUN OUT BEFORE YOU GOT MONEY TO BUY MORE.: NEVER TRUE

## 2019-11-22 SDOH — ECONOMIC STABILITY: INCOME INSECURITY: HOW HARD IS IT FOR YOU TO PAY FOR THE VERY BASICS LIKE FOOD, HOUSING, MEDICAL CARE, AND HEATING?: NOT VERY HARD

## 2019-11-22 SDOH — ECONOMIC STABILITY: TRANSPORTATION INSECURITY
IN THE PAST 12 MONTHS, HAS THE LACK OF TRANSPORTATION KEPT YOU FROM MEDICAL APPOINTMENTS OR FROM GETTING MEDICATIONS?: NO

## 2019-11-22 SDOH — SOCIAL STABILITY: SOCIAL NETWORK: ARE YOU MARRIED, WIDOWED, DIVORCED, SEPARATED, NEVER MARRIED, OR LIVING WITH A PARTNER?: MARRIED

## 2019-11-22 SDOH — ECONOMIC STABILITY: FOOD INSECURITY: WITHIN THE PAST 12 MONTHS, THE FOOD YOU BOUGHT JUST DIDN'T LAST AND YOU DIDN'T HAVE MONEY TO GET MORE.: NEVER TRUE

## 2019-11-22 SDOH — HEALTH STABILITY: MENTAL HEALTH: HOW OFTEN DO YOU HAVE 6 OR MORE DRINKS ON ONE OCCASION?: NEVER

## 2019-11-22 ASSESSMENT — ENCOUNTER SYMPTOMS
HEARTBURN: 0
MYALGIAS: 0
DIARRHEA: 0
FEVER: 0
HEMATURIA: 0
SHORTNESS OF BREATH: 1
SORE THROAT: 0
ARTHRALGIAS: 0
HEMATOCHEZIA: 0
DYSURIA: 0
NERVOUS/ANXIOUS: 1
CHILLS: 0
HEADACHES: 0
PALPITATIONS: 0
NAUSEA: 1
DIZZINESS: 1
EYE PAIN: 0
COUGH: 0
PARESTHESIAS: 0
ABDOMINAL PAIN: 0
CONSTIPATION: 0
WEAKNESS: 1
JOINT SWELLING: 0
FREQUENCY: 0
BREAST MASS: 0

## 2019-11-22 ASSESSMENT — ANXIETY QUESTIONNAIRES
IF YOU CHECKED OFF ANY PROBLEMS ON THIS QUESTIONNAIRE, HOW DIFFICULT HAVE THESE PROBLEMS MADE IT FOR YOU TO DO YOUR WORK, TAKE CARE OF THINGS AT HOME, OR GET ALONG WITH OTHER PEOPLE: NOT DIFFICULT AT ALL
2. NOT BEING ABLE TO STOP OR CONTROL WORRYING: NOT AT ALL
3. WORRYING TOO MUCH ABOUT DIFFERENT THINGS: NOT AT ALL
7. FEELING AFRAID AS IF SOMETHING AWFUL MIGHT HAPPEN: NOT AT ALL
1. FEELING NERVOUS, ANXIOUS, OR ON EDGE: SEVERAL DAYS
6. BECOMING EASILY ANNOYED OR IRRITABLE: NOT AT ALL
5. BEING SO RESTLESS THAT IT IS HARD TO SIT STILL: NOT AT ALL
GAD7 TOTAL SCORE: 1

## 2019-11-22 ASSESSMENT — PATIENT HEALTH QUESTIONNAIRE - PHQ9
5. POOR APPETITE OR OVEREATING: NOT AT ALL
SUM OF ALL RESPONSES TO PHQ QUESTIONS 1-9: 2

## 2019-11-22 ASSESSMENT — MIFFLIN-ST. JEOR: SCORE: 1566.81

## 2019-11-23 ASSESSMENT — ANXIETY QUESTIONNAIRES: GAD7 TOTAL SCORE: 1

## 2019-12-03 ENCOUNTER — PRENATAL OFFICE VISIT (OUTPATIENT)
Dept: OBGYN | Facility: CLINIC | Age: 34
End: 2019-12-03
Payer: COMMERCIAL

## 2019-12-03 VITALS — BODY MASS INDEX: 28.82 KG/M2 | WEIGHT: 184 LBS | DIASTOLIC BLOOD PRESSURE: 70 MMHG | SYSTOLIC BLOOD PRESSURE: 110 MMHG

## 2019-12-03 DIAGNOSIS — O30.049 DICHORIONIC DIAMNIOTIC TWIN PREGNANCY, ANTEPARTUM: Primary | ICD-10-CM

## 2019-12-03 DIAGNOSIS — Z98.891 HISTORY OF CESAREAN DELIVERY: ICD-10-CM

## 2019-12-03 PROCEDURE — 99207 ZZC PRENATAL VISIT: CPT | Performed by: OBSTETRICS & GYNECOLOGY

## 2019-12-03 RX ORDER — POLYETHYLENE GLYCOL 3350 17 G/17G
1 POWDER, FOR SOLUTION ORAL DAILY
COMMUNITY
End: 2020-04-01

## 2019-12-03 NOTE — NURSING NOTE
"Chief Complaint   Patient presents with     Prenatal Care     14 6/7 weeks       Initial /70   Wt 83.5 kg (184 lb)   LMP  (LMP Unknown)   BMI 28.82 kg/m   Estimated body mass index is 28.82 kg/m  as calculated from the following:    Height as of 19: 1.702 m (5' 7\").    Weight as of this encounter: 83.5 kg (184 lb).  BP completed using cuff size: regular    Questioned patient about current smoking habits.  Pt. has never smoked.          The following HM Due: NONE  +nausea but better    Gema Galeas, JAMMIE    "

## 2019-12-09 ENCOUNTER — HEALTH MAINTENANCE LETTER (OUTPATIENT)
Age: 34
End: 2019-12-09

## 2019-12-20 ENCOUNTER — PRENATAL OFFICE VISIT (OUTPATIENT)
Dept: OBGYN | Facility: CLINIC | Age: 34
End: 2019-12-20
Payer: COMMERCIAL

## 2019-12-20 ENCOUNTER — TELEPHONE (OUTPATIENT)
Dept: OBGYN | Facility: CLINIC | Age: 34
End: 2019-12-20

## 2019-12-20 VITALS — BODY MASS INDEX: 28.98 KG/M2 | WEIGHT: 185 LBS | DIASTOLIC BLOOD PRESSURE: 60 MMHG | SYSTOLIC BLOOD PRESSURE: 122 MMHG

## 2019-12-20 DIAGNOSIS — O30.049 DICHORIONIC DIAMNIOTIC TWIN PREGNANCY, ANTEPARTUM: Primary | ICD-10-CM

## 2019-12-20 DIAGNOSIS — N89.8 VAGINAL ITCHING: ICD-10-CM

## 2019-12-20 LAB
SPECIMEN SOURCE: NORMAL
WET PREP SPEC: NORMAL

## 2019-12-20 PROCEDURE — 99207 ZZC PRENATAL VISIT: CPT | Performed by: OBSTETRICS & GYNECOLOGY

## 2019-12-20 PROCEDURE — 87210 SMEAR WET MOUNT SALINE/INK: CPT | Performed by: OBSTETRICS & GYNECOLOGY

## 2019-12-20 RX ORDER — ASPIRIN 81 MG/1
81 TABLET, CHEWABLE ORAL DAILY
COMMUNITY
End: 2020-06-19

## 2019-12-20 NOTE — PROGRESS NOTES
Just had a bad feeling all day yesterday, feels worried and doesn't know why.  Also having vaginal itching.  No FM yet, only 17 weeks.    BSUS with active FM x2, grossly normal appearing fluid, FHT 150s and 140s    34 year old  at 17w2d   - discussed palliative measures for LBP  - gave reassurance for fetal status as they look good on ultrasound today - ok to use vistaril sparingly prn for pain or anxiety  - anatomy US scheduled    RTC 4 weeks    Marlene Lo MD, MPH  [facility]

## 2019-12-20 NOTE — NURSING NOTE
"Chief Complaint   Patient presents with     Prenatal Care     17 2/7 weeks di di twins       Initial /60   Wt 83.9 kg (185 lb)   LMP  (LMP Unknown)   BMI 28.98 kg/m   Estimated body mass index is 28.98 kg/m  as calculated from the following:    Height as of 19: 1.702 m (5' 7\").    Weight as of this encounter: 83.9 kg (185 lb).  BP completed using cuff size: regular    Questioned patient about current smoking habits.  Pt. has never smoked.          The following HM Due: NONE    -movement yet    Gema Galeas, JAMMIE      "

## 2019-12-20 NOTE — TELEPHONE ENCOUNTER
Pt calls stating that she just feels worried that something is wrong.  She is sure that she is being anxious.    Some back pain, pelvic pains.  Nothing sounded really worrisome but I offered appt to ease her mind.     I did advise she may want to try maternity belt at some point to see if that helps.    Usha MICHEL R.N.  [facility]

## 2019-12-26 ENCOUNTER — MYC MEDICAL ADVICE (OUTPATIENT)
Dept: PEDIATRICS | Facility: CLINIC | Age: 34
End: 2019-12-26

## 2019-12-26 DIAGNOSIS — F33.1 MODERATE EPISODE OF RECURRENT MAJOR DEPRESSIVE DISORDER (H): ICD-10-CM

## 2019-12-26 NOTE — TELEPHONE ENCOUNTER
I'm ok with her increasing dose to 75 (which is 1.5 tabs instead of 1). I have sent the script in.    I would also encourage her to see her therapist at least once per week.    Please have her schedule phone f/u with AMS in 4-6 weeks.    Please have her let her OB know she is going up on the dose.

## 2019-12-26 NOTE — TELEPHONE ENCOUNTER
"Pt seeking to increase dose of sertraline (Zoloft), currently taking 50 mg daily.    Pt is currently pregnant with di-di twins. Per last OV (on 11/22/19):  Stable, she does have a history of PPD and anxiety and is seeing therapist. Encouraged to contiue seeing therpaist. She has considerable stress regarding twin pregnancy and how breatsfeeding will go and how she will alter work schedule to afford expenses. Encouraged she schedule with me right after having babies to discuss lactation. Could consider increasing zoloft dose after delivery.    Pt also prescribed hydroxyzine (vistaril) 25 mg as PRN (advised to used sparingly).    Routing to [facility] providers for review. While PCP is out of the office, should we have pt review with OB/Gyn?    - Guido \"Raul\" STEVENSON Welch  [facility]    "

## 2020-01-04 ENCOUNTER — HOSPITAL ENCOUNTER (EMERGENCY)
Facility: CLINIC | Age: 35
Discharge: HOME | End: 2020-01-04
Attending: INTERNAL MEDICINE | Admitting: INTERNAL MEDICINE
Payer: COMMERCIAL

## 2020-01-04 ENCOUNTER — NURSE TRIAGE (OUTPATIENT)
Dept: NURSING | Facility: CLINIC | Age: 35
End: 2020-01-04

## 2020-01-04 VITALS
OXYGEN SATURATION: 98 % | DIASTOLIC BLOOD PRESSURE: 72 MMHG | HEART RATE: 89 BPM | RESPIRATION RATE: 18 BRPM | BODY MASS INDEX: 28.88 KG/M2 | SYSTOLIC BLOOD PRESSURE: 118 MMHG | TEMPERATURE: 98.4 F | HEIGHT: 67 IN | WEIGHT: 184 LBS

## 2020-01-04 DIAGNOSIS — F41.0 PANIC ATTACK: ICD-10-CM

## 2020-01-04 PROCEDURE — 99283 EMERGENCY DEPT VISIT LOW MDM: CPT

## 2020-01-04 RX ORDER — LORAZEPAM 0.5 MG/1
0.5 TABLET ORAL EVERY 6 HOURS PRN
Qty: 12 TABLET | Refills: 0 | Status: SHIPPED | OUTPATIENT
Start: 2020-01-04 | End: 2020-04-14 | Stop reason: DRUGHIGH

## 2020-01-04 ASSESSMENT — ENCOUNTER SYMPTOMS: NERVOUS/ANXIOUS: 1

## 2020-01-04 ASSESSMENT — MIFFLIN-ST. JEOR: SCORE: 1572.25

## 2020-01-04 NOTE — LETTER
January 4, 2020      To Whom It May Concern:      Gay Cummins was seen in our Emergency Department today, 01/04/20. She may not work for the next 2 nights. She may return to work/school when improved.    Sincerely,        Lois Sloan MD

## 2020-01-04 NOTE — ED AVS SNAPSHOT
[facility] Emergency Department  [address]  [facility] [address]  Phone:  [phone]  Fax:  [phone]                                    Gay Cummins   MRN: [id number]    Department:  [facility] Emergency Department   Date of Visit:  1/4/2020           After Visit Summary Signature Page    I have received my discharge instructions, and my questions have been answered. I have discussed any challenges I see with this plan with the nurse or doctor.    ..........................................................................................................................................  Patient/Patient Representative Signature      ..........................................................................................................................................  Patient Representative Print Name and Relationship to Patient    ..................................................               ................................................  Date                                   Time    ..........................................................................................................................................  Reviewed by Signature/Title    ...................................................              ..............................................  Date                                               Time          22EPIC Rev 08/18

## 2020-01-05 NOTE — TELEPHONE ENCOUNTER
"\"I am having a panic attack and I've taken my meds, they aren't helping. I am pregnant with twins(19 weeks) and I just don't know what to do, \" Triaged and stayed on the phone with patient until her  got home. Caller states she thinks it was brought on my an argument she and her  got into. She also states they are worried about having twins and are not sure how they are going to handle it and she also works nights and feels sleep deprived. Patient was crying and breathing very fast on the phone, but during triage was able to calm down a little and slow breathing down some. I spoke with  once he came home and advised ER. Call back if needed.  oJsefina Tran RN [facility]      Reason for Disposition    [1] Difficulty breathing AND [2] persists > 10 minutes AND [3] not relieved by reassurance provided by triager    Protocols used: ANXIETY AND PANIC ATTACK-A-AH      "

## 2020-01-05 NOTE — ED PROVIDER NOTES
History     Chief Complaint:  Panic Attack    HPI   Gay Cummins is a  34 year old female, with a history of anxiety, who presents with her  to the ED for evaluation of panic attack. The patient reports she has anxiety which was controlled with Celexa. She was taken off when she became pregnant and was started on Zoloft. Her Zoloft was increased approximately 1.5 weeks ago, which has been controlling her anxiety. She is currently 19 weeks pregnant with twins. It was noted that Zoloft is safe in pregnancy. She does see a therapist. She has an appointment with her therapist in 2 days. She also has increased her sessions to weekly appointments. 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 Her and her  were discussing about planning for the twins and got into an argument tonight. She subsequently could not stop crying and was hyperventilating with palpitations for approximately 20-30 minutes. She did take one 25mg Hydroxyzine, instructed by her OB/GYN to take as needed. The patient denies any current physical symptoms/complaints.      Allergies:  No known drug allergies    Medications:    Aspirin 81mg  Hydroxyzine  Miralax  Prenatal Multivitamin  Zoloft   Zofran-ODT     Past Medical History:    Anxiety   Depression     Past Surgical History:    Adenoidectomy     Cholecystectomy    Family History:    RA: mother    Social History:  Smoking status: Never smoker    Substance use: No  Alcohol use: No  Presents to ED with     Marital Status:   [2]     Review of Systems   Psychiatric/Behavioral: The patient is nervous/anxious.    All other systems reviewed and are negative.      Physical Exam     Patient Vitals for the past 24 hrs:   BP Temp Temp src Pulse Resp SpO2 Height Weight   20 2110 118/72 -- -- 89 -- 98 % -- --   20 116/64 98.4  F (36.9  C) Oral 91 18 100  "% 1.702 m (5' 7\") 83.5 kg (184 lb)     Physical Exam  Constitutional:       Comments: Pleasant and cooperative   HENT:      Mouth/Throat:      Pharynx: No posterior oropharyngeal erythema.   Eyes:      Conjunctiva/sclera: Conjunctivae normal.   Neck:      Musculoskeletal: Neck supple.   Cardiovascular:      Rate and Rhythm: Normal rate and regular rhythm.      Heart sounds: Normal heart sounds.   Pulmonary:      Effort: Pulmonary effort is normal.      Breath sounds: Normal breath sounds.   Abdominal:      Palpations: Abdomen is soft.      Tenderness: There is no abdominal tenderness.      Comments: gravid   Musculoskeletal: Normal range of motion.   Skin:     General: Skin is warm and dry.   Neurological:      Mental Status: She is alert.   Psychiatric:         Mood and Affect: Mood is anxious.         Emergency Department Course     Emergency Department Course:  Past medical records, nursing notes, and vitals reviewed.    2108: I performed an exam of the patient as documented above.     2139: I performed a bedside fetal ultrasound. Fetal cardiac activity noted x2. Explained plan to patient.     Findings and plan explained to the patient and spouse. Patient discharged home with instructions regarding supportive care, medications, and reasons to return. The importance of close follow-up was reviewed. The patient was prescribed Ativan.     I personally answered all related questions prior to discharge.     Impression & Plan      Medical Decision Making:    Gay Cummins is a 34 year old female currently about 19 weeks pregnant who presents to the emergency department after having a panic attack.  She is now on Zoloft and has been following regularly with a therapist.  She has multiple areas of stress including working the night shift and has slept poorly.  I reviewed up-to-date in detail regarding potential medications for use under the circumstances.  I discussed options with her.  I discussed starting trazodone " to assist with anxiety and sleep but she was not comfortable with this.  She is already taking hydroxyzine as needed.  I reviewed with her the evidence regarding benzodiazepines. I discussed with her the findings of the [place] [employer] that felt that the studies that were most reliable showed no increase in birth defects with benzodiazepines.  While the safest option is not to use medication I felt that this might give her a feeling of control for immediate relief pending follow-up with primary care and her therapist.  She will only take this as needed.  I have given her a note to be off work for the next 2 nights so that she can sleep.  I will have her call her therapist Monday morning, return if worse or other problems.    Diagnosis:    ICD-10-CM   1. Panic attack F41.0     Disposition: Patient discharged to home with       Discharge Medications:  New Prescriptions    LORAZEPAM (ATIVAN) 0.5 MG TABLET    Take 1 tablet (0.5 mg) by mouth every 6 hours as needed for anxiety     Miriam Ortega  1/4/2020   [facility] EMERGENCY DEPARTMENT    Scribe Disclosure:  I, Miriam Ortega, am serving as a scribe at 9:08 PM on 1/4/2020 to document services personally performed by Lois Sloan MD based on my observations and the provider's statements to me.       Lois Sloan MD  01/04/20 5823

## 2020-01-05 NOTE — ED TRIAGE NOTES
Patient comes in for evaluation of a panic attack. Patient states she has a history of anxiety, is on zoloft and it has been well controlled for years. Patient is 19 weeks pregnant with twins. Patient notes that tonight she was worrying about the babies, preparing, and arguing with spouse and that triggered her panic attack. Patient also notes that she works night shifts so her sleep is off due to that. ABCs intact.

## 2020-01-06 ENCOUNTER — MYC MEDICAL ADVICE (OUTPATIENT)
Dept: OBGYN | Facility: CLINIC | Age: 35
End: 2020-01-06

## 2020-01-08 ENCOUNTER — OFFICE VISIT (OUTPATIENT)
Dept: MATERNAL FETAL MEDICINE | Facility: CLINIC | Age: 35
End: 2020-01-08
Attending: OBSTETRICS & GYNECOLOGY
Payer: COMMERCIAL

## 2020-01-08 ENCOUNTER — HOSPITAL ENCOUNTER (OUTPATIENT)
Dept: ULTRASOUND IMAGING | Facility: CLINIC | Age: 35
Discharge: HOME | End: 2020-01-08
Attending: OBSTETRICS & GYNECOLOGY | Admitting: OBSTETRICS & GYNECOLOGY
Payer: COMMERCIAL

## 2020-01-08 DIAGNOSIS — O26.90 PREGNANCY RELATED CONDITION, ANTEPARTUM: ICD-10-CM

## 2020-01-08 DIAGNOSIS — O30.049 DICHORIONIC DIAMNIOTIC TWIN PREGNANCY, ANTEPARTUM: Primary | ICD-10-CM

## 2020-01-08 PROCEDURE — 76812 OB US DETAILED ADDL FETUS: CPT

## 2020-01-08 NOTE — PROGRESS NOTES
"Please see \"Imaging\" tab under \"Chart Review\" for details of today's US.    Cherry Cramer, DO    "

## 2020-01-10 ENCOUNTER — NURSE TRIAGE (OUTPATIENT)
Dept: NURSING | Facility: CLINIC | Age: 35
End: 2020-01-10

## 2020-01-10 ENCOUNTER — HOSPITAL ENCOUNTER (OUTPATIENT)
Facility: CLINIC | Age: 35
End: 2020-01-10
Admitting: OBSTETRICS & GYNECOLOGY
Payer: COMMERCIAL

## 2020-01-10 ENCOUNTER — HOSPITAL ENCOUNTER (OUTPATIENT)
Facility: CLINIC | Age: 35
Discharge: HOME | End: 2020-01-10
Attending: OBSTETRICS & GYNECOLOGY | Admitting: OBSTETRICS & GYNECOLOGY
Payer: COMMERCIAL

## 2020-01-10 VITALS — DIASTOLIC BLOOD PRESSURE: 64 MMHG | TEMPERATURE: 98.4 F | SYSTOLIC BLOOD PRESSURE: 115 MMHG | RESPIRATION RATE: 18 BRPM

## 2020-01-10 LAB
ALBUMIN UR-MCNC: NEGATIVE MG/DL
AMORPH CRY #/AREA URNS HPF: ABNORMAL /HPF
APPEARANCE UR: CLEAR
BACTERIA #/AREA URNS HPF: ABNORMAL /HPF
BILIRUB UR QL STRIP: NEGATIVE
COLOR UR AUTO: ABNORMAL
GLUCOSE UR STRIP-MCNC: NEGATIVE MG/DL
HGB UR QL STRIP: NEGATIVE
KETONES UR STRIP-MCNC: NEGATIVE MG/DL
LEUKOCYTE ESTERASE UR QL STRIP: NEGATIVE
MUCOUS THREADS #/AREA URNS LPF: PRESENT /LPF
NITRATE UR QL: NEGATIVE
PH UR STRIP: 7 PH (ref 5–7)
RBC #/AREA URNS AUTO: 1 /HPF (ref 0–2)
SOURCE: ABNORMAL
SP GR UR STRIP: 1.02 (ref 1–1.03)
SQUAMOUS #/AREA URNS AUTO: <1 /HPF (ref 0–1)
UROBILINOGEN UR STRIP-MCNC: NORMAL MG/DL (ref 0–2)
WBC #/AREA URNS AUTO: 1 /HPF (ref 0–5)

## 2020-01-10 PROCEDURE — 81001 URINALYSIS AUTO W/SCOPE: CPT | Performed by: OBSTETRICS & GYNECOLOGY

## 2020-01-10 PROCEDURE — G0463 HOSPITAL OUTPT CLINIC VISIT: HCPCS

## 2020-01-10 RX ORDER — ONDANSETRON 2 MG/ML
4 INJECTION INTRAMUSCULAR; INTRAVENOUS EVERY 6 HOURS PRN
Status: DISCONTINUED | OUTPATIENT
Start: 2020-01-10 | End: 2020-01-10 | Stop reason: HOSPADM

## 2020-01-10 NOTE — PROVIDER NOTIFICATION
01/10/20 1324   Provider Notification   Provider Name/Title Dr Lo   Method of Notification Phone   Request Evaluate - Remote   Updated re: pt arrival and symptoms, no contractions on toco and abdomen palpates soft, Doptones baby A 130 and baby b 120. UA results reviewed. Orders to discharge patient to home.

## 2020-01-10 NOTE — TELEPHONE ENCOUNTER
"7:49 a.m. paged Dr Marlene Lo to call patient directly.  Advised patient, if she hasn't heard from MD within 30 minutes, to call back and we'll re-page.  Jeannie Gomez RN-[facility]      Reason for Disposition    MODERATE-SEVERE abdominal pain (e.g., interferes with normal activities, awakens from sleep)    Additional Information    Negative: Passed out (i.e., lost consciousness, collapsed and was not responding)    Negative: Shock suspected (e.g., cold/pale/clammy skin, too weak to stand, low BP, rapid pulse)    Negative: Difficult to awaken or acting confused (e.g., disoriented, slurred speech)    Negative: [1] SEVERE abdominal pain (e.g., excruciating) AND [2] constant AND [3] present > 1 hour     Pain at 6    Negative: SEVERE vaginal bleeding (e.g., continuous red blood from vagina, or large blood clots)    Negative: Sounds like a life-threatening emergency to the triager    Negative: Followed an abdomen (stomach) injury    Negative: [1] Having contractions or other symptoms of labor (such as vaginal pressure) AND [2] >= 37 weeks pregnant (i.e., term pregnancy)    Negative: [1] Having contractions or other symptoms of labor (such as vaginal pressure) AND [2] < 37 weeks pregnant (i.e., )    Negative: [1] Abdominal pain AND [2] pregnant < 20 weeks    Negative: [1] Vomiting AND [2] contains red blood or black (\"coffee ground\") material  (Exception: few red streaks in vomit that only happened once)    Protocols used: PREGNANCY - ABDOMINAL PAIN GREATER THAN 20 WEEKS EGA-A-AH      "

## 2020-01-10 NOTE — DISCHARGE INSTRUCTIONS
Discharge Instruction for Undelivered Patients      You were seen for: abdominal cramping  We Consulted: Dr Lo  You had (Test or Medicine):uterine monitoring, UA, fetal doptones     Diet:   Drink 8 to 12 glasses of liquids (milk, juice, water) every day.  You may eat meals and snacks.     Activity:  Normal activity as tolerated.     Call your provider if you notice:  Swelling in your face or increased swelling in your hands or legs.  Headaches that are not relieved by Tylenol (acetaminophen).  Changes in your vision (blurring: seeing spots or stars.)  Nausea (sick to your stomach) and vomiting (throwing up).   Weight gain of 5 pounds or more per week.  Heartburn that doesn't go away.  Signs of bladder infection: pain when you urinate (use the toilet), need to go more often and more urgently.  The bag of carlin (rupture of membranes) breaks, or you notice leaking in your underwear.  Bright red blood in your underwear.  Abdominal (lower belly) or stomach pain.  Second (plus) baby: Contractions (tightening) less than 10 minutes apart and getting stronger.  *If less than 34 weeks: Contractions (tightenings) more than 6 times in one hour.  Increase or change in vaginal discharge (note the color and amount)  Other: Call clinic with any questions or concerns.    Follow-up:  As scheduled in the clinic

## 2020-01-10 NOTE — PLAN OF CARE
Data: Patient assessed in the [facility] for abdominal pain.  Cervical exam not examined.  Membranes intact.  Contractions none.  Action:  Discharge instructions reviewed.  Patient instructed to report change in cramping, vaginal leaking of fluid or bleeding, abdominal pain, or any concerns related to the pregnancy to her nurse/physician.    Response: Orders to discharge home per Dr Lo.  Patient verbalized understanding of education and verbalized agreement with plan. Discharged to home at 1340 with spouse..

## 2020-01-10 NOTE — PLAN OF CARE
Data: Patient presented to [facility]: 1/10/2020 12:23 PM.  Reason for maternal/fetal assessment is abdominal cramping and pelvic pressure. Patient reports this began last night and has conitnued. 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 States this pain started shortly after beginning work.  Patient is a .  Prenatal record reviewed. Pregnancy  has been complicated by twin pregnancy, anxiety, was seen in ED on 6 days ago for a panic attack.  Gestational Age 20w2d. VSS. Patient denies leaking of vaginal fluid/rupture of membranes, vaginal bleeding. Support person is present.   Action: Verbal consent for EFM. Triage assessment completed. Bill of rights reviewed.  Response: Patient verbalized agreement with plan. Will contact Dr Marlene Lo with update and further orders.

## 2020-01-21 ENCOUNTER — PRENATAL OFFICE VISIT (OUTPATIENT)
Dept: OBGYN | Facility: CLINIC | Age: 35
End: 2020-01-21
Payer: COMMERCIAL

## 2020-01-21 VITALS
BODY MASS INDEX: 29.82 KG/M2 | WEIGHT: 190 LBS | SYSTOLIC BLOOD PRESSURE: 110 MMHG | DIASTOLIC BLOOD PRESSURE: 60 MMHG | HEIGHT: 67 IN

## 2020-01-21 DIAGNOSIS — O26.899 PELVIC PAIN IN PREGNANCY: ICD-10-CM

## 2020-01-21 DIAGNOSIS — O30.049 DICHORIONIC DIAMNIOTIC TWIN PREGNANCY, ANTEPARTUM: Primary | ICD-10-CM

## 2020-01-21 DIAGNOSIS — R10.2 PELVIC PAIN IN PREGNANCY: ICD-10-CM

## 2020-01-21 PROCEDURE — 99207 ZZC PRENATAL VISIT: CPT | Performed by: OBSTETRICS & GYNECOLOGY

## 2020-01-21 ASSESSMENT — MIFFLIN-ST. JEOR: SCORE: 1599.46

## 2020-01-21 NOTE — PROGRESS NOTES
"35yo  with Di/Di twins at 21w6d here for pelvic pain.  Patient tearful and anxious appearing.    Describes rather diffuse abdominal pelvic pain which is bilateral along sides of fundus and into groin.  \"Throbbing and aching which never goes away\"  \"Like a crowbar to my vagina\"  Pain is at its worse after a shift in the ICU.  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    Has been using a pelvic support belt.  Will schedule referral to PT as I suspect this is a combination of musculoskeletal pain and anxiety.  Note to be off work x 2 weeks provided.    RTC as scheduled.  "

## 2020-01-21 NOTE — NURSING NOTE
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    Weight as of this encounter: 86.2 kg (190 lb).  BP completed using cuff size: regular    Questioned patient about current smoking habits.  Pt. has never smoked.          The following HM Due: NONE       "

## 2020-01-21 NOTE — LETTER
[facility] [address], [address]  [place]  [address]  Telephone [phone]  Fax [phone]              Date: 1/21/2020      Name: Gay Cummins                       YOB: 1985        Gay Cummins is pregnant    Estimated Date of Delivery: May 27, 2020     Due to abdominal and pelvic pain in a high risk twin pregnancy, Gay will need to be off work x 2 weeks starting tonight, 1/21/20.            _________________________  Solo Coleman MD

## 2020-01-24 ENCOUNTER — TELEPHONE (OUTPATIENT)
Dept: OBGYN | Facility: CLINIC | Age: 35
End: 2020-01-24

## 2020-01-24 DIAGNOSIS — O26.899 PELVIC PAIN IN PREGNANCY: Primary | ICD-10-CM

## 2020-01-24 DIAGNOSIS — R10.2 PELVIC PAIN IN PREGNANCY: Primary | ICD-10-CM

## 2020-01-24 NOTE — TELEPHONE ENCOUNTER
Form received from: Incoming fax from [employer]ena Guerin ([facility])     Form requesting following info/need: [employer], FMLA     TRICIA needed?: NA     Location of form: basket above Phoebe's desk     When completed the route for return: return fax to number on the form

## 2020-01-24 NOTE — TELEPHONE ENCOUNTER
Left message on voice mail to discuss form with patient.  If patient calls back please transfer to me Maxine Roche on Friday and [place] Monday   Brittnee Martinez CMA

## 2020-01-27 ENCOUNTER — TELEPHONE (OUTPATIENT)
Dept: OBGYN | Facility: CLINIC | Age: 35
End: 2020-01-27

## 2020-01-27 NOTE — TELEPHONE ENCOUNTER
Form received from: [employer]     Form requesting following info/need: STD    TRICIA needed?: No    Location of form: Phoebe's desk    When completed the route for return: Fax [phone]

## 2020-01-27 NOTE — TELEPHONE ENCOUNTER
I have sent a My Chart message for patient to call back and review forms.  They are in Dr. Coleman's [place] fax bin.  Brittnee Martinez CMA

## 2020-01-27 NOTE — TELEPHONE ENCOUNTER
Patient called back.  Forms discussed - will complete with Dr. Coleman tomorrow.  Brittnee Martinez CMA

## 2020-01-27 NOTE — TELEPHONE ENCOUNTER
I have called and sent a My Chart message for patient to call back and review forms.  They are in Dr. Coleman's [place] fax bin.  Brittnee Martinez CMA

## 2020-01-27 NOTE — TELEPHONE ENCOUNTER
Patient called back.  Forms discuss - will complete with Dr. Coleman tomorrow.  Brittnee Martinez CMA

## 2020-01-28 NOTE — TELEPHONE ENCOUNTER
3 sets of forms completed and faxed to [facility] [phone]  Copies in Dr. Virginia Marrero fax bin and originals sent to scan    Brittnee Martinez CMA

## 2020-01-29 ENCOUNTER — OFFICE VISIT (OUTPATIENT)
Dept: MATERNAL FETAL MEDICINE | Facility: CLINIC | Age: 35
End: 2020-01-29
Attending: OBSTETRICS & GYNECOLOGY
Payer: COMMERCIAL

## 2020-01-29 ENCOUNTER — HOSPITAL ENCOUNTER (OUTPATIENT)
Dept: ULTRASOUND IMAGING | Facility: CLINIC | Age: 35
Discharge: HOME | End: 2020-01-29
Attending: OBSTETRICS & GYNECOLOGY | Admitting: OBSTETRICS & GYNECOLOGY
Payer: COMMERCIAL

## 2020-01-29 DIAGNOSIS — O30.049 DICHORIONIC DIAMNIOTIC TWIN PREGNANCY, ANTEPARTUM: Primary | ICD-10-CM

## 2020-01-29 DIAGNOSIS — O30.049 DICHORIONIC DIAMNIOTIC TWIN PREGNANCY, ANTEPARTUM: ICD-10-CM

## 2020-01-29 PROCEDURE — 76816 OB US FOLLOW-UP PER FETUS: CPT | Mod: 59

## 2020-01-29 NOTE — PROGRESS NOTES
Please see full imaging report from [employer] program under imaging tab.    Findings reviewed with Gay and her  today. She has been dealing with increased cramping which has kept her out of work, as well as concern that her Zoloft is not working as effectively as the Celexa did prior to pregnancy. She switched to Zoloft due to longer safety history in early pregnancy, but inquires about the possibility of switching back to Celexa which has been very effective for her in the past. Her medication is managed by her primary care provider in the [facility] system. We reviewed the risks of untreated depression and/or anxiety in pregnancy, and I support a switch of medication if this will be more helpful for controlling her symptoms. She will talk to her primary care physician about changing medication soon. She will return to see us for serial twin growth US (every 4 weeks) and we will attempt to better visualize the profile for twin 1 at her next US as well.     Christiano Brothers MD  Maternal Fetal Medicine

## 2020-01-31 ENCOUNTER — PRENATAL OFFICE VISIT (OUTPATIENT)
Dept: OBGYN | Facility: CLINIC | Age: 35
End: 2020-01-31
Payer: COMMERCIAL

## 2020-01-31 ENCOUNTER — VIRTUAL VISIT (OUTPATIENT)
Dept: PEDIATRICS | Facility: CLINIC | Age: 35
End: 2020-01-31
Payer: COMMERCIAL

## 2020-01-31 VITALS — BODY MASS INDEX: 30.02 KG/M2 | SYSTOLIC BLOOD PRESSURE: 102 MMHG | DIASTOLIC BLOOD PRESSURE: 62 MMHG | WEIGHT: 191.7 LBS

## 2020-01-31 DIAGNOSIS — O26.899 PELVIC PAIN IN PREGNANCY: ICD-10-CM

## 2020-01-31 DIAGNOSIS — Z98.891 HISTORY OF CESAREAN DELIVERY: ICD-10-CM

## 2020-01-31 DIAGNOSIS — F41.1 GAD (GENERALIZED ANXIETY DISORDER): Primary | ICD-10-CM

## 2020-01-31 DIAGNOSIS — O30.049 DICHORIONIC DIAMNIOTIC TWIN PREGNANCY, ANTEPARTUM: Primary | ICD-10-CM

## 2020-01-31 DIAGNOSIS — R10.2 PELVIC PAIN IN PREGNANCY: ICD-10-CM

## 2020-01-31 DIAGNOSIS — F33.1 MODERATE EPISODE OF RECURRENT MAJOR DEPRESSIVE DISORDER (H): ICD-10-CM

## 2020-01-31 PROCEDURE — 98966 PH1 ASSMT&MGMT NQHP 5-10: CPT | Performed by: NURSE PRACTITIONER

## 2020-01-31 PROCEDURE — 99207 ZZC PRENATAL VISIT: CPT | Performed by: OBSTETRICS & GYNECOLOGY

## 2020-01-31 RX ORDER — CITALOPRAM HYDROBROMIDE 20 MG/1
TABLET ORAL
Qty: 60 TABLET | Refills: 0 | Status: SHIPPED | OUTPATIENT
Start: 2020-01-31 | End: 2020-04-14 | Stop reason: DRUGHIGH

## 2020-01-31 RX ORDER — SERTRALINE HYDROCHLORIDE 25 MG/1
TABLET, FILM COATED ORAL
Start: 2020-01-31 | End: 2020-02-27 | Stop reason: ALTCHOICE

## 2020-01-31 ASSESSMENT — ANXIETY QUESTIONNAIRES
1. FEELING NERVOUS, ANXIOUS, OR ON EDGE: MORE THAN HALF THE DAYS
IF YOU CHECKED OFF ANY PROBLEMS ON THIS QUESTIONNAIRE, HOW DIFFICULT HAVE THESE PROBLEMS MADE IT FOR YOU TO DO YOUR WORK, TAKE CARE OF THINGS AT HOME, OR GET ALONG WITH OTHER PEOPLE: SOMEWHAT DIFFICULT
GAD7 TOTAL SCORE: 11
3. WORRYING TOO MUCH ABOUT DIFFERENT THINGS: NEARLY EVERY DAY
5. BEING SO RESTLESS THAT IT IS HARD TO SIT STILL: NOT AT ALL
6. BECOMING EASILY ANNOYED OR IRRITABLE: NOT AT ALL
7. FEELING AFRAID AS IF SOMETHING AWFUL MIGHT HAPPEN: SEVERAL DAYS
2. NOT BEING ABLE TO STOP OR CONTROL WORRYING: MORE THAN HALF THE DAYS

## 2020-01-31 ASSESSMENT — PATIENT HEALTH QUESTIONNAIRE - PHQ9
5. POOR APPETITE OR OVEREATING: NEARLY EVERY DAY
SUM OF ALL RESPONSES TO PHQ QUESTIONS 1-9: 13

## 2020-01-31 NOTE — PROGRESS NOTES
35yo  with Di/Di twins at 23w2d here for routine visit.  MFM U/S 2 days ago normal.  Will have serial growth scans Q4 wks.    Pelvic pain and anxiety improved but still has bad days.  Has a trip to [place] upcoming and will attempt to RTW following if feels better.  May need some work restrictions.  High risk for PTL given multifetal gestation.  RTC 4 weeks or prn.

## 2020-01-31 NOTE — PROGRESS NOTES
Subjective:   Gay Cummins is a 34 year old female who scheduled a phone visit to discuss mood.    CC:   Depression and Anxiety Follow-Up    How are you doing with your depression since your last visit? Worsened, seeing therapist who confirms this, patient willing to sign TRICIA if provider needs to talk to therapist, evan-natalogists has agreed that patient and babies would be safe to switch back to Celexa which worked in the past for patient, see note in EMR.    How are you doing with your anxiety since your last visit?  Worsened     Are you having other symptoms that might be associated with depression or anxiety? Yes:  pregnant with twins, having additional pain due to pregnancy.    Have you had a significant life event? OTHER: Off work for a few weeks per OB, having to navigate STD.     Do you have any concerns with your use of alcohol or other drugs? No    Social History     Tobacco Use     Smoking status: Never Smoker     Smokeless tobacco: Never Used   Substance Use Topics     Alcohol use: Not Currently     Alcohol/week: 0.0 standard drinks     Frequency: Never     Drinks per session: Patient refused     Binge frequency: Never     Drug use: No     PHQ 4/18/2019 11/22/2019 1/31/2020   PHQ-9 Total Score 3 2 13   Q9: Thoughts of better off dead/self-harm past 2 weeks Not at all Not at all Not at all     SANDEEP-7 SCORE 4/18/2019 11/22/2019 1/31/2020   Total Score 2 1 11       In the past two weeks have you had thoughts of suicide or self-harm?  No.    Do you have concerns about your personal safety or the safety of others?   No    Suicide Assessment Five-step Evaluation and Treatment (SAFE-T)      How many servings of fruits and vegetables do you eat daily?  2-3    On average, how many sweetened beverages do you drink each day (Examples: soda, juice, sweet tea, etc.  Do NOT count diet or artificially sweetened beverages)?   0    How many days per week do you exercise enough to make your heart beat faster? 3 or  less    How many minutes a day do you exercise enough to make your heart beat faster? 10 - 19    How many days per week do you miss taking your medication? 0    Is 23 wks pregnant with twins and has had complicated pregnancy and is now out of work. She had previusly bee on celexa with good relief, and we did switch to zolfot and increased dose 10 days ago with WORSENING symptoms of depression. Denies thoughts of self harm or harming others and denies suicidal ideation.sees therapist weekly.     Medical, surgical and social histories reviewed.  Current Outpatient Medications   Medication Sig Dispense Refill     citalopram (CELEXA) 20 MG tablet Take 10 mg PO daily x 1 wk, then take 20 mg daily 60 tablet 0     sertraline (ZOLOFT) 25 MG tablet Take 50 mg x3 days, then take 25 mg PO x 3 day       sertraline (ZOLOFT) 50 MG tablet Take 1.5 tablets (75 mg) by mouth daily 90 tablet 1     aspirin (ASA) 81 MG chewable tablet Take 81 mg by mouth daily       hydrOXYzine (ATARAX) 25 MG tablet Take 1 tablet (25 mg) by mouth 3 times daily as needed for anxiety 30 tablet 0     LORazepam (ATIVAN) 0.5 MG tablet Take 1 tablet (0.5 mg) by mouth every 6 hours as needed for anxiety 12 tablet 0     ondansetron (ZOFRAN-ODT) 4 MG ODT tab Take 1 tablet (4 mg) by mouth every 8 hours as needed for nausea 20 tablet 1     polyethylene glycol (MIRALAX/GLYCOLAX) packet Take 1 packet by mouth daily       Prenatal Vit-Fe Fumarate-FA (PRENATAL VITAMIN) 27-0.8 MG TABS        sertraline (ZOLOFT) 100 MG tablet Take 1 tablet (100 mg) by mouth daily 60 tablet 0       Objective:   Phone visit, speech, thought content within normal limits. Affective component of speech within normal limits. Rate and tone within normal limits.    Assessment:  1. SANDEEP (generalized anxiety disorder)    2. Moderate episode of recurrent major depressive disorder (H)      PLAN:  We reivewed black box warning, reviewed her symptoms could worsen before improving. She denies suicidal  thoughts, admits the pregnancy has been very difficult for her. She has a therapist, feels supported by family. Will update by jordy in 1 month and if doing well, could consider increasing dose to 40 mg which was her previous dose which was working well for her.     A total of 9 minutes was spent on the phone with the patient.

## 2020-02-01 ASSESSMENT — ANXIETY QUESTIONNAIRES: GAD7 TOTAL SCORE: 11

## 2020-02-03 ENCOUNTER — TELEPHONE (OUTPATIENT)
Dept: OBGYN | Facility: CLINIC | Age: 35
End: 2020-02-03

## 2020-02-03 NOTE — TELEPHONE ENCOUNTER
Leaving for Brittnee to fill out forms. I'm unfamiliar with patients care, and unable to answer questions.    Dov Trinidad, CMA

## 2020-02-03 NOTE — TELEPHONE ENCOUNTER
Form received from: [employer]    Form requesting following info/need: STD    TRICIA needed?: No    Location of form: Phoebe's desk    When completed the route for return: Fax [phone]

## 2020-02-11 ENCOUNTER — MYC MEDICAL ADVICE (OUTPATIENT)
Dept: OBGYN | Facility: CLINIC | Age: 35
End: 2020-02-11

## 2020-02-11 ENCOUNTER — THERAPY VISIT (OUTPATIENT)
Dept: PHYSICAL THERAPY | Facility: CLINIC | Age: 35
End: 2020-02-11
Payer: COMMERCIAL

## 2020-02-11 DIAGNOSIS — M54.50 LOW BACK PAIN DURING PREGNANCY, ANTEPARTUM: ICD-10-CM

## 2020-02-11 DIAGNOSIS — O26.899 LOW BACK PAIN DURING PREGNANCY, ANTEPARTUM: ICD-10-CM

## 2020-02-11 DIAGNOSIS — O30.049 DICHORIONIC DIAMNIOTIC TWIN PREGNANCY, ANTEPARTUM: ICD-10-CM

## 2020-02-11 DIAGNOSIS — R10.2 PELVIC PAIN IN PREGNANCY: ICD-10-CM

## 2020-02-11 DIAGNOSIS — O26.899 PELVIC PAIN IN PREGNANCY: ICD-10-CM

## 2020-02-11 PROCEDURE — 97530 THERAPEUTIC ACTIVITIES: CPT | Mod: GP | Performed by: PHYSICAL THERAPIST

## 2020-02-11 PROCEDURE — 97140 MANUAL THERAPY 1/> REGIONS: CPT | Mod: GP | Performed by: PHYSICAL THERAPIST

## 2020-02-11 PROCEDURE — 97110 THERAPEUTIC EXERCISES: CPT | Mod: GP | Performed by: PHYSICAL THERAPIST

## 2020-02-11 PROCEDURE — 97161 PT EVAL LOW COMPLEX 20 MIN: CPT | Mod: GP | Performed by: PHYSICAL THERAPIST

## 2020-02-11 NOTE — PROGRESS NOTES
Physical Therapy Initial Evaluation    Precautions/Restrictions/MD instructions: PT eval and treat.       Subjective History:    Injury/Condition Details:  Presenting Complaint Gay presents with pelvic pain and lower back pain, ongoing for ~1 month. LBP is with sitting or walking for long periods of time. Pelvic pain is pretty consistent, present all the time. Worse with putting on pants, doing stairs.    Onset Timing/Date Jan 2020; referral date 1/21/20   Mechanism pregnancy      Symptom Behavior Details   Primary Pain Symptoms Location: pubic rami, midline LBP  Quality: back - aching; pelvic pain can be sharp, aching   Frequency: pelvic - constant, LBP - intermittent   Worst Pain 6/10   Best Pain 2/10   Symptom Provocators Stairs, dressing, sitting/walking long periods   Symptom Relievers Icing   Time of day dependent? Worse as day goes on   Symptom change since onset? 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, on 20# lifting restrictions until delivery   Pertinent medical/surgical history Currently pregnant, depression/anxiety   Patient Goals Be able to perform daily activities without pain     LUMBAR:    Posture: slightly increased lumbar lordosis      Dural Signs:   L R   Slump - -   SLR - -       AROM:   Direction ROM Pain   Flexion To mid-shin yes   Extension WNL no   Side Bending L To mid-thigh no   Side Bending R To mid-thigh no   Rotation L WNL no   Rotation R WNL no     Flexibility: restricted gluteals, QL mild  Palpation: tenderness, pain reproduction with palpation at pubic tubercle  Other Tests: hip PROM WNL B); some difficulty engaging TA, pelvic floor initially    Patient is a 34 year old female with lumbar and pelvic complaints.    Patient has the  following significant findings with corresponding treatment plan.                Diagnosis 1:  Pubic pain, LBP in pregnancy  Pain -  manual therapy, self management, education and home program  Decreased ROM/flexibility - manual therapy and therapeutic exercise  Decreased strength - therapeutic exercise and therapeutic activities  Decreased proprioception - neuro re-education and therapeutic activities  Impaired muscle performance - neuro re-education  Decreased function - therapeutic activities    Therapy Evaluation Codes:   1) History comprised of:   Personal factors that impact the plan of care:      None.    Comorbidity factors that impact the plan of care are:      Current pregnancy.     Medications impacting care: None.  2) Examination of Body Systems comprised of:   Body structures and functions that impact the plan of care:      Lumbar spine and Pelvis.   Activity limitations that impact the plan of care are:      Bending, Dressing, Lifting, Sitting, Stairs, Walking and Sleeping.  3) Clinical presentation characteristics are:   Evolving/Changing.  4) Decision-Making    Low complexity using standardized patient assessment instrument and/or measureable assessment of functional outcome.  Cumulative Therapy Evaluation is: Low complexity.    Previous and current functional limitations:  (See Goal Flow Sheet for this information)    Short term and Long term goals: (See Goal Flow Sheet for this information)     Communication ability:  Patient appears to be able to clearly communicate and understand verbal and written communication and follow directions correctly.  Treatment Explanation - The following has been discussed with the patient:   RX ordered/plan of care  Anticipated outcomes  Possible risks and side effects  This patient would benefit from PT intervention to resume normal activities.   Rehab potential is good.    Frequency:  1 X week, once daily x 3 weeks then tapering to 1x every 2 weeks  Duration:  for  8 weeks  Discharge Plan:  Achieve all LTG.  Independent in home treatment program.  Reach maximal therapeutic benefit.    Please refer to the daily flowsheet for treatment today, total treatment time and time spent performing 1:1 timed codes.

## 2020-02-11 NOTE — TELEPHONE ENCOUNTER
Forms in Dr. Coleman's [place] fax bin.   Awaiting patient's employer to tell her whether she can have light duty or if she will need to have the form filled out for short term disability. See My Chart message from 2/11/2020  Brittnee Martinez CMA

## 2020-02-13 ENCOUNTER — HOSPITAL ENCOUNTER (OUTPATIENT)
Facility: CLINIC | Age: 35
Discharge: HOME | End: 2020-02-13
Attending: FAMILY MEDICINE | Admitting: FAMILY MEDICINE
Payer: COMMERCIAL

## 2020-02-13 ENCOUNTER — TELEPHONE (OUTPATIENT)
Dept: OBGYN | Facility: CLINIC | Age: 35
End: 2020-02-13

## 2020-02-13 ENCOUNTER — HOSPITAL ENCOUNTER (OUTPATIENT)
Facility: CLINIC | Age: 35
End: 2020-02-13
Admitting: FAMILY MEDICINE
Payer: COMMERCIAL

## 2020-02-13 VITALS — DIASTOLIC BLOOD PRESSURE: 69 MMHG | RESPIRATION RATE: 18 BRPM | TEMPERATURE: 98.2 F | SYSTOLIC BLOOD PRESSURE: 122 MMHG

## 2020-02-13 DIAGNOSIS — Z36.89 ENCOUNTER FOR TRIAGE IN PREGNANT PATIENT: Primary | ICD-10-CM

## 2020-02-13 DIAGNOSIS — O21.9 VOMITING OR NAUSEA OF PREGNANCY: Primary | ICD-10-CM

## 2020-02-13 LAB
ALBUMIN UR-MCNC: 10 MG/DL
APPEARANCE UR: CLEAR
BACTERIA #/AREA URNS HPF: ABNORMAL /HPF
BILIRUB UR QL STRIP: NEGATIVE
COLOR UR AUTO: YELLOW
GLUCOSE UR STRIP-MCNC: NEGATIVE MG/DL
HGB UR QL STRIP: NEGATIVE
KETONES UR STRIP-MCNC: 40 MG/DL
LEUKOCYTE ESTERASE UR QL STRIP: NEGATIVE
MUCOUS THREADS #/AREA URNS LPF: PRESENT /LPF
NITRATE UR QL: NEGATIVE
PH UR STRIP: 6 PH (ref 5–7)
RBC #/AREA URNS AUTO: <1 /HPF (ref 0–2)
SOURCE: ABNORMAL
SP GR UR STRIP: 1.02 (ref 1–1.03)
SPECIMEN SOURCE: ABNORMAL
SQUAMOUS #/AREA URNS AUTO: <1 /HPF (ref 0–1)
UROBILINOGEN UR STRIP-MCNC: NORMAL MG/DL (ref 0–2)
WBC #/AREA URNS AUTO: 2 /HPF (ref 0–5)
WET PREP SPEC: ABNORMAL

## 2020-02-13 PROCEDURE — 87210 SMEAR WET MOUNT SALINE/INK: CPT | Performed by: FAMILY MEDICINE

## 2020-02-13 PROCEDURE — G0463 HOSPITAL OUTPT CLINIC VISIT: HCPCS

## 2020-02-13 PROCEDURE — 81001 URINALYSIS AUTO W/SCOPE: CPT | Performed by: FAMILY MEDICINE

## 2020-02-13 PROCEDURE — 99213 OFFICE O/P EST LOW 20 MIN: CPT | Performed by: FAMILY MEDICINE

## 2020-02-13 RX ORDER — ONDANSETRON 2 MG/ML
4 INJECTION INTRAMUSCULAR; INTRAVENOUS EVERY 6 HOURS PRN
Status: DISCONTINUED | OUTPATIENT
Start: 2020-02-13 | End: 2020-02-13 | Stop reason: HOSPADM

## 2020-02-13 RX ORDER — ONDANSETRON 8 MG/1
8 TABLET, FILM COATED ORAL EVERY 8 HOURS PRN
Qty: 30 TABLET | Refills: 3 | Status: SHIPPED | OUTPATIENT
Start: 2020-02-13 | End: 2020-03-18

## 2020-02-13 NOTE — TELEPHONE ENCOUNTER
"Patient callinw1d  Since yesterday had been feeling light headed .  Feeling pressure lower abdomen.  Noticed large amount of discharge yesterday. Still having some discharge.  \"Breasts leaking a lot the 4-5 days\"  Loose stools.  No CX's that she is aware of, just pressure.  Babies very active.  Above message reviewed with Dr Moreno's.   Recommend pt go L&D to be monitored.  To ck urine, wet prep, assess for IV fluids.  L&D and Pt notified.    Gwendolyn Morton RN      "

## 2020-02-13 NOTE — DISCHARGE INSTRUCTIONS
Discharge Instruction for Undelivered Patients      You were seen for: Labor Assessment  We Consulted: Dr. Maria  You had (Test or Medicine):Urine test, monitoring     Diet:   Drink 8 to 12 glasses of liquids (milk, juice, water) every day.  You may eat meals and snacks.     Activity:  Call your doctor or nurse midwife if your baby is moving less than usual.     Call your provider if you notice:  Swelling in your face or increased swelling in your hands or legs.  Headaches that are not relieved by Tylenol (acetaminophen).  Changes in your vision (blurring: seeing spots or stars.)  Nausea (sick to your stomach) and vomiting (throwing up).   Weight gain of 5 pounds or more per week.  Heartburn that doesn't go away.  Signs of bladder infection: pain when you urinate (use the toilet), need to go more often and more urgently.  The bag of carlin (rupture of membranes) breaks, or you notice leaking in your underwear.  Bright red blood in your underwear.  Abdominal (lower belly) or stomach pain.  *If less than 34 weeks: Contractions (tightenings) more than 6 times in one hour.  Increase or change in vaginal discharge (note the color and amount)      Follow-up:  As scheduled in the clinic

## 2020-02-13 NOTE — PLAN OF CARE
UA show some mild dehydration.  Wet prep negative.  Explained to patient.  Will update Dr. Maria for further orders.

## 2020-02-13 NOTE — TELEPHONE ENCOUNTER
zofran called in for patient   Dr. Chela Maria,     Obstetrics and Gynecology  [facility] [place] [facility] [place]

## 2020-02-13 NOTE — H&P
[facility] Labor and Delivery Triage Note    Gay Cummins MRN# [id number]   Age: 34 year old YOB: 1985     Date of Admission:  2020    Primary care provider: Lakshmi Nathan           Chief Complaint:   Gay Cummins is a 34 year old female who is  @ 25w1d pregnant with di-di twins and being seen for dizziness, vaginal pressure and discharge.          Pregnancy history:     OBSTETRIC HISTORY:    OB History    Para Term  AB Living   2 1 1 0 0 1   SAB TAB Ectopic Multiple Live Births   0 0 0 0 1      # Outcome Date GA Lbr Waqas/2nd Weight Sex Delivery Anes PTL Lv   2 Current            1 Term 17 39w5d  3.31 kg (7 lb 4.8 oz) F CS-LTranv EPI N ANGELA      Name: Chayito      Apgar1: 9  Apgar5: 9       EDC: Estimated Date of Delivery: May 27, 2020    Prenatal Labs:   Lab Results   Component Value Date    ABO A 10/11/2019    RH Pos 10/11/2019    AS Neg 10/11/2019    HEPBANG Nonreactive 10/11/2019    CHPCRT Negative 10/11/2019    GCPCRT Negative 10/11/2019    TREPAB Negative 2017    HGB 13.1 10/11/2019       GBS Status:   Lab Results   Component Value Date    GBS  2017     Negative  No GBS DNA detected, presumed negative for GBS or number of bacteria may be   below the limit of detection of the assay.   Assay performed on incubated broth culture of specimen using [employer] real-time   PCR.         Active Problem List  Patient Active Problem List   Diagnosis     Sacroiliac joint pain     Moderate episode of recurrent major depressive disorder (H)     SANDEEP (generalized anxiety disorder)     Dichorionic diamniotic twin pregnancy, antepartum     History of  delivery     Indication for care in labor or delivery     Pelvic pain in pregnancy     Low back pain during pregnancy     Encounter for triage in pregnant patient       Medication Prior to Admission  Medications Prior to Admission   Medication Sig Dispense Refill Last Dose     aspirin (ASA)  81 MG chewable tablet Take 81 mg by mouth daily   2/13/2020 at Unknown time     citalopram (CELEXA) 20 MG tablet Take 10 mg PO daily x 1 wk, then take 20 mg daily 60 tablet 0 2/13/2020 at Unknown time     Prenatal Vit-Fe Fumarate-FA (PRENATAL VITAMIN) 27-0.8 MG TABS    2/13/2020 at Unknown time     hydrOXYzine (ATARAX) 25 MG tablet Take 1 tablet (25 mg) by mouth 3 times daily as needed for anxiety 30 tablet 0 Taking     LORazepam (ATIVAN) 0.5 MG tablet Take 1 tablet (0.5 mg) by mouth every 6 hours as needed for anxiety 12 tablet 0 Taking     ondansetron (ZOFRAN-ODT) 4 MG ODT tab Take 1 tablet (4 mg) by mouth every 8 hours as needed for nausea 20 tablet 1 Taking     polyethylene glycol (MIRALAX/GLYCOLAX) packet Take 1 packet by mouth daily   Taking     sertraline (ZOLOFT) 100 MG tablet Take 1 tablet (100 mg) by mouth daily 60 tablet 0 Taking     sertraline (ZOLOFT) 25 MG tablet Take 50 mg x3 days, then take 25 mg PO x 3 day        sertraline (ZOLOFT) 50 MG tablet Take 1.5 tablets (75 mg) by mouth daily 90 tablet 1 Not Taking   .        Maternal Past Medical History:     Past Medical History:   Diagnosis Date     Anxiety     in High school     Depression     in High school                       Family History:   This patient has no significant family history            Social History:   This patient has no significant social history         Review of Systems:   CONSTITUTIONAL: NEGATIVE for fever, chills, change in weight  INTEGUMENTARY/SKIN: NEGATIVE for worrisome rashes, moles or lesions  EYES: NEGATIVE for vision changes or irritation  ENT/MOUTH: NEGATIVE for ear, mouth and throat problems  RESP: NEGATIVE for significant cough or SOB  BREAST: NEGATIVE for masses, tenderness or discharge  CV: NEGATIVE for chest pain, palpitations or peripheral edema  GI: NEGATIVE for nausea, abdominal pain, heartburn, or change in bowel habits  : NEGATIVE for frequency, dysuria, or hematuria  MUSCULOSKELETAL: NEGATIVE for  significant arthralgias or myalgia  NEURO: NEGATIVE for weakness, dizziness or paresthesias  ENDOCRINE: NEGATIVE for temperature intolerance, skin/hair changes  HEME: NEGATIVE for bleeding problems  PSYCHIATRIC: NEGATIVE for changes in mood or affect          Physical Exam:     Vitals were reviewed  All vitals stable  Patient Vitals for the past 12 hrs:   BP Temp Temp src Resp   02/13/20 1502 122/69 98.2  F (36.8  C) Oral 18     Constitutional: Awake, alert, cooperative, no apparent distress, and appears stated age.  Eyes: Lids and lashes normal, pupils equal, round and reactive to light, extra ocular muscles intact, sclera clear, conjunctiva normal.  ENT: Normocephalic, without obvious abnormality, atramatic, sinuses nontender on palpation, external ears without lesions, oral pharynx with moist mucus membranes, tonsils without erythema or exudates, gums normal and good dentition.  Neck: Supple, symmetrical, trachea midline, no adenopathy, thyroid symmetric, not enlarged and no tenderness, skin normal.  Hematologic / Lymphatic: No cervical lymphadenopathy and no supraclavicular lymphadenopathy.  Back: Symmetric, no curvature, spinous processes are non-tender on palpation, paraspinous muscles are non-tender on palpation, no costal vertebral tenderness.  Lungs: No increased work of breathing, good air exchange, clear to auscultation bilaterally, no crackles or wheezing.  Cardiovascular: Regular rate and rhythm, normal S1 and S2, no S3 or S4, and no murmur noted.  Chest / Breast: Breasts symmetrical, skin without lesion(s), no nipple retraction or dimpling, no nipple discharge, no masses palpated, no axillary or supraclavicular adenopathy.  Abdomen: No scars, normal bowel sounds, soft, non-distended, non-tender, no masses palpated, no hepatosplenomegally.  Genitourinary: No urethral discharge, normal external genitalia, no hernia.  Musculoskeletal: No redness, warmth, or swelling of the joints.  Full range of motion  noted.  Motor strength is 5 out of 5 all extremities bilaterally.  Tone is normal.  Neurologic: Awake, alert, oriented to name, place and time.  Cranial nerves II-XII are grossly intact.  Motor is 5 out of 5 bilaterally.  Cerebellar finger to nose, heel to shin intact.  Sensory is intact.  Babinski down going, Romberg negative, and gait is normal.  Neuropsychiatric: Normal affect, mood, orientation, memory and insight.  Skin: No rashes, erythema, pallor, petechia or purpura.   Cervix:   Membranes: intact   Dilation: closed   Effacement: 0%   Station:-1   Consistency: average   Position: Posterior  Presentation:not addressed   Fetal Heart Rate Tracing: Tier 1 (normal)  Tocometer: no contractions                        Assessment:   Gay Cummins is a 34 year old female who is  @ 25w1d pregnant with di-di twins and being seen for dizziness, vaginal pressure and discharge.  Wet prep and UA negative.  Cervix closed   Patient would like zofran called in         Plan:   discharge home  rx samantha called     Chela Maria, DO

## 2020-02-13 NOTE — PLAN OF CARE
Gay here with c/o of nausea and dizziness today.  Feeling more pelvic pressure today then before.  Both babies very active.  Denies any LOF, bldg, or CTX's.  Feels has been having a lot of vaginal discharge the last couple of days.  Dr. Maria at bedside and cervix visually seen with speculum.  Closed cervix.  Discharge noted and wet prep and UA sent.

## 2020-02-13 NOTE — PROVIDER NOTIFICATION
Dr. Maria updated of UA and wet prep.  Ok to offer IV fluids if Gay wants or encourage her to PO hydrate.  Ok to discharge home

## 2020-02-14 ENCOUNTER — MYC MEDICAL ADVICE (OUTPATIENT)
Dept: OBGYN | Facility: CLINIC | Age: 35
End: 2020-02-14

## 2020-02-14 NOTE — TELEPHONE ENCOUNTER
Please see mychart regarding pelvic pain.  Sounds like pt is already doing what we have suggested and any issues were ruled out in L&D yesterday.    Please advise.    Morena Connell RN

## 2020-02-17 ENCOUNTER — TELEPHONE (OUTPATIENT)
Dept: OBGYN | Facility: CLINIC | Age: 35
End: 2020-02-17

## 2020-02-17 NOTE — TELEPHONE ENCOUNTER
Form received from: [employer]    Form requesting following info/need: STD    TRICIA needed?: No    Location of form: Lna's desk    When completed the route for return: Fax [phone]

## 2020-02-20 ENCOUNTER — THERAPY VISIT (OUTPATIENT)
Dept: PHYSICAL THERAPY | Facility: CLINIC | Age: 35
End: 2020-02-20
Payer: COMMERCIAL

## 2020-02-20 DIAGNOSIS — O26.899 PELVIC PAIN IN PREGNANCY: ICD-10-CM

## 2020-02-20 DIAGNOSIS — M54.50 LOW BACK PAIN DURING PREGNANCY, ANTEPARTUM: ICD-10-CM

## 2020-02-20 DIAGNOSIS — O26.899 LOW BACK PAIN DURING PREGNANCY, ANTEPARTUM: ICD-10-CM

## 2020-02-20 DIAGNOSIS — R10.2 PELVIC PAIN IN PREGNANCY: ICD-10-CM

## 2020-02-20 PROCEDURE — 97140 MANUAL THERAPY 1/> REGIONS: CPT | Mod: GP | Performed by: PHYSICAL THERAPIST

## 2020-02-20 PROCEDURE — 97530 THERAPEUTIC ACTIVITIES: CPT | Mod: GP | Performed by: PHYSICAL THERAPIST

## 2020-02-20 PROCEDURE — 97110 THERAPEUTIC EXERCISES: CPT | Mod: GP | Performed by: PHYSICAL THERAPIST

## 2020-02-26 ENCOUNTER — OFFICE VISIT (OUTPATIENT)
Dept: MATERNAL FETAL MEDICINE | Facility: CLINIC | Age: 35
End: 2020-02-26
Attending: OBSTETRICS & GYNECOLOGY
Payer: COMMERCIAL

## 2020-02-26 ENCOUNTER — HOSPITAL ENCOUNTER (OUTPATIENT)
Dept: ULTRASOUND IMAGING | Facility: CLINIC | Age: 35
Discharge: HOME | End: 2020-02-26
Attending: OBSTETRICS & GYNECOLOGY | Admitting: OBSTETRICS & GYNECOLOGY
Payer: COMMERCIAL

## 2020-02-26 VITALS — OXYGEN SATURATION: 98 % | DIASTOLIC BLOOD PRESSURE: 74 MMHG | SYSTOLIC BLOOD PRESSURE: 115 MMHG | HEART RATE: 90 BPM

## 2020-02-26 DIAGNOSIS — O30.049 DICHORIONIC DIAMNIOTIC TWIN PREGNANCY, ANTEPARTUM: Primary | ICD-10-CM

## 2020-02-26 DIAGNOSIS — O30.049 DICHORIONIC DIAMNIOTIC TWIN PREGNANCY, ANTEPARTUM: ICD-10-CM

## 2020-02-26 PROCEDURE — 76816 OB US FOLLOW-UP PER FETUS: CPT

## 2020-02-26 NOTE — PROGRESS NOTES
"Please see \"Imaging\" tab under \"Chart Review\" for details of today's US at the [facility].    Benedict Harman MD  Maternal-Fetal Medicine    "

## 2020-02-27 ENCOUNTER — PRENATAL OFFICE VISIT (OUTPATIENT)
Dept: OBGYN | Facility: CLINIC | Age: 35
End: 2020-02-27
Payer: COMMERCIAL

## 2020-02-27 VITALS — SYSTOLIC BLOOD PRESSURE: 110 MMHG | BODY MASS INDEX: 30.67 KG/M2 | DIASTOLIC BLOOD PRESSURE: 60 MMHG | WEIGHT: 195.8 LBS

## 2020-02-27 DIAGNOSIS — Z98.891 HISTORY OF CESAREAN DELIVERY: ICD-10-CM

## 2020-02-27 DIAGNOSIS — O26.899 PELVIC PAIN IN PREGNANCY: ICD-10-CM

## 2020-02-27 DIAGNOSIS — R10.2 PELVIC PAIN IN PREGNANCY: ICD-10-CM

## 2020-02-27 DIAGNOSIS — O30.049 DICHORIONIC DIAMNIOTIC TWIN PREGNANCY, ANTEPARTUM: Primary | ICD-10-CM

## 2020-02-27 LAB
ERYTHROCYTE [DISTWIDTH] IN BLOOD BY AUTOMATED COUNT: 13.4 % (ref 10–15)
HCT VFR BLD AUTO: 36.5 % (ref 35–47)
HGB BLD-MCNC: 11.9 G/DL (ref 11.7–15.7)
MCH RBC QN AUTO: 31.8 PG (ref 26.5–33)
MCHC RBC AUTO-ENTMCNC: 32.6 G/DL (ref 31.5–36.5)
MCV RBC AUTO: 98 FL (ref 78–100)
PLATELET # BLD AUTO: 220 10E9/L (ref 150–450)
RBC # BLD AUTO: 3.74 10E12/L (ref 3.8–5.2)
WBC # BLD AUTO: 10.4 10E9/L (ref 4–11)

## 2020-02-27 PROCEDURE — 86780 TREPONEMA PALLIDUM: CPT | Performed by: OBSTETRICS & GYNECOLOGY

## 2020-02-27 PROCEDURE — 90471 IMMUNIZATION ADMIN: CPT | Performed by: OBSTETRICS & GYNECOLOGY

## 2020-02-27 PROCEDURE — 36415 COLL VENOUS BLD VENIPUNCTURE: CPT | Performed by: OBSTETRICS & GYNECOLOGY

## 2020-02-27 PROCEDURE — 82950 GLUCOSE TEST: CPT | Performed by: OBSTETRICS & GYNECOLOGY

## 2020-02-27 PROCEDURE — 99207 ZZC PRENATAL VISIT: CPT | Performed by: OBSTETRICS & GYNECOLOGY

## 2020-02-27 PROCEDURE — 85027 COMPLETE CBC AUTOMATED: CPT | Performed by: OBSTETRICS & GYNECOLOGY

## 2020-02-27 PROCEDURE — 90715 TDAP VACCINE 7 YRS/> IM: CPT | Performed by: OBSTETRICS & GYNECOLOGY

## 2020-02-27 NOTE — PROGRESS NOTES
33yo P2P1 with Di/Di breech/breech twins.  Seen in M yesterday..  Growth concordant.  Polyhydramnios in 1 twin.  Has follow up scan in 4 weeks.    Severe pelvic pain continues.  Seeing PT and chiropractic with modest relief.  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 Disability forms will be completed today.    Anxiety remains with some improvement with switch to celexa.  Will continue.    RTC 2 weeks.

## 2020-02-27 NOTE — NURSING NOTE
"Chief Complaint   Patient presents with     Prenatal Care   27w1d  GCT today    initial /60   Wt 88.8 kg (195 lb 12.8 oz)   LMP  (LMP Unknown)   BMI 30.67 kg/m   Estimated body mass index is 30.67 kg/m  as calculated from the following:    Height as of 1/21/20: 1.702 m (5' 7\").    Weight as of this encounter: 88.8 kg (195 lb 12.8 oz).  BP completed using cuff size regular.  Brittnee Martinez CMA    "

## 2020-02-28 LAB
GLUCOSE 1H P 50 G GLC PO SERPL-MCNC: 100 MG/DL (ref 60–129)
T PALLIDUM AB SER QL: NONREACTIVE

## 2020-02-28 NOTE — TELEPHONE ENCOUNTER
Forms completed by Dr. Coleman and faxed to [facility].  Copy sent to scan and placed in [facility] fax bin.  Brittnee Martinez CMA

## 2020-03-02 ENCOUNTER — TELEPHONE (OUTPATIENT)
Dept: OBGYN | Facility: CLINIC | Age: 35
End: 2020-03-02

## 2020-03-02 NOTE — TELEPHONE ENCOUNTER
Form received from: [employer]    Form requesting following info/need: STD    TRICIA needed?: No    Location of form: Phoebe's desk    When completed the route for return: Fax [phone]

## 2020-03-03 ENCOUNTER — THERAPY VISIT (OUTPATIENT)
Dept: PHYSICAL THERAPY | Facility: CLINIC | Age: 35
End: 2020-03-03
Payer: COMMERCIAL

## 2020-03-03 DIAGNOSIS — M54.50 LOW BACK PAIN DURING PREGNANCY, ANTEPARTUM: ICD-10-CM

## 2020-03-03 DIAGNOSIS — O26.899 PELVIC PAIN IN PREGNANCY: ICD-10-CM

## 2020-03-03 DIAGNOSIS — R10.2 PELVIC PAIN IN PREGNANCY: ICD-10-CM

## 2020-03-03 DIAGNOSIS — O26.899 LOW BACK PAIN DURING PREGNANCY, ANTEPARTUM: ICD-10-CM

## 2020-03-03 PROCEDURE — 97110 THERAPEUTIC EXERCISES: CPT | Mod: GP | Performed by: PHYSICAL THERAPIST

## 2020-03-03 PROCEDURE — 97140 MANUAL THERAPY 1/> REGIONS: CPT | Mod: GP | Performed by: PHYSICAL THERAPIST

## 2020-03-13 ENCOUNTER — OFFICE VISIT (OUTPATIENT)
Dept: MATERNAL FETAL MEDICINE | Facility: CLINIC | Age: 35
End: 2020-03-13
Attending: OBSTETRICS & GYNECOLOGY
Payer: COMMERCIAL

## 2020-03-13 ENCOUNTER — HOSPITAL ENCOUNTER (OUTPATIENT)
Dept: ULTRASOUND IMAGING | Facility: CLINIC | Age: 35
End: 2020-03-13
Attending: OBSTETRICS & GYNECOLOGY
Payer: COMMERCIAL

## 2020-03-13 DIAGNOSIS — O30.049 DICHORIONIC DIAMNIOTIC TWIN PREGNANCY, ANTEPARTUM: ICD-10-CM

## 2020-03-13 DIAGNOSIS — O30.049 DICHORIONIC DIAMNIOTIC TWIN PREGNANCY, ANTEPARTUM: Primary | ICD-10-CM

## 2020-03-13 PROCEDURE — 76815 OB US LIMITED FETUS(S): CPT

## 2020-03-16 PROBLEM — O40.1XX2: Status: ACTIVE | Noted: 2020-03-16

## 2020-03-18 ENCOUNTER — VIRTUAL VISIT (OUTPATIENT)
Dept: OBGYN | Facility: CLINIC | Age: 35
End: 2020-03-18
Payer: COMMERCIAL

## 2020-03-18 DIAGNOSIS — O40.1XX2 POLYHYDRAMNIOS IN FIRST TRIMESTER, FETUS 2 OF MULTIPLE GESTATION: ICD-10-CM

## 2020-03-18 DIAGNOSIS — O30.049 DICHORIONIC DIAMNIOTIC TWIN PREGNANCY, ANTEPARTUM: Primary | ICD-10-CM

## 2020-03-18 DIAGNOSIS — O21.9 VOMITING OR NAUSEA OF PREGNANCY: ICD-10-CM

## 2020-03-18 DIAGNOSIS — Z98.891 HISTORY OF CESAREAN DELIVERY: ICD-10-CM

## 2020-03-18 PROCEDURE — 99207 ZZC PRENATAL VISIT: CPT | Performed by: OBSTETRICS & GYNECOLOGY

## 2020-03-18 RX ORDER — DIPHENHYDRAMINE HCL 25 MG
25 TABLET ORAL EVERY 6 HOURS PRN
COMMUNITY
End: 2020-06-19

## 2020-03-18 RX ORDER — ONDANSETRON 8 MG/1
8 TABLET, FILM COATED ORAL EVERY 8 HOURS PRN
Qty: 30 TABLET | Refills: 3 | Status: SHIPPED | OUTPATIENT
Start: 2020-03-18 | End: 2020-05-05

## 2020-03-18 NOTE — PROGRESS NOTES
"Gay Cummins is a 34 year old female who is being evaluated via a billable telephone visit.      The patient has been notified of following:     \"This telephone visit will be conducted via a call between you and your physician/provider. We have found that certain health care needs can be provided without the need for a physical exam.  This service lets us provide the care you need with a short phone conversation.  If a prescription is necessary we can send it directly to your pharmacy.  If lab work is needed we can place an order for that and you can then stop by our lab to have the test done at a later time.    If during the course of the call the physician/provider feels a telephone visit is not appropriate, you will not be charged for this service.\"       Gay Cummins complains of    Chief Complaint   Patient presents with     Prenatal Care   30w0d  Discsuss delivery plan with twins - tour cancelled  Increase in Nausea - Zofran refill pended  Using Benadryl @     I have reviewed and updated the patient's Past Medical History, Social History, Family History and Medication List.    ALLERGIES  Patient has no known allergies.    Brittnee Martinez CMA      Additional provider notes: Feels well.  Some intermittent nausea.  Zofran refilled.  MFM follow up next week.  Normal BPPs on 3/13 noted.    RTC 2 weeks.  Will need to schedule RCS and BS at that time    Assessment/Plan:  (O30.049) Dichorionic diamniotic twin pregnancy, antepartum  (primary encounter diagnosis)      (O40.1XX2) Polyhydramnios in first trimester, fetus 2 of multiple gestation  Comment: MFM following      (O21.9) Vomiting or nausea of pregnancy  Comment:   Plan: ondansetron (ZOFRAN) 8 MG tablet            (Z98.891) History of  delivery  Comment: Schedule RCS and BS next visit      I have reviewed the note as documented above.  This accurately captures the substance of my conversation with the patient.        Phone call contact time  Call " Started at 08:49  Call Ended at 08:58

## 2020-03-23 ENCOUNTER — MYC MEDICAL ADVICE (OUTPATIENT)
Dept: PEDIATRICS | Facility: CLINIC | Age: 35
End: 2020-03-23

## 2020-03-23 ENCOUNTER — DOCUMENTATION ONLY (OUTPATIENT)
Dept: MATERNAL FETAL MEDICINE | Facility: CLINIC | Age: 35
End: 2020-03-23

## 2020-03-23 ASSESSMENT — ANXIETY QUESTIONNAIRES
1. FEELING NERVOUS, ANXIOUS, OR ON EDGE: SEVERAL DAYS
7. FEELING AFRAID AS IF SOMETHING AWFUL MIGHT HAPPEN: SEVERAL DAYS
GAD7 TOTAL SCORE: 5
3. WORRYING TOO MUCH ABOUT DIFFERENT THINGS: SEVERAL DAYS
4. TROUBLE RELAXING: SEVERAL DAYS
5. BEING SO RESTLESS THAT IT IS HARD TO SIT STILL: NOT AT ALL
6. BECOMING EASILY ANNOYED OR IRRITABLE: NOT AT ALL
2. NOT BEING ABLE TO STOP OR CONTROL WORRYING: SEVERAL DAYS
GAD7 TOTAL SCORE: 5
7. FEELING AFRAID AS IF SOMETHING AWFUL MIGHT HAPPEN: SEVERAL DAYS

## 2020-03-23 NOTE — PROGRESS NOTES
Patient scheduled on 3/25 for Follow Up ultrasound.   reviewed previous US.  Recommendation to reschedule growth US in 2 weeks.        Judy Chaudhry RN

## 2020-03-24 ENCOUNTER — E-VISIT (OUTPATIENT)
Dept: PEDIATRICS | Facility: CLINIC | Age: 35
End: 2020-03-24

## 2020-03-24 DIAGNOSIS — F41.1 GAD (GENERALIZED ANXIETY DISORDER): Primary | ICD-10-CM

## 2020-03-24 PROCEDURE — 98970 NQHP OL DIG ASSMT&MGMT 5-10: CPT | Performed by: NURSE PRACTITIONER

## 2020-03-24 RX ORDER — CITALOPRAM HYDROBROMIDE 40 MG/1
40 TABLET ORAL DAILY
Qty: 180 TABLET | Refills: 1 | Status: SHIPPED | OUTPATIENT
Start: 2020-03-24 | End: 2021-05-13

## 2020-03-24 ASSESSMENT — ANXIETY QUESTIONNAIRES
GAD7 TOTAL SCORE: 5
7. FEELING AFRAID AS IF SOMETHING AWFUL MIGHT HAPPEN: SEVERAL DAYS
5. BEING SO RESTLESS THAT IT IS HARD TO SIT STILL: NOT AT ALL
6. BECOMING EASILY ANNOYED OR IRRITABLE: NOT AT ALL
GAD7 TOTAL SCORE: 5
1. FEELING NERVOUS, ANXIOUS, OR ON EDGE: SEVERAL DAYS
7. FEELING AFRAID AS IF SOMETHING AWFUL MIGHT HAPPEN: SEVERAL DAYS
GAD7 TOTAL SCORE: 5
3. WORRYING TOO MUCH ABOUT DIFFERENT THINGS: SEVERAL DAYS
2. NOT BEING ABLE TO STOP OR CONTROL WORRYING: SEVERAL DAYS
4. TROUBLE RELAXING: SEVERAL DAYS

## 2020-03-25 ASSESSMENT — ANXIETY QUESTIONNAIRES: GAD7 TOTAL SCORE: 5

## 2020-04-01 ENCOUNTER — PRENATAL OFFICE VISIT (OUTPATIENT)
Dept: OBGYN | Facility: CLINIC | Age: 35
End: 2020-04-01
Payer: COMMERCIAL

## 2020-04-01 VITALS — BODY MASS INDEX: 31.64 KG/M2 | WEIGHT: 202 LBS | SYSTOLIC BLOOD PRESSURE: 110 MMHG | DIASTOLIC BLOOD PRESSURE: 64 MMHG

## 2020-04-01 DIAGNOSIS — O30.049 DICHORIONIC DIAMNIOTIC TWIN PREGNANCY, ANTEPARTUM: Primary | ICD-10-CM

## 2020-04-01 DIAGNOSIS — Z98.891 HISTORY OF CESAREAN DELIVERY: ICD-10-CM

## 2020-04-01 DIAGNOSIS — O40.1XX2 POLYHYDRAMNIOS IN FIRST TRIMESTER, FETUS 2 OF MULTIPLE GESTATION: ICD-10-CM

## 2020-04-01 PROCEDURE — 99207 ZZC PRENATAL VISIT: CPT | Performed by: OBSTETRICS & GYNECOLOGY

## 2020-04-01 RX ORDER — BREAST PUMP
1 EACH MISCELLANEOUS PRN
Qty: 1 EACH | Refills: 0 | Status: SHIPPED | OUTPATIENT
Start: 2020-04-01 | End: 2021-06-24

## 2020-04-01 NOTE — PROGRESS NOTES
33yo  with Di/Di breech breech twin gestation.  More pressure - sciatica.  Working with chiropractor.  Off work,  Babies active.  Has MFM follow up 20.    RTC 2 weeks - in face visit advised given twin gestation.

## 2020-04-01 NOTE — NURSING NOTE
"Chief Complaint   Patient presents with     Prenatal Care   32w0d  Twins - c/o vag pressure / like something is going to burst x less than 1 week.  Continues with chiropractic care    initial /64   Wt 91.6 kg (202 lb)   LMP  (LMP Unknown)   BMI 31.64 kg/m   Estimated body mass index is 31.64 kg/m  as calculated from the following:    Height as of 1/21/20: 1.702 m (5' 7\").    Weight as of this encounter: 91.6 kg (202 lb).  BP completed using cuff size regular.  Brittnee Martinez CMA    "

## 2020-04-02 ENCOUNTER — TELEPHONE (OUTPATIENT)
Dept: OBGYN | Facility: CLINIC | Age: 35
End: 2020-04-02

## 2020-04-02 ENCOUNTER — HOSPITAL ENCOUNTER (OUTPATIENT)
Facility: CLINIC | Age: 35
Discharge: HOME | End: 2020-04-02
Attending: FAMILY MEDICINE | Admitting: FAMILY MEDICINE
Payer: COMMERCIAL

## 2020-04-02 VITALS
HEART RATE: 100 BPM | TEMPERATURE: 98.3 F | RESPIRATION RATE: 20 BRPM | SYSTOLIC BLOOD PRESSURE: 116 MMHG | DIASTOLIC BLOOD PRESSURE: 75 MMHG

## 2020-04-02 DIAGNOSIS — M62.830 BACK MUSCLE SPASM: Primary | ICD-10-CM

## 2020-04-02 LAB
ALBUMIN UR-MCNC: NEGATIVE MG/DL
APPEARANCE UR: CLEAR
BACTERIA #/AREA URNS HPF: ABNORMAL /HPF
BILIRUB UR QL STRIP: NEGATIVE
COLOR UR AUTO: ABNORMAL
GLUCOSE UR STRIP-MCNC: NEGATIVE MG/DL
HGB UR QL STRIP: NEGATIVE
KETONES UR STRIP-MCNC: NEGATIVE MG/DL
LEUKOCYTE ESTERASE UR QL STRIP: ABNORMAL
NITRATE UR QL: NEGATIVE
PH UR STRIP: 6 PH (ref 5–7)
RBC #/AREA URNS AUTO: 1 /HPF (ref 0–2)
SOURCE: ABNORMAL
SP GR UR STRIP: 1.01 (ref 1–1.03)
SQUAMOUS #/AREA URNS AUTO: 1 /HPF (ref 0–1)
UROBILINOGEN UR STRIP-MCNC: NORMAL MG/DL (ref 0–2)
WBC #/AREA URNS AUTO: 5 /HPF (ref 0–5)

## 2020-04-02 PROCEDURE — 99213 OFFICE O/P EST LOW 20 MIN: CPT | Performed by: FAMILY MEDICINE

## 2020-04-02 PROCEDURE — 81001 URINALYSIS AUTO W/SCOPE: CPT | Performed by: FAMILY MEDICINE

## 2020-04-02 RX ORDER — HYDROXYZINE HYDROCHLORIDE 10 MG/1
10 TABLET, FILM COATED ORAL
Qty: 10 TABLET | Refills: 0 | Status: ON HOLD | OUTPATIENT
Start: 2020-04-02 | End: 2020-04-19

## 2020-04-02 NOTE — PROVIDER NOTIFICATION
04/02/20 1842   Provider Notification   Provider Name/Title Dr. Maria   Method of Notification Phone   Request Evaluate - Remote   Notification Reason Status Update   SVE, cervix closed/40%/-2. Patient feeling occasional contraction. UA results received. Rates pain at 3 and is OK with going home. Dr. Jimenez updated per phone. Order received for discharge. Dr. Jimenez will call in prescription for Atarax to patient's pharmacy. Patient to follow up at next scheduled doctor visit and to call with any questions. Patient has chiropractic appointment tomorrow. Patient OK with POC.

## 2020-04-02 NOTE — TELEPHONE ENCOUNTER
Patient called back.  Crying with back and pelvic pain and pressure.  Recommend pt go to L&D per recommendations.  L&D notified.  Message sent to on call.  Gwendolyn Morton RN

## 2020-04-02 NOTE — DISCHARGE INSTRUCTIONS
Discharge Instruction for Undelivered Patients      You were seen for: Labor Assessment  We Consulted: Dr. Maria  You had (Test or Medicine):fetal and uterine monitoring, urinalysis and SVE     Diet:   Drink 8 to 12 glasses of liquids (milk, juice, water) every day.  You may eat meals and snacks.     Activity:  As tolerated.  Call your doctor or nurse midwife if your baby is moving less than usual.     Call your provider if you notice:  Swelling in your face or increased swelling in your hands or legs.  Headaches that are not relieved by Tylenol (acetaminophen).  Changes in your vision (blurring: seeing spots or stars.)  Nausea (sick to your stomach) and vomiting (throwing up).   Weight gain of 5 pounds or more per week.  Heartburn that doesn't go away.  Signs of bladder infection: pain when you urinate (use the toilet), need to go more often and more urgently.  The bag of carlin (rupture of membranes) breaks, or you notice leaking in your underwear.  Bright red blood in your underwear.  Abdominal (lower belly) or stomach pain.  For first baby: Contractions (tightening) less than 5 minutes apart for one hour or more.  Second (plus) baby: Contractions (tightening) less than 10 minutes apart and getting stronger.  *If less than 34 weeks: Contractions (tightenings) more than 6 times in one hour.  Increase or change in vaginal discharge (note the color and amount)  Other:  prescription at pharmacy.  Call provider with any questions or concerns.    Follow-up:  As scheduled in the clinic

## 2020-04-02 NOTE — TELEPHONE ENCOUNTER
rx called in for patient     Dr. Chela Maria, DO    Obstetrics and Gynecology  [facility] [place] [facility] [place]

## 2020-04-02 NOTE — TELEPHONE ENCOUNTER
Pt calling with back pain that started yesterday after her appt, so bad that she could not get up.  Back pain is constant, but the pain does increase intermittently.  Intermittent yaz oquendo ctx.    Dizziness started this AM when she gets up and moves around.  She notes she is also trying to keep up with her 2 year old daughter.    Tried Tylenol without relief.  Using heat and ice intermittently to back.    Denies LOF or VB.  Normal fetal movement.      Advised to have someone watch her daughter, drink 2 big glasses of water or more, eat some breakfast and lay down to rest.  Advised to track yaz oquendo and intermittent back pain to see if they corollate.  OK to try Tylenol, continue with ice/heat.  Call back within a couple of hours to update if any change in symptoms.    Morena Connell RN

## 2020-04-02 NOTE — TELEPHONE ENCOUNTER
32w1d   di-di twins      Back pain still pretty consistent.  The intensity of pain is not coming on consistently.      Still lightheaded when upright moving around.  Loss of appetite.  Nausea intermittently.    No cxts, she thinks the random cramping is yaz oquendo.     Nml fetal movement.      Took tylenol, heat pack, and increased fluids with no change to back pain.  Came on suddenly today, hard to walk.  Lower back, more pain on left side.    Nml urination and BMs.    Advised to continue to rest, take tylenol, and push fluids.  Any other suggestion as of right now?    Usha MICHEL R.N.

## 2020-04-02 NOTE — PROVIDER NOTIFICATION
20 1740   Provider Notification   Provider Name/Title Dr. Maria   Method of Notification At Bedside   Request Evaluate in Person   Notification Reason Status Update   , 32 1/7 weeks twin gestation. Complains of sudden onset of lower left back pain staring yesterday afternoon, intermittent cramping the past 2 weeks. Pelvic pressure x 2 dasy, poor appetite the past 2 days and some dizzi ness when getting up. Monitors explained and applied. History and prenatal reviewed. - Dr Jimenez at bedside. Orders received. Urine specimen to lab. Patient rates pain at 7 when up and 3 when lying down.

## 2020-04-03 NOTE — PLAN OF CARE
Discharge instructions reviewed with patient. Understanding verbalized. Discharged to home with  per wheel chair.

## 2020-04-06 ENCOUNTER — TELEPHONE (OUTPATIENT)
Dept: PHYSICAL THERAPY | Facility: CLINIC | Age: 35
End: 2020-04-06

## 2020-04-06 DIAGNOSIS — O26.899 PELVIC PAIN IN PREGNANCY: ICD-10-CM

## 2020-04-06 DIAGNOSIS — M54.50 LOW BACK PAIN DURING PREGNANCY, ANTEPARTUM: ICD-10-CM

## 2020-04-06 DIAGNOSIS — R10.2 PELVIC PAIN IN PREGNANCY: ICD-10-CM

## 2020-04-06 DIAGNOSIS — O26.899 LOW BACK PAIN DURING PREGNANCY, ANTEPARTUM: ICD-10-CM

## 2020-04-07 ENCOUNTER — HOSPITAL ENCOUNTER (OUTPATIENT)
Dept: ULTRASOUND IMAGING | Facility: CLINIC | Age: 35
End: 2020-04-07
Attending: OBSTETRICS & GYNECOLOGY
Payer: COMMERCIAL

## 2020-04-07 ENCOUNTER — OFFICE VISIT (OUTPATIENT)
Dept: MATERNAL FETAL MEDICINE | Facility: CLINIC | Age: 35
End: 2020-04-07
Attending: OBSTETRICS & GYNECOLOGY
Payer: COMMERCIAL

## 2020-04-07 DIAGNOSIS — O30.049 DICHORIONIC DIAMNIOTIC TWIN PREGNANCY, ANTEPARTUM: Primary | ICD-10-CM

## 2020-04-07 DIAGNOSIS — O40.3XX2 POLYHYDRAMNIOS IN THIRD TRIMESTER COMPLICATION, FETUS 2 OF MULTIPLE GESTATION: ICD-10-CM

## 2020-04-07 DIAGNOSIS — O30.049 DICHORIONIC DIAMNIOTIC TWIN PREGNANCY, ANTEPARTUM: ICD-10-CM

## 2020-04-07 PROCEDURE — 76816 OB US FOLLOW-UP PER FETUS: CPT | Mod: 59

## 2020-04-07 NOTE — PROGRESS NOTES
Please see full imaging report from [employer] program under imaging tab.    Persistent polyhydramnios twin 2.   Patient with significant pain issues related to third trimester twin pregnancy with poly  Plans RCS at 38 weeks 0 days  Dr. Quesada using ARYA of 5/27/2020 based on certain DOC and consistent with first trimester twin CRLs.     Christiano Brothers MD  Maternal Fetal Medicine

## 2020-04-08 ENCOUNTER — MYC MEDICAL ADVICE (OUTPATIENT)
Dept: OBGYN | Facility: CLINIC | Age: 35
End: 2020-04-08

## 2020-04-10 ENCOUNTER — PREP FOR PROCEDURE (OUTPATIENT)
Dept: OBGYN | Facility: CLINIC | Age: 35
End: 2020-04-10

## 2020-04-10 ENCOUNTER — TELEPHONE (OUTPATIENT)
Dept: OBGYN | Facility: CLINIC | Age: 35
End: 2020-04-10

## 2020-04-10 DIAGNOSIS — O30.049 DICHORIONIC DIAMNIOTIC TWIN PREGNANCY, ANTEPARTUM: Primary | ICD-10-CM

## 2020-04-10 DIAGNOSIS — Z98.891 HISTORY OF CESAREAN DELIVERY: ICD-10-CM

## 2020-04-10 NOTE — TELEPHONE ENCOUNTER
Type of surgery: repeat  section bilateral salpingectomy  Location of surgery: [facility]  Date and time of surgery: 20 9:00am  Surgeon: Dr. Coleman  Pre-Op Appt Date: @prenatal appt.  Post-Op Appt Date: 20 9:00am   Packet sent out: Yes  Pre-cert/Authorization completed:  Not Applicable  Date: 04/10/20

## 2020-04-10 NOTE — TELEPHONE ENCOUNTER
Procedure name(s) - multi select   Delivery     Reason for procedure  Prior  section and twin pregnancy     Surgeon:  [place]     Is this a multi surgeon case?  No     Additional Procedure:  Bilateral salpingectomy     Laterality  Bilateral     Request for additional equipment  Other (see comments)  None    Anesthesia  Spinal     Positioning (if different from preference card):  Supine     Is the patient known to have any of the following?  None of the above     Initiate Pre-op orders for above procedure:  Yes, as ordered in Epic  additional orders noted there also    Location of Case:  [facility]     Surgical Assistant  Yes     Operating room  requested:  No     Urgency of Surgery:  Routine     Surgeon Procedure Time (incision to closure) in minutes (per procedure as applicable)  60     Note:  Surgical Case Time Needed (in minutes)    Surgery Date:  < 39 weeks (note in comments dx to support <39 week delivery reason)  20    Time of Surgery (Requested):  7:30 AM     Patient Class (for admit prior to surgery, specify number of days in comments):  Surgery admit  admit day of surgery    H&P To Be Completed By:  Surgeon     [facility]      needed?  No     Post-Op Appointment  6 weeks     Vendor Needed?  No

## 2020-04-14 ENCOUNTER — PRENATAL OFFICE VISIT (OUTPATIENT)
Dept: OBGYN | Facility: CLINIC | Age: 35
End: 2020-04-14
Payer: COMMERCIAL

## 2020-04-14 VITALS — BODY MASS INDEX: 31.97 KG/M2 | SYSTOLIC BLOOD PRESSURE: 110 MMHG | DIASTOLIC BLOOD PRESSURE: 70 MMHG | WEIGHT: 204.1 LBS

## 2020-04-14 DIAGNOSIS — R10.2 PELVIC PAIN IN FEMALE: ICD-10-CM

## 2020-04-14 DIAGNOSIS — Z98.891 HISTORY OF CESAREAN DELIVERY: ICD-10-CM

## 2020-04-14 DIAGNOSIS — O40.1XX2 POLYHYDRAMNIOS IN FIRST TRIMESTER, FETUS 2 OF MULTIPLE GESTATION: ICD-10-CM

## 2020-04-14 DIAGNOSIS — O30.049 DICHORIONIC DIAMNIOTIC TWIN PREGNANCY, ANTEPARTUM: Primary | ICD-10-CM

## 2020-04-14 PROCEDURE — 99207 ZZC PRENATAL VISIT: CPT | Performed by: OBSTETRICS & GYNECOLOGY

## 2020-04-14 RX ORDER — CYCLOBENZAPRINE HCL 10 MG
10 TABLET ORAL 3 TIMES DAILY PRN
Qty: 60 TABLET | Refills: 1 | Status: SHIPPED | OUTPATIENT
Start: 2020-04-14 | End: 2020-06-19

## 2020-04-14 NOTE — NURSING NOTE
"Chief Complaint   Patient presents with     Prenatal Care   33w6d  C/o pain - back / ribs / teary today    initial /70   Wt 92.6 kg (204 lb 1.6 oz)   LMP  (LMP Unknown)   BMI 31.97 kg/m   Estimated body mass index is 31.97 kg/m  as calculated from the following:    Height as of 1/21/20: 1.702 m (5' 7\").    Weight as of this encounter: 92.6 kg (204 lb 1.6 oz).  BP completed using cuff size regular.  Brittnee Martinez CMA    "

## 2020-04-14 NOTE — PROGRESS NOTES
36yo  at 33w6d with Di/Di twin gestation.  Severe pelvic pain and anxiety.  On celexa. Trial of flexeril prescribed today.    Babies active.  Twin B with polyhydramnios with MFM following [id number]    Scheduled for RCS an BS at 38 weeks but may need to move up due to poly and/or pain issues.    RTC 2 weeks.  MFM follow up in 1 week

## 2020-04-19 ENCOUNTER — NURSE TRIAGE (OUTPATIENT)
Dept: NURSING | Facility: CLINIC | Age: 35
End: 2020-04-19

## 2020-04-19 ENCOUNTER — HOSPITAL ENCOUNTER (OUTPATIENT)
Facility: CLINIC | Age: 35
LOS: 1 days | Discharge: HOME | End: 2020-04-19
Attending: OBSTETRICS & GYNECOLOGY | Admitting: OBSTETRICS & GYNECOLOGY
Payer: COMMERCIAL

## 2020-04-19 VITALS
SYSTOLIC BLOOD PRESSURE: 120 MMHG | BODY MASS INDEX: 32.02 KG/M2 | HEIGHT: 67 IN | TEMPERATURE: 98.3 F | DIASTOLIC BLOOD PRESSURE: 74 MMHG | RESPIRATION RATE: 20 BRPM | WEIGHT: 204 LBS

## 2020-04-19 DIAGNOSIS — O30.049 DICHORIONIC DIAMNIOTIC TWIN PREGNANCY, ANTEPARTUM: Primary | ICD-10-CM

## 2020-04-19 LAB — RUPTURE OF FETAL MEMBRANES BY ROM PLUS: NEGATIVE

## 2020-04-19 PROCEDURE — 25000132 ZZH RX MED GY IP 250 OP 250 PS 637: Performed by: OBSTETRICS & GYNECOLOGY

## 2020-04-19 PROCEDURE — G0463 HOSPITAL OUTPT CLINIC VISIT: HCPCS

## 2020-04-19 PROCEDURE — 84112 EVAL AMNIOTIC FLUID PROTEIN: CPT | Performed by: OBSTETRICS & GYNECOLOGY

## 2020-04-19 RX ORDER — ACETAMINOPHEN 325 MG/1
650 TABLET ORAL ONCE
Status: COMPLETED | OUTPATIENT
Start: 2020-04-19 | End: 2020-04-19

## 2020-04-19 RX ORDER — NIFEDIPINE 10 MG/1
20 CAPSULE ORAL ONCE
Status: COMPLETED | OUTPATIENT
Start: 2020-04-19 | End: 2020-04-19

## 2020-04-19 RX ORDER — ACETAMINOPHEN 500 MG
1000 TABLET ORAL EVERY 6 HOURS PRN
COMMUNITY
End: 2021-03-17

## 2020-04-19 RX ADMIN — ACETAMINOPHEN 650 MG: 325 TABLET, FILM COATED ORAL at 16:12

## 2020-04-19 RX ADMIN — NIFEDIPINE 20 MG: 10 CAPSULE ORAL at 16:04

## 2020-04-19 ASSESSMENT — MIFFLIN-ST. JEOR: SCORE: 1657.97

## 2020-04-19 NOTE — PROVIDER NOTIFICATION
04/19/20 1711   Provider Notification   Provider Name/Title DR Lc LEARY   Method of Notification Phone   Request Evaluate - Remote   Notification Reason Uterine Activity;Lab/Diagnostic Study;Status Update;SVE        04/19/20 1711   Provider Notification   Provider Name/Title DR Lc LEARY   Method of Notification Phone   Request Evaluate - Remote   Notification Reason Uterine Activity;Lab/Diagnostic Study;Status Update;SVE   discharge orders received. Pt feeling better and reassured she is not ruptured. Headache improved with TYlenol. Pt continues to not feel well from sciatica but states she will take a flexeril when she gets home. Pt instructed to drink more fluids, she admits that she rarely drinks water. She needs to eat lunch/dinner when she gets home, has not eaten much today. Pt verbalizes she feels reassured going home, has no further concerns at this time. Pt understands to be seen in the clinic tomorrow and states she will call the nurse line tonight if feeling worse or any other signs/symptoms. Also noted is pt had large amount of stool in her rectum on VE (palpable),Discreetly talked to pt and she will take her miralax tonight-hx of hemorrhoids and constipation

## 2020-04-19 NOTE — DISCHARGE INSTRUCTIONS
2Discharge Instruction for Undelivered Patients      You were seen for: Labor Assessment and Membrane Assessment  We Consulted: Dr Platt  You had (Test or Medicine):ROM Plus negative, monitoring, nifedipine, oral hydrate     Diet:   Drink 8 to 12 glasses of liquids (milk, juice, water) every day.  You may eat meals and snacks.     Activity:  Rest the pelvic area. No sex. Do not stimulate breasts or nipples.  Stay on bed rest or partial bed rest. This means Rest tonight , side lying preferred, Up for bathroom and meals  Call your doctor or nurse midwife if your baby is moving less than usual.     Call your provider if you notice:  Swelling in your face or increased swelling in your hands or legs.  Headaches that are not relieved by Tylenol (acetaminophen).  Changes in your vision (blurring: seeing spots or stars.)  Nausea (sick to your stomach) and vomiting (throwing up).   Weight gain of 5 pounds or more per week.  Heartburn that doesn't go away.  Signs of bladder infection: pain when you urinate (use the toilet), need to go more often and more urgently.  The bag of carlin (rupture of membranes) breaks, or you notice leaking in your underwear.  Bright red blood in your underwear.  Abdominal (lower belly) or stomach pain.  Second (plus) baby: Contractions (tightening) less than 10 minutes apart and getting stronger.  Increase or change in vaginal discharge (note the color and amount)  Other: Increase fluids and eat as soon as you get home, rest as much as possible    Follow-up:  Make an appointment to be seen on Tomorrow in the clinic

## 2020-04-19 NOTE — PLAN OF CARE
Data: Patient presented to [facility]: 2020  2:23 PM.  Reason for maternal/fetal assessment is uterine contractions, leaking vaginal fluid. Patient reports possible SROM 0730. Felt a trickle when she woke up this morning and has continued to feel an occasional drip.  Patient is a .  Prenatal record reviewed. Pregnancy  has been complicated by polyhydramnios and twins.  Gestational Age 34w4d. VSS. Fetal movement present. Patient denies vaginal bleeding, abdominal pain, nausea, vomiting, headache, visual disturbances, epigastric or URQ pain, significant edema. Support person is present.   Action: Verbal consent for EFM. Triage assessment completed. Bill of rights reviewed.  Response: Patient verbalized agreement with plan. Will contact Dr Paul Platt with update and further orders.  ROM Plus sent, labia appears damp but no fluid on chux

## 2020-04-19 NOTE — TELEPHONE ENCOUNTER
"Pt 34w4d, second pregnancy, having twins    Pt woke this morning at 7:30AM with clear watery vaginal discharge, pt believes her water is leaking as she has a continuous mild flow.  She is also experiencing mild inconsistent contractions that are becoming more noticeable, started at 7:30AM.  No bleeding.  Pt saw PCP earlier this week, had mucousy vaginal discharge at that time.      Disposition:  Per Martin paging instructions, writer directed patient to [facility] L&D.      1:10PM:  Writer spoke with L&D, they advised that pt enter through the main hospital entrance and then proceed to L&D.      She verbalized understanding and had no further questions.     Johanne Luna RN/CHERYL    Reason for Disposition    Leakage of fluid from vagina    Additional Information    Negative: [1] SEVERE abdominal pain (e.g., excruciating) AND [2] constant AND [3] present > 1 hour    Negative: Severe bleeding (e.g., continuous red blood from vagina, or large blood clots)    Negative: Umbilical cord hanging out of the vagina (shiny, white, curled appearance, \"like telephone cord\")    Negative: Uncontrollable urge to push (i.e., feels like baby is coming out now)    Negative: Can see baby    Negative: Sounds like a life-threatening emergency to the triager    Negative: < 20 weeks pregnant    Negative: Vaginal bleeding    Protocols used: PREGNANCY - RUPTURE OF HIFMWEMDE-O-OZ      "

## 2020-04-19 NOTE — PLAN OF CARE
"Pt very anxious, crying. \"wants to be done\". Is upset once I explain Covid restrictions to NICU with one parent at a time. Pt asks if she can stay in a NICU room etc. Questions answered and reassured, empathetic with the pt. Pt was unaware that 34 wk newborns are NICU admissions.  "

## 2020-04-20 ENCOUNTER — PRENATAL OFFICE VISIT (OUTPATIENT)
Dept: OBGYN | Facility: CLINIC | Age: 35
End: 2020-04-20
Payer: COMMERCIAL

## 2020-04-20 VITALS — SYSTOLIC BLOOD PRESSURE: 108 MMHG | BODY MASS INDEX: 32.42 KG/M2 | DIASTOLIC BLOOD PRESSURE: 68 MMHG | WEIGHT: 207 LBS

## 2020-04-20 DIAGNOSIS — O30.049 DICHORIONIC DIAMNIOTIC TWIN PREGNANCY, ANTEPARTUM: Primary | ICD-10-CM

## 2020-04-20 DIAGNOSIS — R10.2 PELVIC PAIN IN PREGNANCY: ICD-10-CM

## 2020-04-20 DIAGNOSIS — O26.899 PELVIC PAIN IN PREGNANCY: ICD-10-CM

## 2020-04-20 DIAGNOSIS — O40.1XX2 POLYHYDRAMNIOS IN FIRST TRIMESTER, FETUS 2 OF MULTIPLE GESTATION: ICD-10-CM

## 2020-04-20 PROBLEM — Z36.89 ENCOUNTER FOR TRIAGE IN PREGNANT PATIENT: Status: RESOLVED | Noted: 2020-02-13 | Resolved: 2020-04-20

## 2020-04-20 PROCEDURE — 99207 ZZC COMPLICATED OB VISIT: CPT | Performed by: OBSTETRICS & GYNECOLOGY

## 2020-04-20 NOTE — NURSING NOTE
"Chief Complaint   Patient presents with     Prenatal Care     34 weeks 5 days- f/u from L&D       Initial /68 (BP Location: Right arm, Patient Position: Sitting, Cuff Size: Adult Regular)   Wt 93.9 kg (207 lb)   LMP  (LMP Unknown)   BMI 32.42 kg/m   Estimated body mass index is 32.42 kg/m  as calculated from the following:    Height as of 20: 1.702 m (5' 7\").    Weight as of this encounter: 93.9 kg (207 lb).  BP completed using cuff size: regular    Questioned patient about current smoking habits.  Pt. has never smoked.          The following HM Due: NONE    34w5d  Dov Trinidad CMA                "

## 2020-04-20 NOTE — PROGRESS NOTES
Pt here for L&D f/u.  Had possible leaking yesterday, was in L&D and ROM Plus was negative.  Cx was reportedly 1 cm.  Has MFM U/S tomorrow.  Recommended starting po Fe supp with her PNV w/ Fe due to COVID-19.  Fetal movement counts BID,  labor/premature rupture of membranes precautions reviewed.  RTC 1 week(s).    Encounter Diagnoses   Name Primary?     Dichorionic diamniotic twin pregnancy, antepartum Yes     Polyhydramnios in first trimester, fetus 2 of multiple gestation      Pelvic pain in pregnancy        Risk factors listed above are stable and being addressed as noted.    Keron Forrest MD  [facility]

## 2020-04-21 ENCOUNTER — OFFICE VISIT (OUTPATIENT)
Dept: MATERNAL FETAL MEDICINE | Facility: CLINIC | Age: 35
End: 2020-04-21
Attending: OBSTETRICS & GYNECOLOGY
Payer: COMMERCIAL

## 2020-04-21 ENCOUNTER — HOSPITAL ENCOUNTER (OUTPATIENT)
Dept: ULTRASOUND IMAGING | Facility: CLINIC | Age: 35
End: 2020-04-21
Attending: OBSTETRICS & GYNECOLOGY
Payer: COMMERCIAL

## 2020-04-21 DIAGNOSIS — O30.049 DICHORIONIC DIAMNIOTIC TWIN PREGNANCY, ANTEPARTUM: ICD-10-CM

## 2020-04-21 DIAGNOSIS — O30.049 DICHORIONIC DIAMNIOTIC TWIN PREGNANCY, ANTEPARTUM: Primary | ICD-10-CM

## 2020-04-21 PROCEDURE — 76815 OB US LIMITED FETUS(S): CPT

## 2020-04-21 NOTE — PROGRESS NOTES
"Please see \"Imaging\" tab under Chart Review for full details.    Carissa Camacho MD  Maternal Fetal Medicine    "

## 2020-04-23 ENCOUNTER — HOSPITAL ENCOUNTER (OUTPATIENT)
Facility: CLINIC | Age: 35
LOS: 1 days | Discharge: HOME | End: 2020-04-23
Attending: OBSTETRICS & GYNECOLOGY | Admitting: OBSTETRICS & GYNECOLOGY
Payer: COMMERCIAL

## 2020-04-23 ENCOUNTER — TELEPHONE (OUTPATIENT)
Dept: OBGYN | Facility: CLINIC | Age: 35
End: 2020-04-23

## 2020-04-23 VITALS
RESPIRATION RATE: 18 BRPM | TEMPERATURE: 98.1 F | DIASTOLIC BLOOD PRESSURE: 62 MMHG | SYSTOLIC BLOOD PRESSURE: 102 MMHG | OXYGEN SATURATION: 97 %

## 2020-04-23 PROBLEM — Z36.89 ENCOUNTER FOR TRIAGE IN PREGNANT PATIENT: Status: ACTIVE | Noted: 2020-04-23

## 2020-04-23 PROCEDURE — 59025 FETAL NON-STRESS TEST: CPT | Mod: 59

## 2020-04-23 PROCEDURE — G0463 HOSPITAL OUTPT CLINIC VISIT: HCPCS | Mod: 25

## 2020-04-23 PROCEDURE — 59025 FETAL NON-STRESS TEST: CPT

## 2020-04-23 NOTE — PROVIDER NOTIFICATION
04/23/20 1840   Provider Notification   Provider Name/Title Dr. Carlton   Method of Notification Phone   Request Evaluate - Remote   Notification Reason Status Update   MD updated on patient arrival, patient feeling increased fetal movements on baby A and baby b, audible movements present on both fetus's, FHT's, and contraction pattern. During phone call, baby B monitoring went to 90's maternal HR being 90's as well.  Fetal movement present during this and appears to be maternal HR that was tracing.  MD reviewing strip, MD agrees it was maternal tracing.  Orders received to discharge to home.

## 2020-04-23 NOTE — TELEPHONE ENCOUNTER
Pt calling, 35.1 weeks with twins.  States she typically feels lots of activity throughout the day.  She has tried eating and drinking then doing kick counts, but only felt a couple of small movements.    Denies LOF or VB, denies ctx.    Advised to go to L&D for monitoring.  L&D notified.  Will notify on call MD as well.    Morena Connell RN

## 2020-04-23 NOTE — PLAN OF CARE
"Data: Patient presented to [facility]: 2020  5:19 PM.  Reason for maternal/fetal assessment is decreased fetal movement. Patient reports \"I haven't felt both baby's move much today, only a couple of times.\"  Patient is a .  Prenatal record reviewed. Pregnancy has been uncomplicated..  Gestational Age 35w1d. VSS. Fetal movement present. Patient denies uterine contractions, leaking of vaginal fluid/rupture of membranes, vaginal bleeding, abdominal pain, pelvic pressure, nausea, vomiting, headache, visual disturbances, epigastric or URQ pain, significant edema. Support person is present.   Action: Verbal consent for EFM. Triage assessment completed. Bill of rights reviewed.  Response: Patient verbalized agreement with plan. Will contact Dr Kayla Carlton with update and further orders.    "

## 2020-04-23 NOTE — DISCHARGE INSTRUCTIONS
Discharge Instruction for Undelivered Patients      You were seen for: Fetal Assessment  We Consulted: Dr. Carlton  You had (Test or Medicine): Fetal monitoring and contraction monitoring      Diet:   Drink 8 to 12 glasses of liquids (milk, juice, water) every day.  You may eat meals and snacks.     Activity:  Call your doctor or nurse midwife if your baby is moving less than usual.     Call your provider if you notice:  Swelling in your face or increased swelling in your hands or legs.  Headaches that are not relieved by Tylenol (acetaminophen).  Changes in your vision (blurring: seeing spots or stars.)  Nausea (sick to your stomach) and vomiting (throwing up).   Weight gain of 5 pounds or more per week.  Heartburn that doesn't go away.  Signs of bladder infection: pain when you urinate (use the toilet), need to go more often and more urgently.  The bag of carlin (rupture of membranes) breaks, or you notice leaking in your underwear.  Bright red blood in your underwear.  Abdominal (lower belly) or stomach pain.  Second (plus) baby: Contractions (tightening) less than 10 minutes apart and getting stronger.  *If less than 34 weeks: Contractions (tightenings) more than 6 times in one hour.  Increase or change in vaginal discharge (note the color and amount)      Follow-up:  As scheduled in the clinic

## 2020-04-24 ENCOUNTER — MYC MEDICAL ADVICE (OUTPATIENT)
Dept: OBGYN | Facility: CLINIC | Age: 35
End: 2020-04-24

## 2020-04-24 NOTE — PLAN OF CARE
Data: Patient assessed in the [facility] for decreased fetal movement.  Cervical exam not examined.  Membranes intact.  Contractions not present.  Action:  Presumed adequate fetal oxygenation documented (see flow record). Discharge instructions reviewed.  Patient instructed to report change in fetal movement, vaginal leaking of fluid or bleeding, abdominal pain, or any concerns related to the pregnancy to her nurse/physician.    Response: Orders to discharge home per Kayla Carlton.  Patient verbalized understanding of education and verbalized agreement with plan. Discharged to home at 1900.

## 2020-04-27 ENCOUNTER — TELEPHONE (OUTPATIENT)
Dept: OBGYN | Facility: CLINIC | Age: 35
End: 2020-04-27

## 2020-04-27 ENCOUNTER — TELEPHONE (OUTPATIENT)
Dept: MATERNAL FETAL MEDICINE | Facility: CLINIC | Age: 35
End: 2020-04-27

## 2020-04-27 NOTE — TELEPHONE ENCOUNTER
Nurse calls from M, pt called crying to [facility] today due to discomfort/pain.      It was noted in 4/21 U/s report that it could be considered to deliver at 37 weeks, they are asking if the CS can be moved up?  Currently scheduled for 39 weeks CS.    Usha MICHEL R.N.

## 2020-04-27 NOTE — TELEPHONE ENCOUNTER
"Gay called our [facility] needing clarification on delivery timing recommendations from our providers.  She is hopeful for a 37 week delivery due to her pain struggles and anxiety. She is currently scheduled for a 38 week repeat C/S.  Gay's most recent US on 2020, states by Dr. Camacho that \"as the patient has had difficulty with pain  and depression/anxiety in this pregnancy, delivery at 37 weeks may be reasonable, but a  delivery is not currently indicated.\"  Dr. Robertson reviewed patient's chart and also agrees that a 37 week delivery is reasonable for this patient.  This was communicated with STEVENSON Michelle at [facility] (pt. Primary OB) and she will rely message to Dr. Coleman in attempt to get patient's currently scheduled C/S changed to 37 weeks gestation.  Patient is aware that this information was communicated with her primary OB Clinic and will follow up with them as needed.  Patient will also keep her scheduled growth US with [facility] on 2020.   "

## 2020-04-28 ENCOUNTER — TELEPHONE (OUTPATIENT)
Dept: OBGYN | Facility: CLINIC | Age: 35
End: 2020-04-28

## 2020-04-28 DIAGNOSIS — O30.049 DICHORIONIC DIAMNIOTIC TWIN PREGNANCY, ANTEPARTUM: Primary | ICD-10-CM

## 2020-04-29 ENCOUNTER — ANESTHESIA EVENT (OUTPATIENT)
Dept: SURGERY | Facility: CLINIC | Age: 35
End: 2020-04-29
Payer: COMMERCIAL

## 2020-04-29 ENCOUNTER — ANESTHESIA (OUTPATIENT)
Dept: SURGERY | Facility: CLINIC | Age: 35
End: 2020-04-29
Payer: COMMERCIAL

## 2020-04-29 ENCOUNTER — HOSPITAL ENCOUNTER (INPATIENT)
Facility: CLINIC | Age: 35
LOS: 3 days | Discharge: HOME | End: 2020-05-02
Attending: OBSTETRICS & GYNECOLOGY | Admitting: OBSTETRICS & GYNECOLOGY
Payer: COMMERCIAL

## 2020-04-29 ENCOUNTER — NURSE TRIAGE (OUTPATIENT)
Dept: NURSING | Facility: CLINIC | Age: 35
End: 2020-04-29

## 2020-04-29 DIAGNOSIS — Z98.891 HISTORY OF CESAREAN DELIVERY: Primary | ICD-10-CM

## 2020-04-29 LAB
ALBUMIN SERPL-MCNC: 2.3 G/DL (ref 3.4–5)
ALP SERPL-CCNC: 159 U/L (ref 40–150)
ALT SERPL W P-5'-P-CCNC: 16 U/L (ref 0–50)
ANION GAP SERPL CALCULATED.3IONS-SCNC: 7 MMOL/L (ref 3–14)
AST SERPL W P-5'-P-CCNC: 25 U/L (ref 0–45)
BILIRUB DIRECT SERPL-MCNC: 0.1 MG/DL (ref 0–0.2)
BILIRUB SERPL-MCNC: 0.4 MG/DL (ref 0.2–1.3)
BUN SERPL-MCNC: 13 MG/DL (ref 7–30)
CALCIUM SERPL-MCNC: 9.2 MG/DL (ref 8.5–10.1)
CHLORIDE SERPL-SCNC: 109 MMOL/L (ref 94–109)
CO2 SERPL-SCNC: 22 MMOL/L (ref 20–32)
CREAT SERPL-MCNC: 0.78 MG/DL (ref 0.52–1.04)
ERYTHROCYTE [DISTWIDTH] IN BLOOD BY AUTOMATED COUNT: 12.5 % (ref 10–15)
GFR SERPL CREATININE-BSD FRML MDRD: >90 ML/MIN/{1.73_M2}
GLUCOSE SERPL-MCNC: 83 MG/DL (ref 70–99)
HCT VFR BLD AUTO: 41.6 % (ref 35–47)
HGB BLD-MCNC: 13.8 G/DL (ref 11.7–15.7)
MCH RBC QN AUTO: 31.7 PG (ref 26.5–33)
MCHC RBC AUTO-ENTMCNC: 33.2 G/DL (ref 31.5–36.5)
MCV RBC AUTO: 96 FL (ref 78–100)
PLATELET # BLD AUTO: 179 10E9/L (ref 150–450)
POTASSIUM SERPL-SCNC: 4.5 MMOL/L (ref 3.4–5.3)
PROT SERPL-MCNC: 6.7 G/DL (ref 6.8–8.8)
RBC # BLD AUTO: 4.35 10E12/L (ref 3.8–5.2)
SARS-COV-2 PCR COMMENT: NORMAL
SARS-COV-2 RNA SPEC QL NAA+PROBE: NEGATIVE
SARS-COV-2 RNA SPEC QL NAA+PROBE: NORMAL
SODIUM SERPL-SCNC: 138 MMOL/L (ref 133–144)
SPECIMEN SOURCE: NORMAL
SPECIMEN SOURCE: NORMAL
WBC # BLD AUTO: 20.1 10E9/L (ref 4–11)

## 2020-04-29 PROCEDURE — 25000128 H RX IP 250 OP 636

## 2020-04-29 PROCEDURE — 58611 LIGATE OVIDUCT(S) ADD-ON: CPT | Performed by: OBSTETRICS & GYNECOLOGY

## 2020-04-29 PROCEDURE — 85018 HEMOGLOBIN: CPT | Performed by: OBSTETRICS & GYNECOLOGY

## 2020-04-29 PROCEDURE — 36000058 ZZH SURGERY LEVEL 3 EA 15 ADDTL MIN: Performed by: OBSTETRICS & GYNECOLOGY

## 2020-04-29 PROCEDURE — 88307 TISSUE EXAM BY PATHOLOGIST: CPT | Mod: 26 | Performed by: OBSTETRICS & GYNECOLOGY

## 2020-04-29 PROCEDURE — 88302 TISSUE EXAM BY PATHOLOGIST: CPT | Performed by: OBSTETRICS & GYNECOLOGY

## 2020-04-29 PROCEDURE — 25000128 H RX IP 250 OP 636: Performed by: NURSE ANESTHETIST, CERTIFIED REGISTERED

## 2020-04-29 PROCEDURE — 25000128 H RX IP 250 OP 636: Performed by: OBSTETRICS & GYNECOLOGY

## 2020-04-29 PROCEDURE — 87635 SARS-COV-2 COVID-19 AMP PRB: CPT | Performed by: OBSTETRICS & GYNECOLOGY

## 2020-04-29 PROCEDURE — 85027 COMPLETE CBC AUTOMATED: CPT | Performed by: OBSTETRICS & GYNECOLOGY

## 2020-04-29 PROCEDURE — 80076 HEPATIC FUNCTION PANEL: CPT | Performed by: OBSTETRICS & GYNECOLOGY

## 2020-04-29 PROCEDURE — 80048 BASIC METABOLIC PNL TOTAL CA: CPT | Performed by: OBSTETRICS & GYNECOLOGY

## 2020-04-29 PROCEDURE — 25000125 ZZHC RX 250: Performed by: NURSE ANESTHETIST, CERTIFIED REGISTERED

## 2020-04-29 PROCEDURE — 36000056 ZZH SURGERY LEVEL 3 1ST 30 MIN: Performed by: OBSTETRICS & GYNECOLOGY

## 2020-04-29 PROCEDURE — 36415 COLL VENOUS BLD VENIPUNCTURE: CPT | Performed by: OBSTETRICS & GYNECOLOGY

## 2020-04-29 PROCEDURE — 25000132 ZZH RX MED GY IP 250 OP 250 PS 637: Performed by: OBSTETRICS & GYNECOLOGY

## 2020-04-29 PROCEDURE — 12000000 ZZH R&B MED SURG/OB

## 2020-04-29 PROCEDURE — 37000009 ZZH ANESTHESIA TECHNICAL FEE, EACH ADDTL 15 MIN: Performed by: OBSTETRICS & GYNECOLOGY

## 2020-04-29 PROCEDURE — 25800030 ZZH RX IP 258 OP 636: Performed by: OBSTETRICS & GYNECOLOGY

## 2020-04-29 PROCEDURE — 25000125 ZZHC RX 250: Performed by: OBSTETRICS & GYNECOLOGY

## 2020-04-29 PROCEDURE — 88302 TISSUE EXAM BY PATHOLOGIST: CPT | Mod: 26 | Performed by: OBSTETRICS & GYNECOLOGY

## 2020-04-29 PROCEDURE — 37000008 ZZH ANESTHESIA TECHNICAL FEE, 1ST 30 MIN: Performed by: OBSTETRICS & GYNECOLOGY

## 2020-04-29 PROCEDURE — 71000012 ZZH RECOVERY PHASE 1 LEVEL 1 FIRST HR: Performed by: OBSTETRICS & GYNECOLOGY

## 2020-04-29 PROCEDURE — 88307 TISSUE EXAM BY PATHOLOGIST: CPT | Performed by: OBSTETRICS & GYNECOLOGY

## 2020-04-29 PROCEDURE — 59510 CESAREAN DELIVERY: CPT | Mod: 22 | Performed by: OBSTETRICS & GYNECOLOGY

## 2020-04-29 PROCEDURE — 25000128 H RX IP 250 OP 636: Performed by: ANESTHESIOLOGY

## 2020-04-29 PROCEDURE — 86850 RBC ANTIBODY SCREEN: CPT | Performed by: OBSTETRICS & GYNECOLOGY

## 2020-04-29 PROCEDURE — 27210794 ZZH OR GENERAL SUPPLY STERILE: Performed by: OBSTETRICS & GYNECOLOGY

## 2020-04-29 PROCEDURE — 0UB70ZZ EXCISION OF BILATERAL FALLOPIAN TUBES, OPEN APPROACH: ICD-10-PCS | Performed by: OBSTETRICS & GYNECOLOGY

## 2020-04-29 RX ORDER — IBUPROFEN 800 MG/1
800 TABLET, FILM COATED ORAL EVERY 6 HOURS
Status: DISCONTINUED | OUTPATIENT
Start: 2020-04-30 | End: 2020-05-02 | Stop reason: HOSPADM

## 2020-04-29 RX ORDER — ONDANSETRON 2 MG/ML
4 INJECTION INTRAMUSCULAR; INTRAVENOUS EVERY 30 MIN PRN
Status: DISCONTINUED | OUTPATIENT
Start: 2020-04-29 | End: 2020-04-29 | Stop reason: HOSPADM

## 2020-04-29 RX ORDER — TRANEXAMIC ACID 10 MG/ML
1 INJECTION, SOLUTION INTRAVENOUS EVERY 30 MIN PRN
Status: DISCONTINUED | OUTPATIENT
Start: 2020-04-29 | End: 2020-05-02 | Stop reason: HOSPADM

## 2020-04-29 RX ORDER — ONDANSETRON 2 MG/ML
INJECTION INTRAMUSCULAR; INTRAVENOUS PRN
Status: DISCONTINUED | OUTPATIENT
Start: 2020-04-29 | End: 2020-04-29

## 2020-04-29 RX ORDER — ONDANSETRON 2 MG/ML
4 INJECTION INTRAMUSCULAR; INTRAVENOUS EVERY 6 HOURS PRN
Status: DISCONTINUED | OUTPATIENT
Start: 2020-04-29 | End: 2020-05-02 | Stop reason: HOSPADM

## 2020-04-29 RX ORDER — ACETAMINOPHEN 325 MG/1
975 TABLET ORAL EVERY 6 HOURS
Status: DISCONTINUED | OUTPATIENT
Start: 2020-04-29 | End: 2020-05-02 | Stop reason: HOSPADM

## 2020-04-29 RX ORDER — HYDROMORPHONE HYDROCHLORIDE 1 MG/ML
.3-.5 INJECTION, SOLUTION INTRAMUSCULAR; INTRAVENOUS; SUBCUTANEOUS EVERY 5 MIN PRN
Status: DISCONTINUED | OUTPATIENT
Start: 2020-04-29 | End: 2020-04-29 | Stop reason: HOSPADM

## 2020-04-29 RX ORDER — OXYCODONE HYDROCHLORIDE 5 MG/1
5 TABLET ORAL EVERY 4 HOURS PRN
Status: DISCONTINUED | OUTPATIENT
Start: 2020-04-29 | End: 2020-05-02 | Stop reason: HOSPADM

## 2020-04-29 RX ORDER — SODIUM CHLORIDE, SODIUM LACTATE, POTASSIUM CHLORIDE, CALCIUM CHLORIDE 600; 310; 30; 20 MG/100ML; MG/100ML; MG/100ML; MG/100ML
INJECTION, SOLUTION INTRAVENOUS CONTINUOUS
Status: DISCONTINUED | OUTPATIENT
Start: 2020-04-29 | End: 2020-04-29 | Stop reason: HOSPADM

## 2020-04-29 RX ORDER — DEXTROSE, SODIUM CHLORIDE, SODIUM LACTATE, POTASSIUM CHLORIDE, AND CALCIUM CHLORIDE 5; .6; .31; .03; .02 G/100ML; G/100ML; G/100ML; G/100ML; G/100ML
INJECTION, SOLUTION INTRAVENOUS CONTINUOUS
Status: DISCONTINUED | OUTPATIENT
Start: 2020-04-29 | End: 2020-05-02 | Stop reason: HOSPADM

## 2020-04-29 RX ORDER — HYDRALAZINE HYDROCHLORIDE 20 MG/ML
2.5-5 INJECTION INTRAMUSCULAR; INTRAVENOUS EVERY 10 MIN PRN
Status: DISCONTINUED | OUTPATIENT
Start: 2020-04-29 | End: 2020-04-29 | Stop reason: HOSPADM

## 2020-04-29 RX ORDER — KETOROLAC TROMETHAMINE 30 MG/ML
30 INJECTION, SOLUTION INTRAMUSCULAR; INTRAVENOUS EVERY 6 HOURS
Status: DISCONTINUED | OUTPATIENT
Start: 2020-04-29 | End: 2020-04-30

## 2020-04-29 RX ORDER — LABETALOL 20 MG/4 ML (5 MG/ML) INTRAVENOUS SYRINGE
40 EVERY 10 MIN PRN
Status: DISCONTINUED | OUTPATIENT
Start: 2020-04-29 | End: 2020-05-02 | Stop reason: HOSPADM

## 2020-04-29 RX ORDER — OXYTOCIN/0.9 % SODIUM CHLORIDE 30/500 ML
100 PLASTIC BAG, INJECTION (ML) INTRAVENOUS CONTINUOUS
Status: DISCONTINUED | OUTPATIENT
Start: 2020-04-29 | End: 2020-05-02 | Stop reason: HOSPADM

## 2020-04-29 RX ORDER — GLYCOPYRROLATE 0.2 MG/ML
INJECTION, SOLUTION INTRAMUSCULAR; INTRAVENOUS PRN
Status: DISCONTINUED | OUTPATIENT
Start: 2020-04-29 | End: 2020-04-29

## 2020-04-29 RX ORDER — LIDOCAINE 40 MG/G
CREAM TOPICAL
Status: DISCONTINUED | OUTPATIENT
Start: 2020-04-29 | End: 2020-04-29 | Stop reason: HOSPADM

## 2020-04-29 RX ORDER — CEFAZOLIN SODIUM 1 G/3ML
1 INJECTION, POWDER, FOR SOLUTION INTRAMUSCULAR; INTRAVENOUS SEE ADMIN INSTRUCTIONS
Status: DISCONTINUED | OUTPATIENT
Start: 2020-04-29 | End: 2020-04-29 | Stop reason: HOSPADM

## 2020-04-29 RX ORDER — NALOXONE HYDROCHLORIDE 0.4 MG/ML
.1-.4 INJECTION, SOLUTION INTRAMUSCULAR; INTRAVENOUS; SUBCUTANEOUS
Status: ACTIVE | OUTPATIENT
Start: 2020-04-29 | End: 2020-04-30

## 2020-04-29 RX ORDER — LIDOCAINE 40 MG/G
CREAM TOPICAL
Status: DISCONTINUED | OUTPATIENT
Start: 2020-04-29 | End: 2020-05-02 | Stop reason: HOSPADM

## 2020-04-29 RX ORDER — DIMENHYDRINATE 50 MG/ML
25 INJECTION, SOLUTION INTRAMUSCULAR; INTRAVENOUS
Status: DISCONTINUED | OUTPATIENT
Start: 2020-04-29 | End: 2020-04-29 | Stop reason: HOSPADM

## 2020-04-29 RX ORDER — LABETALOL 20 MG/4 ML (5 MG/ML) INTRAVENOUS SYRINGE
80 EVERY 10 MIN PRN
Status: DISCONTINUED | OUTPATIENT
Start: 2020-04-29 | End: 2020-05-02 | Stop reason: HOSPADM

## 2020-04-29 RX ORDER — OXYTOCIN/0.9 % SODIUM CHLORIDE 30/500 ML
PLASTIC BAG, INJECTION (ML) INTRAVENOUS PRN
Status: DISCONTINUED | OUTPATIENT
Start: 2020-04-29 | End: 2020-04-29

## 2020-04-29 RX ORDER — LABETALOL 20 MG/4 ML (5 MG/ML) INTRAVENOUS SYRINGE
Status: COMPLETED
Start: 2020-04-29 | End: 2020-04-29

## 2020-04-29 RX ORDER — ONDANSETRON 4 MG/1
4 TABLET, ORALLY DISINTEGRATING ORAL EVERY 30 MIN PRN
Status: DISCONTINUED | OUTPATIENT
Start: 2020-04-29 | End: 2020-04-29 | Stop reason: HOSPADM

## 2020-04-29 RX ORDER — FENTANYL CITRATE 50 UG/ML
25-50 INJECTION, SOLUTION INTRAMUSCULAR; INTRAVENOUS
Status: DISCONTINUED | OUTPATIENT
Start: 2020-04-29 | End: 2020-04-29 | Stop reason: HOSPADM

## 2020-04-29 RX ORDER — OXYTOCIN/0.9 % SODIUM CHLORIDE 30/500 ML
340 PLASTIC BAG, INJECTION (ML) INTRAVENOUS CONTINUOUS PRN
Status: DISCONTINUED | OUTPATIENT
Start: 2020-04-29 | End: 2020-05-02 | Stop reason: HOSPADM

## 2020-04-29 RX ORDER — BUPIVACAINE HYDROCHLORIDE 7.5 MG/ML
INJECTION, SOLUTION INTRASPINAL PRN
Status: DISCONTINUED | OUTPATIENT
Start: 2020-04-29 | End: 2020-04-29

## 2020-04-29 RX ORDER — CEFAZOLIN SODIUM 2 G/100ML
2 INJECTION, SOLUTION INTRAVENOUS
Status: DISCONTINUED | OUTPATIENT
Start: 2020-04-29 | End: 2020-04-29 | Stop reason: HOSPADM

## 2020-04-29 RX ORDER — CITRIC ACID/SODIUM CITRATE 334-500MG
30 SOLUTION, ORAL ORAL
Status: COMPLETED | OUTPATIENT
Start: 2020-04-29 | End: 2020-04-29

## 2020-04-29 RX ORDER — OXYTOCIN 10 [USP'U]/ML
10 INJECTION, SOLUTION INTRAMUSCULAR; INTRAVENOUS
Status: DISCONTINUED | OUTPATIENT
Start: 2020-04-29 | End: 2020-05-02 | Stop reason: HOSPADM

## 2020-04-29 RX ORDER — DEXAMETHASONE SODIUM PHOSPHATE 4 MG/ML
INJECTION, SOLUTION INTRA-ARTICULAR; INTRALESIONAL; INTRAMUSCULAR; INTRAVENOUS; SOFT TISSUE PRN
Status: DISCONTINUED | OUTPATIENT
Start: 2020-04-29 | End: 2020-04-29

## 2020-04-29 RX ORDER — NALOXONE HYDROCHLORIDE 0.4 MG/ML
.1-.4 INJECTION, SOLUTION INTRAMUSCULAR; INTRAVENOUS; SUBCUTANEOUS
Status: DISCONTINUED | OUTPATIENT
Start: 2020-04-29 | End: 2020-05-02 | Stop reason: HOSPADM

## 2020-04-29 RX ORDER — AMOXICILLIN 250 MG
2 CAPSULE ORAL 2 TIMES DAILY
Status: DISCONTINUED | OUTPATIENT
Start: 2020-04-29 | End: 2020-05-02 | Stop reason: HOSPADM

## 2020-04-29 RX ORDER — LABETALOL 20 MG/4 ML (5 MG/ML) INTRAVENOUS SYRINGE
20 EVERY 10 MIN PRN
Status: DISCONTINUED | OUTPATIENT
Start: 2020-04-29 | End: 2020-05-02 | Stop reason: HOSPADM

## 2020-04-29 RX ORDER — SIMETHICONE 80 MG
80 TABLET,CHEWABLE ORAL 4 TIMES DAILY PRN
Status: DISCONTINUED | OUTPATIENT
Start: 2020-04-29 | End: 2020-05-02 | Stop reason: HOSPADM

## 2020-04-29 RX ORDER — BISACODYL 10 MG
10 SUPPOSITORY, RECTAL RECTAL DAILY PRN
Status: DISCONTINUED | OUTPATIENT
Start: 2020-05-01 | End: 2020-05-02 | Stop reason: HOSPADM

## 2020-04-29 RX ORDER — PHENYLEPHRINE HYDROCHLORIDE 10 MG/ML
INJECTION INTRAVENOUS PRN
Status: DISCONTINUED | OUTPATIENT
Start: 2020-04-29 | End: 2020-04-29

## 2020-04-29 RX ORDER — MAGNESIUM HYDROXIDE 1200 MG/15ML
LIQUID ORAL PRN
Status: DISCONTINUED | OUTPATIENT
Start: 2020-04-29 | End: 2020-05-02 | Stop reason: HOSPADM

## 2020-04-29 RX ORDER — AMOXICILLIN 250 MG
1 CAPSULE ORAL 2 TIMES DAILY
Status: DISCONTINUED | OUTPATIENT
Start: 2020-04-29 | End: 2020-05-02 | Stop reason: HOSPADM

## 2020-04-29 RX ORDER — HYDROCORTISONE 2.5 %
CREAM (GRAM) TOPICAL 3 TIMES DAILY PRN
Status: DISCONTINUED | OUTPATIENT
Start: 2020-04-29 | End: 2020-05-02 | Stop reason: HOSPADM

## 2020-04-29 RX ORDER — MODIFIED LANOLIN
OINTMENT (GRAM) TOPICAL
Status: DISCONTINUED | OUTPATIENT
Start: 2020-04-29 | End: 2020-05-02 | Stop reason: HOSPADM

## 2020-04-29 RX ADMIN — OXYTOCIN-SODIUM CHLORIDE 0.9% IV SOLN 30 UNIT/500ML 500 ML: 30-0.9/5 SOLUTION at 09:34

## 2020-04-29 RX ADMIN — DEXAMETHASONE SODIUM PHOSPHATE 4 MG: 4 INJECTION, SOLUTION INTRA-ARTICULAR; INTRALESIONAL; INTRAMUSCULAR; INTRAVENOUS; SOFT TISSUE at 09:14

## 2020-04-29 RX ADMIN — ONDANSETRON HYDROCHLORIDE 4 MG: 2 INJECTION, SOLUTION INTRAVENOUS at 09:14

## 2020-04-29 RX ADMIN — AZITHROMYCIN MONOHYDRATE 500 MG: 500 INJECTION, POWDER, LYOPHILIZED, FOR SOLUTION INTRAVENOUS at 09:24

## 2020-04-29 RX ADMIN — PHENYLEPHRINE HYDROCHLORIDE 100 MCG: 10 INJECTION INTRAVENOUS at 09:37

## 2020-04-29 RX ADMIN — PHENYLEPHRINE HYDROCHLORIDE 100 MCG: 10 INJECTION INTRAVENOUS at 09:23

## 2020-04-29 RX ADMIN — CEFAZOLIN SODIUM 2 G: 2 INJECTION, SOLUTION INTRAVENOUS at 09:08

## 2020-04-29 RX ADMIN — PHENYLEPHRINE HYDROCHLORIDE 100 MCG: 10 INJECTION INTRAVENOUS at 09:13

## 2020-04-29 RX ADMIN — BUPIVACAINE HYDROCHLORIDE IN DEXTROSE 13.5 MG: 7.5 INJECTION, SOLUTION SUBARACHNOID at 09:12

## 2020-04-29 RX ADMIN — HYDRALAZINE HYDROCHLORIDE 5 MG: 20 INJECTION INTRAMUSCULAR; INTRAVENOUS at 12:43

## 2020-04-29 RX ADMIN — LABETALOL 20 MG/4 ML (5 MG/ML) INTRAVENOUS SYRINGE 40 MG: at 12:29

## 2020-04-29 RX ADMIN — HYDROMORPHONE HYDROCHLORIDE 0.5 MG: 1 INJECTION, SOLUTION INTRAMUSCULAR; INTRAVENOUS; SUBCUTANEOUS at 12:15

## 2020-04-29 RX ADMIN — PHENYLEPHRINE HYDROCHLORIDE 100 MCG: 10 INJECTION INTRAVENOUS at 09:52

## 2020-04-29 RX ADMIN — GLYCOPYRROLATE 0.2 MG: 0.2 INJECTION, SOLUTION INTRAMUSCULAR; INTRAVENOUS at 09:14

## 2020-04-29 RX ADMIN — SODIUM CHLORIDE, POTASSIUM CHLORIDE, SODIUM LACTATE AND CALCIUM CHLORIDE: 600; 310; 30; 20 INJECTION, SOLUTION INTRAVENOUS at 10:30

## 2020-04-29 RX ADMIN — Medication 100 ML/HR: at 12:25

## 2020-04-29 RX ADMIN — LABETALOL 20 MG/4 ML (5 MG/ML) INTRAVENOUS SYRINGE 20 MG: at 12:07

## 2020-04-29 RX ADMIN — PHENYLEPHRINE HYDROCHLORIDE 100 MCG: 10 INJECTION INTRAVENOUS at 10:03

## 2020-04-29 RX ADMIN — ACETAMINOPHEN 975 MG: 325 TABLET, FILM COATED ORAL at 21:47

## 2020-04-29 RX ADMIN — SODIUM CITRATE AND CITRIC ACID MONOHYDRATE 30 ML: 500; 334 SOLUTION ORAL at 09:04

## 2020-04-29 RX ADMIN — ACETAMINOPHEN 975 MG: 325 TABLET, FILM COATED ORAL at 12:06

## 2020-04-29 RX ADMIN — KETOROLAC TROMETHAMINE 30 MG: 30 INJECTION, SOLUTION INTRAMUSCULAR at 17:22

## 2020-04-29 RX ADMIN — SODIUM CHLORIDE, POTASSIUM CHLORIDE, SODIUM LACTATE AND CALCIUM CHLORIDE: 600; 310; 30; 20 INJECTION, SOLUTION INTRAVENOUS at 09:03

## 2020-04-29 RX ADMIN — PHENYLEPHRINE HYDROCHLORIDE 100 MCG: 10 INJECTION INTRAVENOUS at 09:43

## 2020-04-29 ASSESSMENT — LIFESTYLE VARIABLES: TOBACCO_USE: 0

## 2020-04-29 ASSESSMENT — ENCOUNTER SYMPTOMS
DYSRHYTHMIAS: 0
STRIDOR: 0
SEIZURES: 0

## 2020-04-29 ASSESSMENT — COPD QUESTIONNAIRES: COPD: 0

## 2020-04-29 NOTE — ANESTHESIA PREPROCEDURE EVALUATION
Anesthesia Pre-Procedure Evaluation    Patient: Gay Cummins   MRN: [id number] : 1985          Preoperative Diagnosis: 36 weeks gestation of pregnancy [Z3A.36]    Procedure(s):   SECTION    Past Medical History:   Diagnosis Date     Anxiety     in High school     Depression     in High school     Past Surgical History:   Procedure Laterality Date     ADENOIDECTOMY  2009      SECTION N/A 2017    Procedure:  SECTION;  priamry  section ;  Surgeon: Chela Maria DO;  Location: [facility] OR      SECTION       CHOLECYSTECTOMY        TOOTH EXTRACTION W/FORCEP       Anesthesia Evaluation     . Pt has had prior anesthetic. Type: Regional and General    History of anesthetic complications   - PONV  post-op nausea for hours      ROS/MED HX    ENT/Pulmonary:  - neg pulmonary ROS    (-) tobacco use, asthma, COPD, CLAIRE risk factors and recent URI   Neurologic:  - neg neurologic ROS    (-) seizures and CVA   Cardiovascular:  - neg cardiovascular ROS      (-) hypertension, CAD, arrhythmias and valvular problems/murmurs   METS/Exercise Tolerance:     Hematologic: Comments: Lab Test        02/27/20     10/11/19     10/08/18                       1444          1047          1022          WBC          10.4         8.1          6.4           HGB          11.9         13.1         13.6          MCV          98           95           94            PLT          220          268          232            Lab Test        11/01/19     10/08/18                       1217          1022          NA            --          140           POTASSIUM     --          4.4           CHLORIDE      --          108           CO2           --          25            BUN           --          10            CR           0.59         0.76          ANIONGAP      --          7             JAKE           --          8.9           GLC           --          79           - neg hematologic  ROS      "  Musculoskeletal:  - neg musculoskeletal ROS       GI/Hepatic:  - neg GI/hepatic ROS      (-) GERD   Renal/Genitourinary:  - ROS Renal section negative       Endo:     (+) Obesity, .   (-) Type I DM, Type II DM, thyroid disease and chronic steroid usage   Psychiatric:  - neg psychiatric ROS   (+) psychiatric history anxiety and depression      Infectious Disease:  - neg infectious disease ROS       Malignancy:      - no malignancy   Other:    (+) Possibly pregnant                         Physical Exam  Normal systems: cardiovascular, pulmonary and dental    Airway   Mallampati: I  TM distance: >3 FB  Neck ROM: full    Dental     Cardiovascular   Rhythm and rate: regular and normal  (-) no friction rub, no systolic click and no murmur    Pulmonary    breath sounds clear to auscultation(-) no rhonchi, no decreased breath sounds, no wheezes, no rales and no stridor            Lab Results   Component Value Date    WBC 10.4 02/27/2020    HGB 11.9 02/27/2020    HCT 36.5 02/27/2020     02/27/2020     10/08/2018    POTASSIUM 4.4 10/08/2018    CHLORIDE 108 10/08/2018    CO2 25 10/08/2018    BUN 10 10/08/2018    CR 0.59 11/01/2019    GLC 79 10/08/2018    JAKE 8.9 10/08/2018    ALBUMIN 3.7 10/08/2018    PROTTOTAL 7.4 10/08/2018    ALT 19 11/01/2019    AST 8 11/01/2019    ALKPHOS 62 10/08/2018    BILITOTAL 0.4 10/08/2018    TSH 0.93 10/08/2018       Preop Vitals  BP Readings from Last 3 Encounters:   04/23/20 102/62   04/20/20 108/68   04/19/20 120/74    Pulse Readings from Last 3 Encounters:   04/02/20 100   02/26/20 90   01/04/20 89      Resp Readings from Last 3 Encounters:   04/29/20 24   04/23/20 18   04/19/20 20    SpO2 Readings from Last 3 Encounters:   04/23/20 97%   02/26/20 98%   01/04/20 98%      Temp Readings from Last 1 Encounters:   04/29/20 97.8  F (36.6  C) (Oral)    Ht Readings from Last 1 Encounters:   04/19/20 1.702 m (5' 7\")      Wt Readings from Last 1 Encounters:   04/20/20 93.9 kg (207 lb)    " "Estimated body mass index is 32.42 kg/m  as calculated from the following:    Height as of 4/19/20: 1.702 m (5' 7\").    Weight as of 4/20/20: 93.9 kg (207 lb).       Anesthesia Plan      History & Physical Review  History and physical reviewed and following examination; no interval change.    ASA Status:  2 .    NPO Status:  > 8 hours    Plan for Spinal   PONV prophylaxis:  Ondansetron (or other 5HT-3) and Dexamethasone or Solumedrol  No ITN due to pprolonged nausea with prev SAB per patient request.        Postoperative Care  Postoperative pain management:  IV analgesics.      Consents  Anesthetic plan, risks, benefits and alternatives discussed with:  Patient and Spouse..                 Jose Park MD                    .  "

## 2020-04-29 NOTE — PROVIDER NOTIFICATION
04/29/20 1302   Provider Notification   Provider Name/Title Dr. Coleman    Method of Notification Phone   Request Evaluate-Remote   /87. Dr. Coleman updated per phone. Order received to hold Apresoline and to notify MD if further severe range BP's.

## 2020-04-29 NOTE — PLAN OF CARE
Pt admitted to 404 in active labor, requiring coaching to breath through her ctx. States she feels like she has to have a bowel movement. SVE 8/100/0 station with BBOW. Reports ctx started at 0600 and are now 2-5 minutes apart. Dr Coleman came to the bedside. Pt is scheduled for a repeat  discussed plan to proceed with a  and bilateral salpingectomy. Procedure was explained including risks. Informed consent was signed. Dr Park started PIV in right arm with 18 g cath. LR is infusing. Abdominal prep with Frank cloth completed and Bicitra 30 ml po given. Prepped for surgery.

## 2020-04-29 NOTE — ANESTHESIA POSTPROCEDURE EVALUATION
Patient: Gay Cummins    Procedure(s):   SECTION    Diagnosis:36 weeks gestation of pregnancy [Z3A.36]  Diagnosis Additional Information: No value filed.    Anesthesia Type:  No value filed.    Note:  Anesthesia Post Evaluation    Patient location during evaluation: Bedside  Patient participation: Able to participate in evaluation but full recovery from regional anesthesia has not yet ocurrred but is anticipated to occur within 48 hours  Level of consciousness: awake  Pain management: adequate  Airway patency: patent  Cardiovascular status: acceptable  Respiratory status: acceptable  Hydration status: acceptable  PONV: controlled     Anesthetic complications: None          Last vitals:  Vitals:    20 1033 20 1035 20 1045   BP: 127/72     Resp: 18     Temp: 97.5  F (36.4  C)     SpO2:  97% 94%         Electronically Signed By: Jose Park MD  2020  10:53 AM

## 2020-04-29 NOTE — PLAN OF CARE
Received call from Rosal[facility] at [facility] to confirm pt results for COVID-19.  Per Rosalba, pt results negative.

## 2020-04-29 NOTE — TELEPHONE ENCOUNTER
Carlos. Date/time moved    Type of surgery: Repeat  section bilateral salpingectomy  Location of surgery: [facility]  Date and time of surgery: 20 7:30am   Surgeon: Dr. Coleman  Pre-Op Appt Date: @prenatal appt  Post-Op Appt Date: 20 9:00am   Packet sent out: Yes  Pre-cert/Authorization completed:  Not Applicable  Date: 20

## 2020-04-29 NOTE — PROVIDER NOTIFICATION
04/29/20 1300   Provider Notification   Provider Name/Title Dr. Coleman   Method of Notification Phone   Request Evaluate-Remote   Apresoline 5 mg given at 1243. /83. Update given, order received to repeat Apresoline 5mg IV. Lab results noted.

## 2020-04-29 NOTE — ANESTHESIA CARE TRANSFER NOTE
Patient: Gay Cummins    Procedure(s):   SECTION    Diagnosis: 36 weeks gestation of pregnancy [Z3A.36]  Diagnosis Additional Information: No value filed.    Anesthesia Type:   No value filed.     Note:    Patient transferred to:Labor and Delivery  Comments: Pt tolerated spinal to L&D recovery VSS report to RNHandoff Report: Identifed the Patient, Identified the Reponsible Provider, Reviewed the pertinent medical history, Discussed the surgical course, Reviewed Intra-OP anesthesia mangement and issues during anesthesia, Set expectations for post-procedure period and Allowed opportunity for questions and acknowledgement of understanding      Vitals: (Last set prior to Anesthesia Care Transfer)    CRNA VITALS  2020 0953 - 2020 1030      2020             Pulse:  85    SpO2:  99 %                Electronically Signed By: TED Whalen CRNA  2020  10:30 AM

## 2020-04-29 NOTE — OP NOTE
Procedure Date: 2020      PREOPERATIVE DIAGNOSES:     1.  Intrauterine pregnancy at 36 weeks gestation.   2.  Dichorionic diamniotic twin gestation.   3.  Active labor.   4.  Breech cephalic presentation.   5.  Prior  section, declines trial of labor after .   6.  Desires permanent sterilization.      POSTOPERATIVE DIAGNOSES:   1.  Intrauterine pregnancy at 36 weeks gestation.   2.  Dichorionic diamniotic twin gestation.   3.  Active labor.   4.  Breech cephalic presentation.   5.  Prior  section, declines trial of labor after .   6.  Desires permanent sterilization.   7.  Delivered.      PROCEDURE:  Repeat low segment transverse  section, bilateral tubal removal for sterilization.      ANESTHESIA:  Spinal.      SURGEON:  Solo Coleman M.D.      OPERATIVE FINDINGS:  Twin A, 4 pound 14 ounce female infant in a vlad breech presentation with Apgars 8 at one minute and 8 at five minutes.  Twin B, 4 pounds 14 ounce male infant, Apgars 8 at one minute, 9 at five minutes, cephalic.  Normal uterus, tubes and ovaries bilaterally.  Normal clear amniotic fluid on both twins.      OPERATIVE INDICATIONS:  Gay Cummins is a 35-year-old female,  2, para 1 at 36 weeks and 0 days gestation who presented on the morning of delivery in spontaneous active labor.  Upon arrival to Labor and Delivery, the presenting she had been having painful contractions for several hours and was 8 cm dilated on initial assessment.  It was her wish to have a repeat  and tubal sterilization.  This was confirmed and preparations were made to go to the operating room urgently.      OPERATIVE FINDINGS:  As above.      OPERATIVE PROCEDURE:  After spinal anesthesia was administered, the patient was placed in the left lateral tilt position, Thornton catheter placed and then prepped and draped in the usual sterile fashion.  Adequacy of the spinal anesthetic was confirmed, and a surgical timeout  conducted.      The patient's preexisting Pfannenstiel skin incision was excised and then the incision extended down to the level of the fascia.  The fascia was opened transversely.  The fascia was bluntly and sharply  from the underlying rectus abdominis muscle superiorly and inferiorly to the pubic symphysis.      The rectus abdominis muscles were bluntly and sharply  in the midline, exposing the peritoneum.  The peritoneal cavity sharply entered.  The peritoneal incision was then extended superiorly to the extent of the dissection and inferiorly to the superior border of the bladder.      The vesicouterine peritoneum was tented and incised and the bladder mobilized off the lower uterine segment.  A transverse incision was made in the lower uterine segment and extended bilaterally with bandage scissors and digital pressure.      Twin A was then delivered from a vlad breech presentation by elevating the breech up to an incision and then delivering the baby without difficulty.  The cord was clamped, cut and the infant handed off to the NICU staff in attendance with findings as noted above after delayed cord clamping was performed.      Artificial rupture of membranes was accomplished for twin B, who presented cephalic.  He was delivered without difficulty.  Again, delayed cord clamping was performed and he was handed off to the NICU staff in attendance with findings as noted above.      Cord blood was then collected from each umbilical cord.  The placentas were then manually extracted from the uterus.  The uterus was exteriorized.  The cut edges and the uterine cavity wiped free of membranes and clot.  The cut edges of the uterus were grasped with ring forceps and the uterine incision closed in 2 layers of 0 Vicryl, the second imbricating the first.  There was an extension along both the left and right side due to the low station of twin A.  Inspection of the uterine incision was performed and  completely hemostatic.      We then used the LigaSure device to excise both tubes and ensure that the area of dissection was hemostatic.  The uterus was then returned to the pelvic cavity, which was irrigated with sterile saline and suctioned until clear.      The fascia was then closed using a running suture of 0 Vicryl.  The subcutaneous tissues irrigated and hemostasis achieved using electrocautery as necessary.  Skin edges were reapproximated using a running subcuticular suture of 4-0 Monocryl.  Steri-Strips and a sterile bandage were placed.  The patient was taken to the recovery room in satisfactory condition.  There were no intraoperative complications.  Babies were transported with mom, did not require NICU admission at the time of this dictation.         ROSALINE PATEL MD             D: 2020   T: 2020   MT: ERIC      Name:     RK FIGUEROA   MRN:      [id number]        Account:        [id number]   :      1985           Procedure Date: 2020      Document: [id number]

## 2020-04-29 NOTE — ANESTHESIA PROCEDURE NOTES
Procedure note : intrathecal  Staff -   Anesthesiologist:  Jose Park MD      Performed By: anesthesiologist    Referred By: ela    Pre-Procedure  Performed by Jose Park MD  Referred by sabal  Location: OR      Pre-Anesthestic Checklist: patient identified, IV checked, site marked, risks and benefits discussed, informed consent, monitors and equipment checked, pre-op evaluation, at physician/surgeon's request and post-op pain management    Timeout  Correct Patient: Yes   Correct Procedure: Yes   Correct Site: Yes   Correct Laterality: Yes and N/A   Correct Position: Yes   Site Marked: Yes, N/A   .   Procedure Documentation  ASA 2  .    Procedure: intrathecal, .   Patient Position:sitting Insertion Site:L3-4  (midline approach)     Patient Prep/Sterile Barriers; povidone-iodine 7.5% surgical scrub.  .  Needle:  Spinal Needle (gauge): 22  Spinal/LP Needle Length (inches): 3.5 # of attempts: 2 and # of redirects:  No introducer used .        Assessment/Narrative  Paresthesias: No.  .  .  clear CSF fluid removed . Time Injected: 09:12

## 2020-04-29 NOTE — PLAN OF CARE
Pt c/o headache and epigastric pain. BP is elevated (see flow sheet. Dr Coleman was notified of severe range BP. Orders received for Labetalol 20 mg IV now (see order). Pt is rating her pain at 10 out of 10. Tylenol given (see EMAR).

## 2020-04-29 NOTE — PLAN OF CARE
Data: Gay Cummins transferred to 454 via cart at 1525. Baby transferred via parent's arms.  Action: Receiving unit notified of transfer: Yes. Patient and family notified of room change. Report given to Mary Anne GAMINO at 1320. Belongings sent to receiving unit. Accompanied by Registered Nurse. Oriented patient to surroundings. Call light within reach. ID bands double-checked with receiving RN.  Response: Patient tolerated transfer and is stable.

## 2020-04-29 NOTE — LACTATION NOTE
LC visit per RN request following delivery of LPT and SGA twins.  Infants both had initial blood sugars within normal range.  BB nursed well prior to lactation visit per nurse report.  BG was sleepy and needed stimulation to latch.  She was able to latch with LC support, but feeding pattern was uncoordinated.  Gay did not feel successful nursing her first baby.  RN had given BG some expressed breastmilk. LC also used hand expression while infant was attempting latches to offer her additional drops of milk.  Excellent volumes are present and expressed.   recommends frequent feeding attempts followed by hand expression first, then pumping.  No need for donor milk at this time, but will need to continue to monitor blood sugar stability and encourage STS time and drops of EBM until interest increases. LC will follow up tomorrow.     Both infants also have tight lingual frenulums further complicating feeds.  MD to evaluate tomorrow to assess for need for frenotomies.

## 2020-04-29 NOTE — PROVIDER NOTIFICATION
04/29/20 1234   Provider Notification   Provider Name/Title Dr. Coleman   Method of Notification Electronic Page   Request Evaluate - Remote   Notification Reason Maternal Vital Sign Change   Text page to Dr. Coleman:    Look at BP's. Pt required 40 mg of Labetalol after first 20 mg. Thanks.

## 2020-04-29 NOTE — TELEPHONE ENCOUNTER
"Patient calling reporting she has been having regular contractions. Reporting contractions are less then 5 minutes apart since 630 a.m. this morning waking from sleep. Patient is 36 weeks gestation with twins.    [facility] notified.    Caller verbalized understanding. Denies further questions.    Beth Urrutia RN  [facility]      Reason for Disposition    Contractions < 10 minutes apart for 1 hour (i.e., 6 or more contractions an hour)    Additional Information    Negative: Passed out (i.e., lost consciousness, collapsed and was not responding)    Negative: Shock suspected (e.g., cold/pale/clammy skin, too weak to stand, low BP, rapid pulse)    Negative: Difficult to awaken or acting confused (e.g., disoriented, slurred speech)    Negative: [1] SEVERE abdominal pain (e.g., excruciating) AND [2] constant AND [3] present > 1 hour    Negative: Severe bleeding (e.g., continuous red blood from vagina, or large blood clots)    Negative: Umbilical cord hanging out of the vagina (shiny, white, curled appearance, \"like telephone cord\")    Negative: Uncontrollable urge to push (i.e., feels like baby is coming out now)    Negative: Can see baby    Negative: Sounds like a life-threatening emergency to the triager    Pregnant < 37 weeks (i.e., )    Negative: Passed out (i.e., lost consciousness, collapsed and was not responding)    Negative: Shock suspected (e.g., cold/pale/clammy skin, too weak to stand, low BP, rapid pulse)    Negative: Difficult to awaken or acting confused (e.g., disoriented, slurred speech)    Negative: [1] SEVERE abdominal pain (e.g., excruciating) AND [2] constant AND [3] present > 1 hour    Negative: Severe bleeding (e.g., continuous red blood from vagina, or large blood clots)    Negative: Umbilical cord hanging out of the vagina (shiny, white, curled appearance, \"like telephone cord\")    Negative: Uncontrollable urge to push (i.e., feels like baby is coming out " now)    Negative: Can see baby    Negative: Sounds like a life-threatening emergency to the triager    Negative: MODERATE-SEVERE abdominal pain    Protocols used: PREGNANCY - LABOR - [id number], PREGNANCY - LABOR-A-AH

## 2020-04-29 NOTE — PROGRESS NOTES
Brief Op Note  Pre-op Dx: 36 wk; Twin gestation, active labor, prior C/S declines TOLAC, breech/cephalic                          Desires sterilization  Post-op Dx: Same, delivered  Name of Procedure: Repeat LST  Section                                             Bilateral tubal removal  Anesthesia:  Spinal  Surgeon:Sabal  Assistants: none  Operative Findings:  Twin A:  4 #, 14 oz female infant with Apgars8 at one minute and 8 at five minutes.  Michael breech  Twin B:  4 #, 14 oz male infant with Apgars8 at one minute and 9 at five minutes  3. Normal uterus and adnexa  QBL: 472cc  Dr. Qasim Coleman

## 2020-04-30 LAB
ALBUMIN SERPL-MCNC: 2 G/DL (ref 3.4–5)
ALP SERPL-CCNC: 120 U/L (ref 40–150)
ALT SERPL W P-5'-P-CCNC: 13 U/L (ref 0–50)
ANION GAP SERPL CALCULATED.3IONS-SCNC: 6 MMOL/L (ref 3–14)
AST SERPL W P-5'-P-CCNC: 24 U/L (ref 0–45)
BILIRUB SERPL-MCNC: 0.3 MG/DL (ref 0.2–1.3)
BUN SERPL-MCNC: 16 MG/DL (ref 7–30)
CALCIUM SERPL-MCNC: 8.4 MG/DL (ref 8.5–10.1)
CHLORIDE SERPL-SCNC: 110 MMOL/L (ref 94–109)
CO2 SERPL-SCNC: 22 MMOL/L (ref 20–32)
COPATH REPORT: NORMAL
CREAT SERPL-MCNC: 0.75 MG/DL (ref 0.52–1.04)
GFR SERPL CREATININE-BSD FRML MDRD: >90 ML/MIN/{1.73_M2}
GLUCOSE SERPL-MCNC: 94 MG/DL (ref 70–99)
HGB BLD-MCNC: 10.2 G/DL (ref 11.7–15.7)
POTASSIUM SERPL-SCNC: 4.1 MMOL/L (ref 3.4–5.3)
PROT SERPL-MCNC: 5.6 G/DL (ref 6.8–8.8)
SODIUM SERPL-SCNC: 138 MMOL/L (ref 133–144)

## 2020-04-30 PROCEDURE — 25000132 ZZH RX MED GY IP 250 OP 250 PS 637: Performed by: OBSTETRICS & GYNECOLOGY

## 2020-04-30 PROCEDURE — 80053 COMPREHEN METABOLIC PANEL: CPT | Performed by: OBSTETRICS & GYNECOLOGY

## 2020-04-30 PROCEDURE — 36415 COLL VENOUS BLD VENIPUNCTURE: CPT | Performed by: OBSTETRICS & GYNECOLOGY

## 2020-04-30 PROCEDURE — 40000083 ZZH STATISTIC IP LACTATION SERVICES 1-15 MIN

## 2020-04-30 PROCEDURE — 25000128 H RX IP 250 OP 636: Performed by: OBSTETRICS & GYNECOLOGY

## 2020-04-30 PROCEDURE — 12000000 ZZH R&B MED SURG/OB

## 2020-04-30 PROCEDURE — 85018 HEMOGLOBIN: CPT | Performed by: OBSTETRICS & GYNECOLOGY

## 2020-04-30 RX ORDER — CITALOPRAM HYDROBROMIDE 20 MG/1
40 TABLET ORAL DAILY
Status: DISCONTINUED | OUTPATIENT
Start: 2020-04-30 | End: 2020-05-02 | Stop reason: HOSPADM

## 2020-04-30 RX ADMIN — SENNOSIDES AND DOCUSATE SODIUM 1 TABLET: 8.6; 5 TABLET ORAL at 10:16

## 2020-04-30 RX ADMIN — OXYCODONE HYDROCHLORIDE 5 MG: 5 TABLET ORAL at 21:23

## 2020-04-30 RX ADMIN — CITALOPRAM HYDROBROMIDE 40 MG: 20 TABLET ORAL at 17:26

## 2020-04-30 RX ADMIN — IBUPROFEN 800 MG: 800 TABLET ORAL at 18:38

## 2020-04-30 RX ADMIN — IBUPROFEN 800 MG: 800 TABLET ORAL at 12:23

## 2020-04-30 RX ADMIN — ACETAMINOPHEN 975 MG: 325 TABLET, FILM COATED ORAL at 10:16

## 2020-04-30 RX ADMIN — SENNOSIDES AND DOCUSATE SODIUM 2 TABLET: 8.6; 5 TABLET ORAL at 02:01

## 2020-04-30 RX ADMIN — IBUPROFEN 800 MG: 800 TABLET ORAL at 06:32

## 2020-04-30 RX ADMIN — SENNOSIDES AND DOCUSATE SODIUM 2 TABLET: 8.6; 5 TABLET ORAL at 21:23

## 2020-04-30 RX ADMIN — ACETAMINOPHEN 975 MG: 325 TABLET, FILM COATED ORAL at 17:26

## 2020-04-30 RX ADMIN — OXYCODONE HYDROCHLORIDE 5 MG: 5 TABLET ORAL at 17:27

## 2020-04-30 RX ADMIN — ACETAMINOPHEN 975 MG: 325 TABLET, FILM COATED ORAL at 23:32

## 2020-04-30 RX ADMIN — KETOROLAC TROMETHAMINE 30 MG: 30 INJECTION, SOLUTION INTRAMUSCULAR at 00:07

## 2020-04-30 RX ADMIN — OXYCODONE HYDROCHLORIDE 5 MG: 5 TABLET ORAL at 13:53

## 2020-04-30 RX ADMIN — ACETAMINOPHEN 975 MG: 325 TABLET, FILM COATED ORAL at 04:15

## 2020-04-30 NOTE — PLAN OF CARE
Patient with BPs 130s-140s/80s-90s, otherwise vitally stable, voiding without issue, tolerating regular diet, ambulating independently. Postpartum checks WDL. Island dressing with marked drainage, unchanged this shift. Pain adequately managed with scheduled Tylenol and Ibuprofen, prn Oxy. Pumping for twins in NICU.  present and supportive. Down to NICU to visit babies.

## 2020-04-30 NOTE — PROVIDER NOTIFICATION
Spoke with Dr. Garcia at nursing station about patient's lab results. Dr. Garcia states to monitor patient's BP q4hr, and to monitor BP q1hr if 150/100. Will update patient on plan of care.

## 2020-04-30 NOTE — LACTATION NOTE
LC visit.  Gay is feeling a bit frustrated with low milk volumes from pumping.  LC provided encouragement, reviewed expectations for volumes, discussed the significance of stimulation and role that hand expression can have on milk supply, and reassured her that all staff will continue to assist with nursing attempts.  LC will be in office again on Monday if further consultation is needed at that time.  All questions were answered.

## 2020-04-30 NOTE — PROGRESS NOTES
SPIRITUAL HEALTH SERVICES Progress Note  [facility]    Spiritual Health telephone contact due to NICU admission.  Left voicemail.  Briefly oriented to Spiritual Health Services and availability for emotional/spiritual support during hospital stay.     Plan: Will follow while on NICU.  Spiritual Health Services remains available for additional emotional/spiritual support by request.    Ricardo Hein MA  Staff   Pager: [phone]  Phone: [phone]

## 2020-04-30 NOTE — CONSULTS
[facility]  MATERNAL CHILD HEALTH   INITIAL NICU PSYCHOSOCIAL ASSESSMENT     DATA:     Reason for Social Work Consult: 36 week twins in NICU.    Presenting Information: SW met with Gay and Faisal who are  and reside in [place] with their almost 3 yr old daughter Chayito. Their  twins are Cari and Nabil and they are prepared for them at home and are not on WIC.      Social Support: Both extended families are nearby and supportive.    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 She hopes to be able to take 12 weeks off maternity leave. Faisal is currently working from home and their families are watching Chayito.    Insurance: Commercial    Pediatrician: [place]    Source of Financial Support: Employment     Mental Health History: Gay has not completed her EPDS at this time. She reports she is on Celexa for anxiety which manages her s/s and she sees a therapist. Gay reports her sleep and appetite are better now that babies have been born.    History of Postpartum Mood Disorders: Yes, Gay reports she had Postpartum Anxiety after Chayito was born.    Chemical Health History: None      INTERVENTION:       SW completed chart review and collaborated with the multidisciplinary team.     Psychosocial Assessment     Introduction to Maternal Child Health  role and scope of practice     Discussed NICU experience and gave NICU welcome card    Reviewed Hospital and Community Resources     Assessed Chemical Health History and Current Symptoms     Assessed Mental Health History and Current Symptoms     Identified stressors, barriers and family concerns     Provided support and active empathetic listening and validation.     Provided psychoeducation on  mood and anxiety disorders, assessed for any current symptoms or history    Provided brochure Depression and Anxiety During and after Pregnancy.  "    ASSESSMENT:     Coping: Well    Affect: appropriate, with good eye contact.    Mood:  Appropriate, stable and calm.    Motivation/Ability to Access Services: Independent in accessing services.    Assessment of Support System: stable and involved    Level of engagement with SW: Engaged and appropriate. Able to seek out SW when needs arise.     Family s understanding of baby s medical situation: appropriate understanding, just disappointed that babies had to transfer to the NICU at \"1 am\" from the nursery but parents are \"impressed\" with NICU.    Family and parent/infant interactions: Parents seem supportive of each other and are bonding with pt as they are able.     Assessment of parental risk for PMAD: Higher than average risk given unexpected NICU admission and Gay's history of PMAD. However she has good insight into her needs and has good support through family/friends, therapist and medication.    Strengths:caring family, willingness to accept help.    Identified Barriers:None at this time     PLAN:     SW will continue to follow throughout pt's Maternal-Child Health Journey as needs arise. SW will continue to collaborate with the multidisciplinary team.    [facility] Case Management  Inpatient   [facility]   [phone]    "

## 2020-04-30 NOTE — PLAN OF CARE
Patient is vitally stable. Denies pre-E S&S. Incisional dressing shows drainage not extended. No S&S of infection noted. SBA transfer to wheelchair. Able to bear weight. Plan to ambulate to bathroom this AM. Urine output concentrated but adequate. Refused IV maintenance fluids per patient's previous nurse. Plan for díaz removal after ambulation to bathroom is verified. Pain is well managed with scheduled Tylenol and Toradol. IV dislodged. Will begin Ibuprofen at 0600. Has pumped several times on this shift.  IPAD in room. SW consult in for NICU admission.

## 2020-04-30 NOTE — PLAN OF CARE
Patient has remained stable this afternoon/evening. In NICU most of it, coming up for blood pressure check, pain medications, and assessment. Utilizing Tylenol, ibuprofen, and oxycodone for pain control. Independent with self cares. Pumping for newborns in NICU. Getting a few mL at a time. Encouragement and support provided.

## 2020-04-30 NOTE — PROGRESS NOTES
[facility]    Obstetrics Post-Op / Progress Note    Assessment & Plan   Assessment:  -1 Day Post-Op  Procedure(s):   SECTION     Labile hypertension overnight  Received hydralazine and labetalol overnight  No mag. Labs pending.    Clean wound without signs of infection.  No immediate surgical complications identified.  No excessive bleeding  Labile hypertension, gestational/postpartum.    Plan:  Ambulation encouraged  Monitor wound for signs of infection  Pain control measures as needed  Reportable signs and symptoms dicussed with the patient  Monitor blood pressure closely. If persistent hypertension requiring meds, start mag and oral meds.    Sally Garcia MD    Interval History   Delivered yesterday, hypertension after delivery. Severe hypertension treated with IV meds, no further issues. One severe range blood pressure this am. Not on mag. Labs ordered this am are pending. Slight headache, trying caffeine per her RN as patient felt this might be the problem. Babies in NICU for BS issues.    Medications     dextrose 5% lactated ringers       labetalol (NORMODYNE/TRANDATE) algorithm-medication instruction       - MEDICATION INSTRUCTIONS -       NO Rho (D) immune globulin (RhoGam) needed - mother Rh POSITIVE       - MEDICATION INSTRUCTIONS -       oxytocin in 0.9% NaCl 100 mL/hr (20 1225)     oxytocin in 0.9% NaCl         acetaminophen  975 mg Oral Q6H     ibuprofen  800 mg Oral Q6H     senna-docusate  1 tablet Oral BID    Or     senna-docusate  2 tablet Oral BID     sodium chloride (PF)  3 mL Intracatheter Q8H       Physical Exam   Temp: 98.4  F (36.9  C) Temp src: Oral BP: (!) 136/91 Pulse: 76 Heart Rate: 77 Resp: 18 SpO2: 98 % O2 Device: None (Room air)    There were no vitals filed for this visit.  Vital Signs with Ranges  Temp:  [97.5  F (36.4  C)-98.6  F (37  C)] 98.4  F (36.9  C)  Pulse:  [] 76  Heart Rate:  [77] 77  Resp:  [16-18] 18  BP: (124-185)/() 136/91  SpO2:   [94 %-100 %] 98 %  I/O last 3 completed shifts:  In: 1000 [I.V.:1000]  Out: 1622 [Urine:1150; Blood:472]    Uterine fundus is firm, non-tender and at the level of the umbilicus  Incision C/D/I  Extremities Non-tender    Data   Recent Labs   Lab Test 10/11/19  1047   ABO A   RH Pos   AS Neg     Recent Labs   Lab Test 04/30/20  0628 04/29/20  1212   HGB 10.2* 13.8     Recent Labs   Lab Test 10/11/19  1047   RUQIGG 10

## 2020-05-01 PROCEDURE — 25000132 ZZH RX MED GY IP 250 OP 250 PS 637: Performed by: OBSTETRICS & GYNECOLOGY

## 2020-05-01 PROCEDURE — 12000000 ZZH R&B MED SURG/OB

## 2020-05-01 RX ADMIN — IBUPROFEN 800 MG: 800 TABLET ORAL at 01:03

## 2020-05-01 RX ADMIN — IBUPROFEN 800 MG: 800 TABLET ORAL at 08:48

## 2020-05-01 RX ADMIN — OXYCODONE HYDROCHLORIDE 5 MG: 5 TABLET ORAL at 20:33

## 2020-05-01 RX ADMIN — ACETAMINOPHEN 975 MG: 325 TABLET, FILM COATED ORAL at 11:54

## 2020-05-01 RX ADMIN — SENNOSIDES AND DOCUSATE SODIUM 1 TABLET: 8.6; 5 TABLET ORAL at 20:33

## 2020-05-01 RX ADMIN — ACETAMINOPHEN 975 MG: 325 TABLET, FILM COATED ORAL at 17:47

## 2020-05-01 RX ADMIN — CITALOPRAM HYDROBROMIDE 40 MG: 20 TABLET ORAL at 20:34

## 2020-05-01 RX ADMIN — IBUPROFEN 800 MG: 800 TABLET ORAL at 20:33

## 2020-05-01 RX ADMIN — OXYCODONE HYDROCHLORIDE 5 MG: 5 TABLET ORAL at 04:48

## 2020-05-01 RX ADMIN — SENNOSIDES AND DOCUSATE SODIUM 1 TABLET: 8.6; 5 TABLET ORAL at 08:48

## 2020-05-01 RX ADMIN — ACETAMINOPHEN 975 MG: 325 TABLET, FILM COATED ORAL at 23:55

## 2020-05-01 RX ADMIN — IBUPROFEN 800 MG: 800 TABLET ORAL at 14:54

## 2020-05-01 NOTE — PLAN OF CARE
Patient stable and meeting expected outcomes.  Blood pressures remain slightly elevated (see flow sheets).  No other complaints of PIH symptoms.  Pumping for infants in NICU.  Up independently and voiding adequately.  Pain well controlled with tylenol, ibuprofen, and oxycodone.  Incision WDL.  Able to perform all cares for self.  Bonding with infants in NICU.  Plan to visit babies in the morning.  May want an early discharge today (5/1).  Will continue to monitor.

## 2020-05-01 NOTE — PROGRESS NOTES
[facility] Obstetrics Post-Op / Progress Note         Assessment and Plan:    Assessment:   Post-operative day #2  Low transverse repeat  section  L&D complications: Di/Di twin gestation, malpresentation and active labor  Repeat  section  Request for permanent sterilization      Doing well.  Clean wound without signs of infection.  No immediate surgical complications identified.  No excessive bleeding  Pain well-controlled.  Both babies in NICU at present  Labile BPs since delivery - has not required treatment since early PP      Plan:   Ambulation encouraged  Monitor wound for signs of infection  Pain control measures as needed  Reportable signs and symptoms dicussed with the patient  Anticipate discharge tomorrow           Interval History:   Doing well.  Pain is well-controlled.  No fevers.  No history of wound drainage, warmth or significant erythema.  Good appetite.  Denies chest pain, shortness of breath, nausea or vomiting.  Ambulatory.           Significant Problems:      Past Medical History:   Diagnosis Date     Anxiety     in High school     Depression     in High school             Review of Systems:    The patient denies any chest pain, shortness of breath, excessive pain, fever, chills, purulent drainage from the wound, nausea or vomiting.          Medications:   All medications related to the patient's surgery have been reviewed          Physical Exam:     All vitals stable  Patient Vitals for the past 12 hrs:   BP Temp Temp src Heart Rate Resp   20 0448 137/78 -- -- 94 16   20 0103 (!) 143/88 98.2  F (36.8  C) Oral 83 16   20 2123 126/68 -- -- 106 16     Wound clean and dry with minimal or no drainage.  Surrounding skin with minimal erythema.  Ext NT with 2+ edema          Data:     Lab Results   Component Value Date    WBC 20.1 (H) 2020    HGB 10.2 (L) 2020    HCT 41.6 2020     2020     2020    POTASSIUM 4.1  04/30/2020    CHLORIDE 110 (H) 04/30/2020    CO2 22 04/30/2020    BUN 16 04/30/2020    CR 0.75 04/30/2020    GLC 94 04/30/2020    AST 24 04/30/2020    ALT 13 04/30/2020    ALKPHOS 120 04/30/2020    BILITOTAL 0.3 04/30/2020     -    Solo Coleman MD  5/1/2020 7:17 AM

## 2020-05-01 NOTE — PLAN OF CARE
Q4 VSS, Bonding well with baby in the NICU via visiting and I-pad. Pain is well controlled with tylenol and ibuprofen. Patient is voiding without difficulty and ambulating independently. Tolerating regular diet well. Independent in self cares. Pumping is going well.

## 2020-05-02 VITALS
HEART RATE: 86 BPM | BODY MASS INDEX: 31.11 KG/M2 | TEMPERATURE: 98 F | OXYGEN SATURATION: 98 % | DIASTOLIC BLOOD PRESSURE: 81 MMHG | WEIGHT: 198.6 LBS | SYSTOLIC BLOOD PRESSURE: 136 MMHG | RESPIRATION RATE: 16 BRPM

## 2020-05-02 PROCEDURE — 25000132 ZZH RX MED GY IP 250 OP 250 PS 637: Performed by: OBSTETRICS & GYNECOLOGY

## 2020-05-02 RX ORDER — AMOXICILLIN 250 MG
1 CAPSULE ORAL DAILY
Qty: 30 TABLET | Refills: 1 | Status: SHIPPED | OUTPATIENT
Start: 2020-05-02 | End: 2020-06-19

## 2020-05-02 RX ORDER — IBUPROFEN 800 MG/1
800 TABLET, FILM COATED ORAL EVERY 8 HOURS PRN
Qty: 60 TABLET | Refills: 1 | Status: SHIPPED | OUTPATIENT
Start: 2020-05-02 | End: 2021-03-17

## 2020-05-02 RX ORDER — OXYCODONE HYDROCHLORIDE 5 MG/1
5 TABLET ORAL EVERY 6 HOURS PRN
Qty: 20 TABLET | Refills: 0 | Status: SHIPPED | OUTPATIENT
Start: 2020-05-02 | End: 2020-06-19

## 2020-05-02 RX ADMIN — ACETAMINOPHEN 975 MG: 325 TABLET, FILM COATED ORAL at 05:59

## 2020-05-02 RX ADMIN — OXYCODONE HYDROCHLORIDE 5 MG: 5 TABLET ORAL at 08:01

## 2020-05-02 RX ADMIN — IBUPROFEN 800 MG: 800 TABLET ORAL at 10:04

## 2020-05-02 RX ADMIN — IBUPROFEN 800 MG: 800 TABLET ORAL at 03:35

## 2020-05-02 RX ADMIN — ACETAMINOPHEN 975 MG: 325 TABLET, FILM COATED ORAL at 12:23

## 2020-05-02 RX ADMIN — SENNOSIDES AND DOCUSATE SODIUM 1 TABLET: 8.6; 5 TABLET ORAL at 10:05

## 2020-05-02 NOTE — PLAN OF CARE
"BP's remain elevated but stable. C/O headache, but rates at \"2\", has had \"for a while\". Denies visual disturbances and epigastric pain. Ibuprofen, oxycodone and scheduled tylenol for pain with stated relief. Tolerating regular diet, denies difficulty voiding. She and FOB spent much of shift in NICU with babies. Pumping with good results, taking EBM to NICU. FOB present and supportive.  "

## 2020-05-02 NOTE — DISCHARGE INSTRUCTIONS
Follow up in 1-2 weeks   Call [phone] for concerns      Call and ask to be seen or talk to a doctor for the following (You can go to the ob triage button on answering service line [phone]):        Increased bleeding, fever, general unwell feeling or increased pain.   Please call for any reason or concern!     Dr. Chela Maria, DO    OB/GYN   [facility]    Preeclampsia   Call your doctor right away if you have any of the following:  - Edema (swelling) in your face or hands  - Rapid weight gain-about 1 pound or more in a day  - Headache  - Abdominal pain on your right side  - Vision problems (flashes or spots)  - You have questions or concerns once you return home.    Postop  Birth Instructions  Return to clinic in one week for follow-up  Lactation [phone]    Activity       Do not lift more than 10 pounds for 6 weeks after surgery.  Ask family and friends for help when you need it.    No driving until you have stopped taking your pain medications (usually two weeks after surgery).    No heavy exercise or activity for 6 weeks.  Don't do anything that will put a strain on your surgery site.    Don't strain when using the toilet.  Your care team may prescribe a stool softener if you have problems with your bowel movements.     To care for your incision:       Keep the incision clean and dry.    Do not soak your incision in water. No swimming or hot tubs until it has fully healed. You may soak in the bathtub if the water level is below your incision.    Do not use peroxide, gel, cream, lotion, or ointment on your incision.    Adjust your clothes to avoid pressure on your surgery site (check the elastic in your underwear for example).     You may see a small amount of clear or pink drainage and this is normal.  Check with your health care provider:       If the drainage increases or has an odor.    If the incision reddens, you have swelling, or develop a  rash.    If you have increased pain and the medicine we prescribed doesn't help.    If you have a fever above 100.4 F (38 C) with or without chills when placing thermometer under your tongue.   The area around your incision (surgery wound), will feel numb.  This is normal. The numbness should go away in less than a year.     Keep your hands clean:  Always wash your hands before touching your incision (surgery wound). This helps reduce your risk of infection. If your hands aren't dirty, you may use an alcohol hand-rub to clean your hands. Keep your nails clean and short.    Call your healthcare provider if you have any of these symptoms:       You soak a sanitary pad with blood within 1 hour, or you see blood clots larger than a golf ball.    Bleeding that lasts more than 6 weeks.    Vaginal discharge that smells bad.    Severe pain, cramping or tenderness in your lower belly area.    A need to urinate more frequently (use the toilet more often), more urgently (use the toilet very quickly), or it burns when you urinate.    Nausea and vomiting.    Redness, swelling or pain around a vein in your leg.    Problems breastfeeding or a red or painful area on your breast.    Chest pain and cough or are gasping for air.    Problems with coping with sadness, anxiety or depression. If you have concerns about hurting yourself or the baby, call your provider immediately.      You have questions or concerns after you return home.

## 2020-05-02 NOTE — PLAN OF CARE
Data: Vital signs within normal limits. Postpartum checks within normal limits - see flow record. Patient eating and drinking normally. Patient able to empty bladder independently and is up ambulating. No apparent signs of infection. Patient performing self cares and is able to care for infants in the NICU.  Action: Patient medicated during the shift for pain. See MAR. Patient reassessed within 1 hour after each medication and pain was improved - patient stated she was comfortable. Patient education completed. See flow record.  Response: Positive attachment behaviors observed with infants. Support persons father of infant present and attentive to both mother and infant needs.   Plan: Discharged to home today at 14:30 with all patient belongings. AVS read to patient. All questions and concerns addressed.

## 2020-05-02 NOTE — PLAN OF CARE
Data: Vital signs within normal limits. Postpartum checks within normal limits, see flow record. Patient eating and drinking normally. Patient able to empty bladder independently and is up ambulating. No apparent signs of infection. Incision healing well. Patient performing self cares and is pumping breast milk, able to care for infants in NICU. Some nipple tenderness.   Action: Patient medicated during the shift for  headache .  Denies any visual or epigastric symptoms. See MAR. Patient reassessed within 1 hour after each medication and pain was improved, patient stated she was comfortable. Patient education done about self cares, breast pumping, pain management and prevention. See flow record. Hydrogel pads and mother's love cream given.   Response: Positive attachment behaviors observed with infant. Support person present and attentive to patient and infants in NICU.   Plan: Anticipate discharge on 05/02/20.

## 2020-05-02 NOTE — PROGRESS NOTES
[facility]   Obstetrics Post-Op / Progress Note         Assessment and Plan:    Assessment:   Post-operative day #3  Low transverse repeat  section  Post-partum tubal ligation  L&D complications: 36 weeks gestation of pregnancy [Z3A.36]      Doing well.  Pain well-controlled.      Plan:   Ambulation encouraged  Discharge later today            Interval History:     Doing well.  Pain is well-controlled.  No fevers.  No history of wound drainage, warmth or significant erythema.  Good appetite.  Denies chest pain, shortness of breath, nausea or vomiting.  Ambulatory.  Breastfeeding well.          Significant Problems:    None          Review of Systems:    CONSTITUTIONAL: NEGATIVE for fever, chills, change in weight  INTEGUMENTARY/SKIN: NEGATIVE for worrisome rashes, moles or lesions  EYES: NEGATIVE for vision changes or irritation  ENT/MOUTH: NEGATIVE for ear, mouth and throat problems  RESP: NEGATIVE for significant cough or SOB  BREAST: NEGATIVE for masses, tenderness or discharge  CV: NEGATIVE for chest pain, palpitations or peripheral edema  GI: NEGATIVE for nausea, abdominal pain, heartburn, or change in bowel habits  : NEGATIVE for frequency, dysuria, or hematuria  MUSCULOSKELETAL: NEGATIVE for significant arthralgias or myalgia  NEURO: NEGATIVE for weakness, dizziness or paresthesias  ENDOCRINE: NEGATIVE for temperature intolerance, skin/hair changes  HEME: NEGATIVE for bleeding problems  PSYCHIATRIC: NEGATIVE for changes in mood or affect          Medications:   All medications related to the patient's surgery have been reviewed  -          Physical Exam:     All vitals stable  Patient Vitals for the past 12 hrs:   BP Temp Temp src Pulse Resp Weight   20 0742 136/81 98  F (36.7  C) Oral 86 16 --   20 0559 -- -- -- -- -- 90.1 kg (198 lb 9.6 oz)   20 0335 135/85 -- -- 83 -- --   20 2342 (!) 151/85 97.9  F (36.6  C) Oral 80 16 --     Wound clean and dry with minimal  or no drainage.  Surrounding skin with minimal erythema.          Data:     All laboratory data related to this surgery reviewed  Hemoglobin   Date Value Ref Range Status   04/30/2020 10.2 (L) 11.7 - 15.7 g/dL Final   04/29/2020 13.8 11.7 - 15.7 g/dL Final   02/27/2020 11.9 11.7 - 15.7 g/dL Final   10/11/2019 13.1 11.7 - 15.7 g/dL Final   10/08/2018 13.6 11.7 - 15.7 g/dL Final     -    DO Dr. Chela Dunn, DO    OB/GYN   [facility]

## 2020-05-02 NOTE — DISCHARGE SUMMARY
34 y/o  2 s/p [facility] POD # 3 twin gestation with bilateral salpingectomy    Hemoglobin   Date Value Ref Range Status   2020 10.2 (L) 11.7 - 15.7 g/dL Final   ]    d/c home   On jarek colace, breast pump and ibuprofen oxycodone   Follow-up in 1-2 weeks for post op check   Follow-up in 6 weeks for post partum examination    [facility]   Obstetrics Post-Op / Progress Note         Assessment and Plan:    Assessment:   Post-operative day #3  Low transverse repeat  section  Post-partum tubal ligation  L&D complications: 36 weeks gestation of pregnancy [Z3A.36]      Doing well.  Pain well-controlled.      Plan:   Ambulation encouraged  Discharge later today            Interval History:     Doing well.  Pain is well-controlled.  No fevers.  No history of wound drainage, warmth or significant erythema.  Good appetite.  Denies chest pain, shortness of breath, nausea or vomiting.  Ambulatory.  Breastfeeding well.          Significant Problems:    None          Review of Systems:    CONSTITUTIONAL: NEGATIVE for fever, chills, change in weight  INTEGUMENTARY/SKIN: NEGATIVE for worrisome rashes, moles or lesions  EYES: NEGATIVE for vision changes or irritation  ENT/MOUTH: NEGATIVE for ear, mouth and throat problems  RESP: NEGATIVE for significant cough or SOB  BREAST: NEGATIVE for masses, tenderness or discharge  CV: NEGATIVE for chest pain, palpitations or peripheral edema  GI: NEGATIVE for nausea, abdominal pain, heartburn, or change in bowel habits  : NEGATIVE for frequency, dysuria, or hematuria  MUSCULOSKELETAL: NEGATIVE for significant arthralgias or myalgia  NEURO: NEGATIVE for weakness, dizziness or paresthesias  ENDOCRINE: NEGATIVE for temperature intolerance, skin/hair changes  HEME: NEGATIVE for bleeding problems  PSYCHIATRIC: NEGATIVE for changes in mood or affect          Medications:   All medications related to the patient's surgery have been reviewed  -          Physical Exam:      All vitals stable  Patient Vitals for the past 12 hrs:   BP Temp Temp src Pulse Resp Weight   05/02/20 0742 136/81 98  F (36.7  C) Oral 86 16 --   05/02/20 0559 -- -- -- -- -- 90.1 kg (198 lb 9.6 oz)   05/02/20 0335 135/85 -- -- 83 -- --   05/01/20 2342 (!) 151/85 97.9  F (36.6  C) Oral 80 16 --     Wound clean and dry with minimal or no drainage.  Surrounding skin with minimal erythema.          Data:     All laboratory data related to this surgery reviewed  Hemoglobin   Date Value Ref Range Status   04/30/2020 10.2 (L) 11.7 - 15.7 g/dL Final   04/29/2020 13.8 11.7 - 15.7 g/dL Final   02/27/2020 11.9 11.7 - 15.7 g/dL Final   10/11/2019 13.1 11.7 - 15.7 g/dL Final   10/08/2018 13.6 11.7 - 15.7 g/dL Final     -    Chela Maria, DO      Dr. Chela Maria, DO    OB/GYN   [facility]

## 2020-05-04 ENCOUNTER — LACTATION ENCOUNTER (OUTPATIENT)
Age: 35
End: 2020-05-04

## 2020-05-04 NOTE — LACTATION NOTE
This note was copied from a baby's chart.  LC visit.  Baby michel Ferrer has not been as interested in nursing per Gay and RN report. He has started to transfer small volumes.  LC assisted with placing him to breast. He requires stimulation and breast massage to keep in a nutritive suck pattern.  He did not use a nipple shield for the noon feeding on the left side.  LC reviewed breastfeeding positioning, stimulation, and pumping post feeds. He has a tight lingual frenulum as well.  He moves his jaw in an up and down motion rather than a rocking pattern, but did have some nice swallows.  Plan for continued support with breastfeeding and pumping.  LC would consider a frenotomy.

## 2020-05-04 NOTE — LACTATION NOTE
This note was copied from a baby's chart.  LC visit and attempt at breastfeeding. She transferred a large volume for the previous feeding and would not awaken or open her mouth for this feeding attempt.  LC does note a tight lingual frenulum.  NNP is aware and will evaluate.  Gay was pleased with infant progress and milk transfer earlier today.  LC provided education about typical nursing patterns and expectations.  Plan for continued support, pumping post feeds, and working on nursing with and without the nipple shield depending on transfer volumes.

## 2020-05-05 ENCOUNTER — OFFICE VISIT (OUTPATIENT)
Dept: OBGYN | Facility: CLINIC | Age: 35
End: 2020-05-05
Payer: COMMERCIAL

## 2020-05-05 VITALS — BODY MASS INDEX: 29.9 KG/M2 | DIASTOLIC BLOOD PRESSURE: 82 MMHG | WEIGHT: 190.9 LBS | SYSTOLIC BLOOD PRESSURE: 132 MMHG

## 2020-05-05 DIAGNOSIS — Z48.89 ENCOUNTER FOR POSTOPERATIVE WOUND CHECK: Primary | ICD-10-CM

## 2020-05-05 PROBLEM — O40.1XX2: Status: RESOLVED | Noted: 2020-03-16 | Resolved: 2020-04-29

## 2020-05-05 PROBLEM — O26.899 PELVIC PAIN IN PREGNANCY: Status: RESOLVED | Noted: 2020-02-11 | Resolved: 2020-04-29

## 2020-05-05 PROBLEM — R10.2 PELVIC PAIN IN PREGNANCY: Status: RESOLVED | Noted: 2020-02-11 | Resolved: 2020-04-29

## 2020-05-05 PROBLEM — O30.049 DICHORIONIC DIAMNIOTIC TWIN PREGNANCY, ANTEPARTUM: Status: RESOLVED | Noted: 2019-11-01 | Resolved: 2020-04-29

## 2020-05-05 PROCEDURE — 99024 POSTOP FOLLOW-UP VISIT: CPT | Performed by: OBSTETRICS & GYNECOLOGY

## 2020-05-05 NOTE — PROGRESS NOTES
Post Op Follow Up    Gay Cummins is here for post op follow up visit following RCS and BS  on 4/29/20.  She had some labile BPs PP so we will also be doing a BP check.  She was not discharged with antihypertensive therapy and PP PIH labs were normal.    /82.  Diuresing remarkably    Procedure was uncomplicated.  She had transient PP HTN which required 2 doses of IV labetalol and 1 dose of hydralzine  Final pathology demonstrated normal tubes    Since surgery patient has not had complaints.  Babies in the NICU working on feeding issues.  Should be home by the end of the week..    Activity, bowel function and bladder function have all returned to normal.    EXAM:  Abdomen: Abdomen soft, non-tender. BS normal. No masses, organomegaly.  Incision CDI     Follow up in 6 week(s) for virtual PP visit    Qasim Coleman MD

## 2020-06-19 ENCOUNTER — VIRTUAL VISIT (OUTPATIENT)
Dept: OBGYN | Facility: CLINIC | Age: 35
End: 2020-06-19

## 2020-06-19 VITALS — WEIGHT: 179 LBS | BODY MASS INDEX: 28.04 KG/M2

## 2020-06-19 PROCEDURE — 99207 ZZC POST PARTUM EXAM: CPT | Performed by: OBSTETRICS & GYNECOLOGY

## 2020-06-19 NOTE — PROGRESS NOTES
"Gay Cummins is a 35 year old female who is being evaluated via a billable telephone visit.      The patient has been notified of following:     \"This telephone visit will be conducted via a call between you and your physician/provider. We have found that certain health care needs can be provided without the need for a physical exam.  This service lets us provide the care you need with a short phone conversation.  If a prescription is necessary we can send it directly to your pharmacy.  If lab work is needed we can place an order for that and you can then stop by our lab to have the test done at a later time.    Telephone visits are billed at different rates depending on your insurance coverage. During this emergency period, for some insurers they may be billed the same as an in-person visit.  Please reach out to your insurance provider with any questions.    If during the course of the call the physician/provider feels a telephone visit is not appropriate, you will not be charged for this service.\"    Patient has given verbal consent for Telephone visit?  Yes    What phone number would you like to be contacted at? [phone]    How would you like to obtain your AVS? MyChart     Postpartum follow up by phone due to COVID19.  RCS and PPTL with twin gestation and malpresenting twin on 4/29/120    Babies in NICU x 15 days.  Home and doing well.    No problems or concerns.    RTC 1 year or prn    Phone call duration: 9 minutes    Solo Coleman MD      "

## 2020-06-19 NOTE — NURSING NOTE
"Chief Complaint   Patient presents with     Post-partum Follow Up Call   Twins Nabil and Cari born via  and tubal 20  Breastfeeding and pumping   No concerns    initial Wt 81.2 kg (179 lb)   BMI 28.04 kg/m   Estimated body mass index is 28.04 kg/m  as calculated from the following:    Height as of 20: 1.702 m (5' 7\").    Weight as of this encounter: 81.2 kg (179 lb).  BP completed using cuff size NA (Not Taken)Phone visit.  Brittnee Martinez CMA    "

## 2020-08-25 PROBLEM — M54.50 LOW BACK PAIN DURING PREGNANCY: Status: RESOLVED | Noted: 2020-02-11 | Resolved: 2020-03-03

## 2020-08-25 PROBLEM — O26.899 LOW BACK PAIN DURING PREGNANCY: Status: RESOLVED | Noted: 2020-02-11 | Resolved: 2020-03-03

## 2020-08-25 NOTE — PROGRESS NOTES
Gay Cummins was seen 3 times for evaluation and treatment.  Patient did not return for further treatment and current status is unknown.  Due to short treatment duration, no objective or functional changes were made.  Please see goal flow sheet from episode noted date below and initial evaluation for further information.    Linked Episodes   Type: Episode: Status: Noted: Resolved: Last update: Updated by:   PHYSICAL THERAPY Pelvic/LBP in pregnancy Active 2/11/2020 4/6/2020 12:44 PM Olive Balbuena, PT      Comments:

## 2020-10-22 ENCOUNTER — VIRTUAL VISIT (OUTPATIENT)
Dept: PEDIATRICS | Facility: CLINIC | Age: 35
End: 2020-10-22
Payer: COMMERCIAL

## 2020-10-22 DIAGNOSIS — F33.1 MODERATE EPISODE OF RECURRENT MAJOR DEPRESSIVE DISORDER (H): Primary | ICD-10-CM

## 2020-10-22 PROCEDURE — 99214 OFFICE O/P EST MOD 30 MIN: CPT | Mod: GT | Performed by: NURSE PRACTITIONER

## 2020-10-22 PROCEDURE — 96127 BRIEF EMOTIONAL/BEHAV ASSMT: CPT | Performed by: NURSE PRACTITIONER

## 2020-10-22 RX ORDER — BUPROPION HYDROCHLORIDE 150 MG/1
TABLET ORAL
Qty: 173 TABLET | Refills: 0 | Status: SHIPPED | OUTPATIENT
Start: 2020-10-22 | End: 2021-01-21

## 2020-10-22 ASSESSMENT — ANXIETY QUESTIONNAIRES
5. BEING SO RESTLESS THAT IT IS HARD TO SIT STILL: NOT AT ALL
7. FEELING AFRAID AS IF SOMETHING AWFUL MIGHT HAPPEN: NOT AT ALL
6. BECOMING EASILY ANNOYED OR IRRITABLE: SEVERAL DAYS
GAD7 TOTAL SCORE: 8
1. FEELING NERVOUS, ANXIOUS, OR ON EDGE: SEVERAL DAYS
4. TROUBLE RELAXING: MORE THAN HALF THE DAYS
2. NOT BEING ABLE TO STOP OR CONTROL WORRYING: MORE THAN HALF THE DAYS
GAD7 TOTAL SCORE: 8
3. WORRYING TOO MUCH ABOUT DIFFERENT THINGS: MORE THAN HALF THE DAYS
GAD7 TOTAL SCORE: 8
7. FEELING AFRAID AS IF SOMETHING AWFUL MIGHT HAPPEN: NOT AT ALL

## 2020-10-22 ASSESSMENT — PATIENT HEALTH QUESTIONNAIRE - PHQ9
10. IF YOU CHECKED OFF ANY PROBLEMS, HOW DIFFICULT HAVE THESE PROBLEMS MADE IT FOR YOU TO DO YOUR WORK, TAKE CARE OF THINGS AT HOME, OR GET ALONG WITH OTHER PEOPLE: SOMEWHAT DIFFICULT
SUM OF ALL RESPONSES TO PHQ QUESTIONS 1-9: 14
SUM OF ALL RESPONSES TO PHQ QUESTIONS 1-9: 14

## 2020-10-22 NOTE — PROGRESS NOTES
"Gay Cummins is a 35 year old female who is being evaluated via a billable video visit.      The patient has been notified of following:     \"This video visit will be conducted via a call between you and your physician/provider. We have found that certain health care needs can be provided without the need for an in-person physical exam.  This service lets us provide the care you need with a video conversation.  If a prescription is necessary we can send it directly to your pharmacy.  If lab work is needed we can place an order for that and you can then stop by our lab to have the test done at a later time.    Video visits are billed at different rates depending on your insurance coverage.  Please reach out to your insurance provider with any questions.    If during the course of the call the physician/provider feels a video visit is not appropriate, you will not be charged for this service.\"    Patient has given verbal consent for Video visit? Yes  How would you like to obtain your AVS? MyChart  If you are dropped from the video visit, the video invite should be resent to: Text to cell phone: [phone]  Will anyone else be joining your video visit? No    Subjective     Gay Cummins is a 35 year old female who presents today via video visit for the following health issues:    History of Present Illness       Mental Health Follow-up:  Patient presents to follow-up on Depression & Anxiety.Patient's depression since last visit has been:  Worse  The patient is not having other symptoms associated with depression.  Patient's anxiety since last visit has been:  Worse  The patient is not having other symptoms associated with anxiety.  Any significant life events: other  Patient is feeling anxious or having panic attacks.  Patient has no concerns about alcohol or drug use.     Social History  Tobacco Use    Smoking status: Never Smoker    Smokeless tobacco: Never Used  Alcohol use: Not Currently    " Alcohol/week: 0.0 standard drinks    Frequency: Never    Drinks per session: Patient refused    Binge frequency: Never  Drug use: No      Today's PHQ-9         PHQ-9 Total Score:     (P) 14   PHQ-9 Q9 Thoughts of better off dead/self-harm past 2 weeks :   (P) Not at all   Thoughts of suicide or self harm:      Self-harm Plan:        Self-harm Action:          Safety concerns for self or others:                 Social History     Tobacco Use     Smoking status: Never Smoker     Smokeless tobacco: Never Used   Substance Use Topics     Alcohol use: Not Currently     Alcohol/week: 0.0 standard drinks     Frequency: Never     Drinks per session: Patient refused     Binge frequency: Never     Drug use: No     PHQ 11/22/2019 1/31/2020 10/22/2020   PHQ-9 Total Score 2 13 14   Q9: Thoughts of better off dead/self-harm past 2 weeks Not at all Not at all Not at all     SANDEEP-7 SCORE 3/23/2020 3/24/2020 10/22/2020   Total Score 5 (mild anxiety) 5 (mild anxiety) 8 (mild anxiety)   Total Score 5 5 8     Last PHQ-9 10/22/2020   1.  Little interest or pleasure in doing things 1   2.  Feeling down, depressed, or hopeless 2   3.  Trouble falling or staying asleep, or sleeping too much 2   4.  Feeling tired or having little energy 3   5.  Poor appetite or overeating 3   6.  Feeling bad about yourself 2   7.  Trouble concentrating 1   8.  Moving slowly or restless 0   Q9: Thoughts of better off dead/self-harm past 2 weeks 0   PHQ-9 Total Score 14   Difficulty at work, home, or with people -     SANDEEP-7  10/22/2020   1. Feeling nervous, anxious, or on edge 1   2. Not being able to stop or control worrying 2   3. Worrying too much about different things 2   4. Trouble relaxing 2   5. Being so restless that it is hard to sit still 0   6. Becoming easily annoyed or irritable 1   7. Feeling afraid, as if something awful might happen 0   SANDEEP-7 Total Score 8   If you checked any problems, how difficult have they made it for you to do your work,  take care of things at home, or get along with other people? -       Suicide Assessment Five-step Evaluation and Treatment (SAFE-T)             Video Start Time: 2:40 PM    Review of Systems   Constitutional, HEENT, cardiovascular, pulmonary, GI, , musculoskeletal, neuro, skin, endocrine and psych systems are negative, except as otherwise noted.      Objective           Vitals:  No vitals were obtained today due to virtual visit.    Physical Exam     GENERAL: Healthy, alert and no distress  EYES: Eyes grossly normal to inspection.  No discharge or erythema, or obvious scleral/conjunctival abnormalities.  RESP: No audible wheeze, cough, or visible cyanosis.  No visible retractions or increased work of breathing.    SKIN: Visible skin clear. No significant rash, abnormal pigmentation or lesions.  NEURO: Cranial nerves grossly intact.  Mentation and speech appropriate for age.  PSYCH: Mentation appears normal, affect normal/bright, judgement and insight intact, normal speech and appearance well-groomed.      No results found for any visits on 10/22/20.        Assessment & Plan     Moderate episode of recurrent major depressive disorder (H)  With anxiety  Patient with lifelong depression and anxiety. Worsened postpartum with the birth of her 4y.o. but improved with citalopram.  At some point, she was switched to Sertraline, which didn't seem to help her as much. Towards the end of pregnancy she decided to switch back to citalopram and increased the dose to 40mg in anticipation of worsening mood sx postpartum. She felt better after the switch, but did worsen again postpartum. For the last several weeks has been progressively worsening, feeling overwhelmed in general and hopeless. No SI/HI. Has good social support, kids are full time  and she works part time, so she does have some time to sleep and catch up on household chores. However, she does not get good, uninterrupted sleep due to 4 y.o. sleep regression and  twins. Plans to stop nursing next week as 6 months was her goal, and she finds it to be very stressful.   - buPROPion (WELLBUTRIN XL) 150 MG 24 hr tablet; Take 1 tablet (150 mg) by mouth every morning for 7 days, THEN 2 tablets (300 mg) every morning.  - MENTAL HEALTH REFERRAL  - Adult; Psychiatry; Psychiatry; G: Collaborative Care Psychiatry Service/Bridge to Long-Term Psychiatry as indicated ([phone]); Yes (Worsening postpartum depression and anxiety); Several Medications tried and fail...   -Continue Citalopram 40mg for now, as this had been effective in the past  -Add Wellbutrin-titrate up to 300mg. She is to wait to start this until she is done nursing. Reviewed r/b/se  -Consider DHA supplementation, vitamin D, and re-starting a prenatal with iron to ensure nutrient deficiency is not contributing  -Continue with current therapist, consider switching to a PP mood specialist in the future  -Asked her to call on social support, increasing the amount of time her mom, etc helps with the kids, does laundry, cooks, cleans, etc for her for now to give her a break  -Attempt to incorporate exercise  -I think stopping nursing may decrease stress and improve ability to sleep  -I did refer her to psych and asked her to schedule for 6 weeks form now. She will most likely be able to cancel but I wanted that option for her if she needed it, as I don't think simply switching to another selective serotonin reuptake inhibitor/SNRI at this point will be enough if she doesn't improve on the Wellbutrin. Could consider switching to Remeron, but would prefer psych input.   -Crisis resources discussed  -She will send me a mychart update in 4 weeks, sooner if decompensating    TED Ji CNP  [facility]      Video-Visit Details    Type of service:  Video Visit    Video End Time:3pm    Originating Location (pt. Location): Home    Distant Location (provider location):  [facility]  [employer]     Platform used for Video Visit: [employer]        Answers for HPI/ROS submitted by the patient on 10/22/2020   Chronic problems general questions HPI Form  If you checked off any problems, how difficult have these problems made it for you to do your work, take care of things at home, or get along with other people?: Somewhat difficult  PHQ9 TOTAL SCORE: 14  SANDEEP 7 TOTAL SCORE: 8

## 2020-10-23 ASSESSMENT — PATIENT HEALTH QUESTIONNAIRE - PHQ9: SUM OF ALL RESPONSES TO PHQ QUESTIONS 1-9: 14

## 2020-10-23 ASSESSMENT — ANXIETY QUESTIONNAIRES: GAD7 TOTAL SCORE: 8

## 2020-11-06 NOTE — PROGRESS NOTES
Results will be discussed directly with patient in clinic at her next visit.     Kayla Carlton MD Home

## 2020-12-30 ENCOUNTER — MYC MEDICAL ADVICE (OUTPATIENT)
Dept: OBGYN | Facility: CLINIC | Age: 35
End: 2020-12-30

## 2021-03-11 ENCOUNTER — VIRTUAL VISIT (OUTPATIENT)
Dept: PSYCHIATRY | Facility: CLINIC | Age: 36
End: 2021-03-11
Attending: NURSE PRACTITIONER
Payer: COMMERCIAL

## 2021-03-11 DIAGNOSIS — F41.1 GAD (GENERALIZED ANXIETY DISORDER): ICD-10-CM

## 2021-03-11 DIAGNOSIS — F33.1 MODERATE EPISODE OF RECURRENT MAJOR DEPRESSIVE DISORDER (H): Primary | ICD-10-CM

## 2021-03-11 PROCEDURE — 99204 OFFICE O/P NEW MOD 45 MIN: CPT | Mod: 95 | Performed by: NURSE PRACTITIONER

## 2021-03-11 RX ORDER — LORAZEPAM 0.5 MG/1
0.5 TABLET ORAL EVERY 6 HOURS PRN
COMMUNITY
End: 2021-06-24

## 2021-03-11 ASSESSMENT — ANXIETY QUESTIONNAIRES
IF YOU CHECKED OFF ANY PROBLEMS ON THIS QUESTIONNAIRE, HOW DIFFICULT HAVE THESE PROBLEMS MADE IT FOR YOU TO DO YOUR WORK, TAKE CARE OF THINGS AT HOME, OR GET ALONG WITH OTHER PEOPLE: VERY DIFFICULT
3. WORRYING TOO MUCH ABOUT DIFFERENT THINGS: NEARLY EVERY DAY
7. FEELING AFRAID AS IF SOMETHING AWFUL MIGHT HAPPEN: NOT AT ALL
1. FEELING NERVOUS, ANXIOUS, OR ON EDGE: NEARLY EVERY DAY
6. BECOMING EASILY ANNOYED OR IRRITABLE: NEARLY EVERY DAY
5. BEING SO RESTLESS THAT IT IS HARD TO SIT STILL: SEVERAL DAYS
GAD7 TOTAL SCORE: 16
2. NOT BEING ABLE TO STOP OR CONTROL WORRYING: NEARLY EVERY DAY

## 2021-03-11 ASSESSMENT — PATIENT HEALTH QUESTIONNAIRE - PHQ9
SUM OF ALL RESPONSES TO PHQ QUESTIONS 1-9: 11
5. POOR APPETITE OR OVEREATING: NEARLY EVERY DAY

## 2021-03-11 NOTE — PROGRESS NOTES
"Gay is a 35 year old who is being evaluated via a billable video visit.      How would you like to obtain your AVS? MyChart  If the video visit is dropped, the invitation should be resent by: Text to cell phone: [phone]  Will anyone else be joining your video visit? No                                                                Outpatient Psychiatric Evaluation - Standard Adult    Name:  Gay Cummins  : 1985    Source of Referral:  Primary Care Provider: TED Grace CNP   Last visit: 2021  Current Psychotherapist: [facility]   Last visit: monthly    Identifying Data:  Patient is a 35 year old,   White American female  who presents for initial visit with me.  Patient is currently employed part time. Patient attended the session alone. Consent to communicate signed for no one. Consent for treatment signed and included in electronic medical record. Discussed limits of confidentiality today. My Practice Policy was reviewed and signed.     Patient prefers to be called: Gay     Chief Complaint:    Chief Complaint   Patient presents with     Consult     doing ok no side effects          HPI:      Gay is a 35-year-old female visiting with me today to talk about medication options to manage her symptoms of anxiety and poor focus and concentration in addition to depression.  She informed me that she gets emotional and overwhelmed.  When this happens she feels paralyzed by everything around her because there is \"so much to do\".  It is difficult for her to start projects and once she does start things she gets distracted.  When she tries to clean the house she never finishes projects.  Gay tells me she has had depression since high school.  Lately she wants to cry \"all the time\".  She feels helpless and frustrated as it is hard for her to remember things.  Work life has been difficult for her as she does not feel on \"top of things\".  " In April she gave birth to twins.  Due to the complicated pregnancy she was on disability leave for 7 months during her pregnancy.  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  She tells me she lacks confidence in doing her work as she had just finished orientation before going on disability leave.  While she is tired she is able to fall asleep but wakes up often in the middle of the night with things on her mind.    Gay tells me she has had anxiety also since high school.  After giving birth to her first child who is now 3 years of age she developed postpartum symptoms and started Celexa and counseling.  With the anxiety she often has a heavy chest feeling.  At one point she went to the emergency room as she could not catch her breath.  She assures me she takes her as needed Ativan sparingly.    Psychiatric Review of Symptoms:  Depression: Interest: Decrease  Depressed Mood  Sleep: Decrease   Energy: Decrease  Concentration: Decrease  Psychomotor slowing   Suicide: No  Helpless: Increase   Worthless: Increase   Ruminations: Increase    PHQ-9 scores:   PHQ-9 SCORE 10/22/2020 1/22/2021 3/11/2021   PHQ-9 Total Score MyChart 14 (Moderate depression) 8 (Mild depression) -   PHQ-9 Total Score 14 8 11     Jo Ann:  No symptoms   MDQ Score: Negative Screen  Anxiety: Feeling nervous, anxious, or on edge  Worrying too much about different things  Trouble relaxing    SANDEEP-7 scores:    SANDEEP-7 SCORE 10/22/2020 1/22/2021 3/11/2021   Total Score 8 (mild anxiety) 5 (mild anxiety) -   Total Score 8 5 16     Panic:  Palpitations  Shortness of Breath  Crying   Agoraphobia:  No   PTSD:  No symptoms   OCD:  Cleaning  Symmetry   Psychosis: No symptoms   ADD / ADHD: Attention Problem(s)  Task Completion Difficulties  Poor Organization  Distractible  Forgetful  Gambling or shoplifting: No   Eating Disorder:  No symptoms  Sleep:   Trouble staying asleep      Psychiatric History:   Hospitalizations:high school - anorexia and depression  History of Commitment? No   Past Treatment: counseling and inpatient mental health services  Suicide Attempts:  none  Self-injurious Behavior: Denies  Electroconvulsive Therapy (ECT) or Transcranial Magnetic Stimulation (TMS): No   Genetic Testing: No     Substance Use History:  Current use of drugs or alcohol: Alcohol    Patient reports no problems as a result of their drinking / drug use.   Based on the clinical interview, there  are not indications of drug or alcohol abuse.  Tobacco use: No  Caffeine:  Yes  2 cups/day of coffee,  sodas/day  Patient has not received chemical dependency treatment in the past  Recovery Programming Involvement: None    Past Medical History:  Past Medical History:   Diagnosis Date     Anxiety     in High school     Depression     in High school      Surgery:   Past Surgical History:   Procedure Laterality Date     ADENOIDECTOMY        SECTION N/A 2017    Procedure:  SECTION;  priamry  section ;  Surgeon: Chela Maria DO;  Location: [facility] OR      SECTION        SECTION N/A 2020    Procedure:  SECTION;  Surgeon: Solo Coleman MD;  Location: [facility] OR     CHOLECYSTECTOMY        TOOTH EXTRACTION W/FORCEP       Allergies:   No Known Allergies  Primary Care Provider: TED Grace CNP  Seizures or Head Injury: No  Diet: No Restrictions  Food Allergies: No   Exercise: No regular exercise program  Supplements: Reviewed per Electronic Medical Record Today    citalopram (CELEXA) 40 MG tablet, Take 1 tablet (40 mg) by mouth daily  LORazepam (ATIVAN) 0.5 MG tablet, Take 0.5 mg by mouth every 6 hours as needed for anxiety  Prenatal Vit-Fe Fumarate-FA (PRENATAL VITAMIN) 27-0.8 MG TABS,   acetaminophen (TYLENOL) 500 MG tablet, Take 1,000 mg by mouth every 6 hours as needed for mild pain  buPROPion (WELLBUTRIN XL) 150 MG  24 hr tablet, Take 2 tablets (300 mg) by mouth every morning (Patient not taking: Reported on 3/11/2021)  ibuprofen (ADVIL/MOTRIN) 800 MG tablet, Take 1 tablet (800 mg) by mouth every 8 hours as needed (Patient not taking: Reported on 6/19/2020)  Misc. Devices (BREAST PUMP) MISC, 1 each as needed (prn)    No current facility-administered medications on file prior to visit.        The [place] Prescription Monitoring Program has been reviewed and there are no concerns about diversionary activity for controlled substances at this time.     Vital Signs:  Vitals: There were no vitals taken for this visit.    Labs:    Most recent labs reviewed and no new labs.   No EKG on file.    Review of Systems:  10 systems (general, cardiovascular, respiratory, eyes, ENT, endocrine, GI, , M/S, neurological) were reviewed. Most pertinent finding(s) is/are: She reports no chest pain, shortness of breath with anxiety, no skin rashes, no headaches. The remaining systems are all unremarkable.  Family History:   Patient reported family history includes:   Family History   Problem Relation Age of Onset     Heart Disease Paternal Grandfather      Breast Cancer Paternal Grandmother      Diabetes Type 2  Maternal Grandfather      Lung Cancer Maternal Grandfather         smoker     Thyroid Disease Maternal Grandmother      Cerebrovascular Disease Maternal Grandmother 75     Rheumatoid Arthritis Mother 50     Family History Negative Father      Mental Illness History: Yes: depression, anxiety  Substance Abuse History: Yes: alcoholism  Suicide History: Denies  Medications: Unknown     Social History:   Born: [place], [place]  Raised: [facility]  Childhood: Easy, sheltered.  School was difficult - hard to remember things, hard to focus  Siblings:  Sister - younger  Highest education level was college graduate.   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 Feels safe at home.  Children: 3-year-old and twins born April 2020  Firearms/Weapons Access: Yes Locked up and Safety plan reviewed   Service: No    Mental Status Examination:     Appearance:  awake, alert, mild distress and casually dressed  Attitude:  cooperative   Eye Contact:  adequate  Gait and Station: No assistive Devices used and No dizziness or falls  Psychomotor Behavior:  intact station, gait and muscle tone  Oriented to:  time, person, and place  Attention Span and Concentration:  Normal  Speech:  clear, coherent and Speaks: English  Mood:  anxious and depressed  Affect:  mood congruent  Associations:  no loose associations  Thought Process:  goal oriented  Thought Content:  no evidence of suicidal ideation or homicidal ideation, no auditory hallucinations present and no visual hallucinations present  Recent and Remote Memory:  intact Not formally assessed. No amnesia.  Fund of Knowledge: appropriate  Insight:  good  Judgment:  intact  Impulse Control:  intact    Suicide Risk Assessment:  Today Gay Cummins reports that she is having no thoughts to want to end her life or to harm other people. In addition, there are notable risk factors for self-harm, including anxiety and mood change. However, risk is mitigated by commitment to family, sobriety, history of seeking help when needed, future oriented, denies suicidal intent or plan and denies homicidal ideation, intent, or plan. Therefore, based on all available evidence including the factors cited above, Gay Cummins does not appear to be at imminent risk for self-harm, does not meet criteria for a 72-hr hold, and therefore remains appropriate for ongoing outpatient level of care.  A thorough assessment of risk factors related to suicide and self-harm have been reviewed and are noted above. The patient convincingly denies acute suicidality on several occasions. Local community safety resources reviewed and printed  for patient to use if needed. There was no deceit detected, and the patient presented in a manner that was believable.     DSM5  Diagnosis:  296.32 (F33.1) Major Depressive Disorder, Recurrent Episode, Moderate _ and With mixed features  300.02 (F41.1) Generalized Anxiety Disorder    Medical Comorbidities Include:   Patient Active Problem List    Diagnosis Date Noted     Delivery of pregnancy by  section 2020     Priority: Medium     Encounter for triage in pregnant patient 2020     Priority: Medium     History of  delivery 2019     Priority: Medium     Moderate episode of recurrent major depressive disorder (H) 2017     Priority: Medium     SANDEEP (generalized anxiety disorder) 2017     Priority: Medium     Sacroiliac joint pain 2017     Priority: Medium       A 12-item WHODAS 2.0 assessment was completed by the patient today and recorded in [employer].  No flowsheet data found.    The Patient Activation Measure (ISSA) score was completed and recorded in [employer]. This assesses patient knowledge, skill, and confidence for self-management.   ISSA Score (Last Two) 2019   ISSA Raw Score 39 30   Activation Score 90.2 56   ISSA Level 4 3                Impression:  Gay Cummins is visiting with me today to explore medication options to better control her mood symptoms.  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  She has been taking citalopram which will continue.  She had been ordered from her previous provider lorazepam as needed for panic.  Of note she is breast-feeding.  I asked her to consider adding buspirone for anxiety.  She felt that her symptoms of anxiety and depression required more attention as opposed to continuing breast-feeding so she is going to wean her children off  from Funidelia over the next 2 weeks.  After that time she is going to contact me for consideration of adding Abilify 2 mg daily.  She is receiving talk therapy and that we will continue.  I also mailed a GeneSight test kit to her home for consideration of future medication choices..    Medication side effects and alternatives reviewed. Health promotion activities recommended and reviewed today. All questions addressed. Education and counseling completed regarding risks and benefits of medications and psychotherapy options. Collaborative Care Psychiatry Service model reviewed today. Recommend therapy for additional support.     Treatment Plan:     1.  Continue citalopram 40 mg daily  2.  Continue lorazepam daily as needed for panic  3.  Once you are done breast-feeding, add Abilify 2 mg daily  4.  Consider adding buspirone for anxiety  5.  Continue talk therapy  6.  A GeneSight test kit will be mailed to your home        Continue all other medical directions per primary care provider.     Continue all other medications as reviewed per electronic medical record today.     Safety plan reviewed. To the Emergency Department as needed or call after hours crisis line at [phone] or [phone]. [place] Crisis Text Line: Text [place] to 705811  or  Suicide LifeLine Chat: s[email or web address]/chat/    To schedule individual or family therapy, call [facility] at [phone].     Schedule an appointment with me on March 24 or sooner as needed.  Call [facility] at [phone] to schedule.    Follow up with primary care provider as planned or for acute medical concerns.    Call the psychiatric nurse line with medication questions or concerns at [phone].    D[employer] Micropowerhart may be used to communicate with your provider, but this is not intended to be used for emergencies.    Crisis Resources:    National Suicide Prevention Lifeline: 565.626.8998 (TTY: 516.260.9281). Call anytime for  help.  (www.suicidepreventionlifeline.org)  National [place] on Mental Illness (www.constanza.org): 211.722.3092 or 572-607-3753.   Mental Health Association (www.mentalhealth.org): 378.235.9074 or 692-306-9837.  [place] Crisis Text Line: Text [place] to 773534  Suicide LifeLine Chat: suicidef4samurai.org/chat    Administrative Billing:   Time spent with patient was 60 minutes and greater than 50% of time or 40 minutes was spent in counseling and coordination of care regarding above diagnoses and treatment plan.    Patient Status:  Patient will continue to be seen for ongoing consultation and stabilization.    Signed:   HAROLDO Schmidt-BC   Psychiatry

## 2021-03-11 NOTE — PATIENT INSTRUCTIONS
1.  Continue citalopram 40 mg daily  2.  Continue lorazepam daily as needed for panic  3.  Once you are done breast-feeding, add Abilify 2 mg daily  4.  Consider adding buspirone for anxiety  5.  Continue talk therapy  6.  A [employer] test kit will be mailed to your home        Continue all other medical directions per primary care provider.     Continue all other medications as reviewed per electronic medical record today.     Safety plan reviewed. To the Emergency Department as needed or call after hours crisis line at [phone] or [phone]. [place] Crisis Text Line: Text [place] to 265787  or  Suicide LifeLine Chat: [email or web address]/chat/    To schedule individual or family therapy, call [facility] at [phone].     Schedule an appointment with me on March 24 or sooner as needed.  Call [facility] at [phone] to schedule.    Follow up with primary care provider as planned or for acute medical concerns.    Call the psychiatric nurse line with medication questions or concerns at [phone].    [employer] may be used to communicate with your provider, but this is not intended to be used for emergencies.    Crisis Resources:    National Suicide Prevention Lifeline: 937.499.5732 (TTY: 905.712.5087). Call anytime for help.  (www.suicidepreventionlifeline.org)  National [place] on Mental Illness (www.constanza.org): 423.257.7209 or 497-422-4513.   Mental Health Association (www.mentalhealth.org): 868.220.9604 or 735-242-6406.  [place] Crisis Text Line: Text [place] to 012017  Suicide LifeLine Chat: [place].[email or web address]/chat

## 2021-03-12 ASSESSMENT — ANXIETY QUESTIONNAIRES: GAD7 TOTAL SCORE: 16

## 2021-03-24 ENCOUNTER — VIRTUAL VISIT (OUTPATIENT)
Dept: PSYCHIATRY | Facility: CLINIC | Age: 36
End: 2021-03-24
Payer: COMMERCIAL

## 2021-03-24 DIAGNOSIS — F33.1 MODERATE EPISODE OF RECURRENT MAJOR DEPRESSIVE DISORDER (H): Primary | ICD-10-CM

## 2021-03-24 DIAGNOSIS — F90.9 ATTENTION DEFICIT HYPERACTIVITY DISORDER (ADHD), UNSPECIFIED ADHD TYPE: ICD-10-CM

## 2021-03-24 DIAGNOSIS — F41.1 GAD (GENERALIZED ANXIETY DISORDER): ICD-10-CM

## 2021-03-24 PROCEDURE — 99214 OFFICE O/P EST MOD 30 MIN: CPT | Mod: 95 | Performed by: NURSE PRACTITIONER

## 2021-03-24 RX ORDER — ARIPIPRAZOLE 2 MG/1
2 TABLET ORAL DAILY
Qty: 30 TABLET | Refills: 1 | Status: SHIPPED | OUTPATIENT
Start: 2021-03-24 | End: 2021-06-24

## 2021-03-24 ASSESSMENT — PATIENT HEALTH QUESTIONNAIRE - PHQ9
SUM OF ALL RESPONSES TO PHQ QUESTIONS 1-9: 9
5. POOR APPETITE OR OVEREATING: MORE THAN HALF THE DAYS

## 2021-03-24 ASSESSMENT — ANXIETY QUESTIONNAIRES
7. FEELING AFRAID AS IF SOMETHING AWFUL MIGHT HAPPEN: NOT AT ALL
3. WORRYING TOO MUCH ABOUT DIFFERENT THINGS: SEVERAL DAYS
GAD7 TOTAL SCORE: 7
6. BECOMING EASILY ANNOYED OR IRRITABLE: NOT AT ALL
1. FEELING NERVOUS, ANXIOUS, OR ON EDGE: NEARLY EVERY DAY
2. NOT BEING ABLE TO STOP OR CONTROL WORRYING: SEVERAL DAYS
IF YOU CHECKED OFF ANY PROBLEMS ON THIS QUESTIONNAIRE, HOW DIFFICULT HAVE THESE PROBLEMS MADE IT FOR YOU TO DO YOUR WORK, TAKE CARE OF THINGS AT HOME, OR GET ALONG WITH OTHER PEOPLE: SOMEWHAT DIFFICULT
5. BEING SO RESTLESS THAT IT IS HARD TO SIT STILL: NOT AT ALL

## 2021-03-24 NOTE — PATIENT INSTRUCTIONS
1.  Add Abilify 2 mg daily    2.  Continue citalopram 40 mg daily    3.  Continue lorazepam as needed but take sparingly    4.  Referral made for psychological testing-diagnosis clarification and rule out ADHD    5.  GeneSight test when able to      Continue all other medications as reviewed per electronic medical record today.     Safety plan reviewed. To the Emergency Department as needed or call after hours crisis line at [phone] or [phone]. [place] Crisis Text Line. Text [place] to 698363 or Suicide LifeLine Chat: [place] [email or web address]/chat/    To schedule individual or family therapy, call [facility] at [phone].    Schedule an appointment with me in April or sooner as needed. Call [facility] at [phone] to schedule.    Follow up with primary care provider as planned or for acute medical concerns.    Call the psychiatric nurse line with medication questions or concerns at [phone].    [employer]i Gamehart may be used to communicate with your provider, but this is not intended to be used for emergencies.    Crisis Resources:    National Suicide Prevention Lifeline: 189.353.1996 (TTY: 171.358.3098). Call anytime for help.  (www.suicidepreventionlifeline.org)  National [place] on Mental Illness (www.constanza.org): 391.937.7835 or 434-005-4987.   Mental Health Association (www.mentalhealth.org): 362.144.3698 or 597-832-7166.  [place] Crisis Text Line: Text [place] to 521436  Suicide LifeLine Chat: [place] [email or web address]/chat

## 2021-03-24 NOTE — PROGRESS NOTES
"Gay is a 35 year old who is being evaluated via a billable video visit.      How would you like to obtain your AVS? MyChart  If the video visit is dropped, the invitation should be resent by: Text to cell phone: [phone]  Will anyone else be joining your video visit? No            Outpatient Psychiatric Progress Note    Name: Gay Cummins   : 1985                    Primary Care Provider: TED Grace CNP   Therapist: None    PHQ-9 scores:  PHQ-9 SCORE 2021 3/11/2021 3/24/2021   PHQ-9 Total Score MyChart 8 (Mild depression) - -   PHQ-9 Total Score 8 11 9       SANDEEP-7 scores:  SANDEEP-7 SCORE 2021 3/11/2021 3/24/2021   Total Score 5 (mild anxiety) - -   Total Score 5 16 7       Patient Identification:    Patient is a 35 year old year old,   White American female  who presents for return visit with me.  Patient is currently employed part time. Patient attended the session alone. Patient prefers to be called: \" Gay\".    Interim History:    I last saw Gay Cummins for outpatient psychiatry Consultation on 2021.     During that appointment, we met for the first time to explore medication options to better control her mood symptoms.  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  She has been taking citalopram which will continue.  She had been ordered from her previous provider lorazepam as needed for panic.  Of note she is breast-feeding.  I asked her to consider adding buspirone for anxiety.  She felt that her symptoms of anxiety and depression required more attention as opposed to continuing breast-feeding so she is going to wean her children off from breastmilk over the next 2 weeks.  After that time she is going to contact me for consideration of adding Abilify 2 mg daily.  She " is receiving talk therapy and that we will continue.  I also mailed a [employer] test kit to her home for consideration of future medication choices.. .     Current medications include: citalopram (CELEXA) 40 MG tablet, Take 1 tablet (40 mg) by mouth daily  LORazepam (ATIVAN) 0.5 MG tablet, Take 0.5 mg by mouth every 6 hours as needed for anxiety  Prenatal Vit-Fe Fumarate-FA (PRENATAL VITAMIN) 27-0.8 MG TABS,   Misc. Devices (BREAST PUMP) MISC, 1 each as needed (prn)    No current facility-administered medications on file prior to visit.        The [place] Prescription Monitoring Program has been reviewed and there are no concerns about diversionary activity for controlled substances at this time.      I was able to review most recent Primary Care Provider, specialty provider, and therapy visit notes that I have access to.     Today, patient reports she has stopped breastfeeding.  She has not started Abilify. No SI.  At work she is somewhat overwhelmed in the surgical ICU.  She is having a hard time relaxing when she looks at projects that need to be done.  Gay tells me she has a hard time staying focused on task completions and then gets overwhelmed with too many projects to do around the house      has a past medical history of Anxiety and Depression. She also has no past medical history of Chronic infection, History of blood transfusion, Malignant hyperthermia, or Sleep apnea.    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  She cares for her son and 2 twin children.    Substance use updates:    She denies alcohol use  Tobacco use: No    Vital Signs:   There were no vitals taken for this visit.    Labs:    Most recent laboratory results reviewed and no new labs.     Review of Systems:  10 systems (general, cardiovascular, respiratory, eyes, ENT, endocrine, GI, , M/S, neurological) were reviewed. Most pertinent finding(s) is/are: She reports no chest pain, no shortness of  breath, no skin rashes. The remaining systems are all unremarkable.    Mental Status Examination:  Appearance:  awake, alert, mild distress and casually dressed  Attitude:  cooperative   Eye Contact:  adequate  Gait and Station: No assistive Devices used and No dizziness or falls  Psychomotor Behavior:  intact station, gait and muscle tone  Oriented to:  time, person, and place  Attention Span and Concentration:  Fair  Speech:   clear, coherent and Speaks: English  Mood:  anxious and depressed  Affect:  mood congruent  Associations:  no loose associations  Thought Process:  goal oriented  Thought Content:  no evidence of suicidal ideation or homicidal ideation, no auditory hallucinations present and no visual hallucinations present  Recent and Remote Memory:  intact Not formally assessed. No amnesia.  Fund of Knowledge: appropriate  Insight:  good  Judgment:  intact  Impulse Control:  intact    Suicide Risk Assessment:  Today Gay Cummins reports that she is having no thoughts to want to end her life or to harm other people. In addition, there are notable risk factors for self-harm, including anxiety and mood change. However, risk is mitigated by commitment to family, sobriety, history of seeking help when needed, future oriented, denies suicidal intent or plan and denies homicidal ideation, intent, or plan. Therefore, based on all available evidence including the factors cited above, Gay Cummins does not appear to be at imminent risk for self-harm, does not meet criteria for a 72-hr hold, and therefore remains appropriate for ongoing outpatient level of care.  A thorough assessment of risk factors related to suicide and self-harm have been reviewed and are noted above. The patient convincingly denies suicidality on several occasions. Local community safety resources printed and reviewed for patient to use if needed. There was no deceit detected, and the patient presented in a manner that was believable.      DSM5 Diagnosis:  Attention-Deficit/Hyperactivity Disorder  314.01 (F90.9) Unspecified Attention -Deficit / Hyperactivity Disorder  296.32 (F33.1) Major Depressive Disorder, Recurrent Episode, Moderate _ and With melancholic features  300.02 (F41.1) Generalized Anxiety Disorder    Medical comorbidities include:   Patient Active Problem List    Diagnosis Date Noted     Delivery of pregnancy by  section 2020     Priority: Medium     Encounter for triage in pregnant patient 2020     Priority: Medium     History of  delivery 2019     Priority: Medium     Moderate episode of recurrent major depressive disorder (H) 2017     Priority: Medium     SANDEEP (generalized anxiety disorder) 2017     Priority: Medium     Sacroiliac joint pain 2017     Priority: Medium       Assessment:    Gay NARESH Cummins informed me that she had stopped breast-feeding.  While it was a difficult decision for her to make she voiced concern about continued depression symptoms.  I added Abilify 2 mg daily to her citalopram at 40 mg daily.  She will continue the lorazepam as needed but encouraged to take it sparingly.  She had not gotten around to conducting the GeneSight test but it is available in the future if she would like to do so.  Finally based upon some of her concerns for attention, focus, and organization, she requested psychological testing to rule out a diagnosis of ADHD.    Medication side effects and alternatives were reviewed. Health promotion activities recommended and reviewed today. All questions addressed. Education and counseling completed regarding risks and benefits of medications and psychotherapy options.    Treatment Plan:        1.  Add Abilify 2 mg daily    2.  Continue citalopram 40 mg daily    3.  Continue lorazepam as needed but take sparingly    4.  Referral made for psychological testing-diagnosis clarification and rule out ADHD    5.  GeneSight test when able  to      Continue all other medications as reviewed per electronic medical record today.     Safety plan reviewed. To the Emergency Department as needed or call after hours crisis line at [phone] or [phone]. [place] Crisis Text [place]. Text [place] to 644552 or Suicide LifeLine Chat: suicideRaptr.org/chat/    To schedule individual or family therapy, call [facility] at [phone].    Schedule an appointment with me in April or sooner as needed. Call [facility] at [phone] to schedule.    Follow up with primary care provider as planned or for acute medical concerns.    Call the psychiatric nurse line with medication questions or concerns at [phone].    [employer] may be used to communicate with your provider, but this is not intended to be used for emergencies.    Crisis Resources:    National Suicide Prevention Lifeline: 802.672.7649 (TTY: 114.277.9235). Call anytime for help.  (www.suicidepreventionlifeline.org)  National [place] on Mental Illness (www.constanza.org): 939-328-0967 or 336-858-4320.   Mental Health Association (www.mentalhealth.org): 968.909.7603 or 321-828-4751.  [place] Crisis [place]: Text [place] to 323717  Suicide LifeLine Chat: suicideRaptr.org/chat    Administrative Billing:   Time spent with patient was 30 minutes and greater than 50% of time or 20 minutes was spent in counseling and coordination of care regarding above diagnoses and treatment plan.    Patient Status:  Patient will continue to be seen for ongoing consultation and stabilization.    Signed:   HAROLDO Schmidt-BC   Psychiatry

## 2021-03-25 ASSESSMENT — ANXIETY QUESTIONNAIRES: GAD7 TOTAL SCORE: 7

## 2021-04-13 NOTE — PLAN OF CARE
Impression: Other long term (current) drug therapy Plan: Plaquenil use without macular toxicity, ok to cont med. No ring of depigmented retinal pigment epithelium OU Hydroxychloroquine confirmed dose 200mg daily. NO medication induced retinal damage. YEARLY exam.  
Unable to perform HVF 10-2 * Get HVF 10-2 and SD OCT annually as recommended by  Plaquenil Retinal Surveillance Protocol RTC if any changes or distortions or color vision changes noted OU. Pt ROM plus negative Pt talking throgh UC's.. Pt crying, has several questions re: testing and trickle she occ feels. Questions answered and reassured, explained other possibilities to what she is experiencing. Pt now has bad HA, VSS. States she has a hx of migraines, Tylenol given po. Pt has not eaten since 0930 so crackers, pudding and juice given. Reassured again. Will repeat BP and VE in 30-60 mins.

## 2021-04-15 ENCOUNTER — TRANSFERRED RECORDS (OUTPATIENT)
Dept: HEALTH INFORMATION MANAGEMENT | Facility: CLINIC | Age: 36
End: 2021-04-15

## 2021-05-13 DIAGNOSIS — F41.1 GAD (GENERALIZED ANXIETY DISORDER): ICD-10-CM

## 2021-05-13 RX ORDER — CITALOPRAM HYDROBROMIDE 40 MG/1
TABLET ORAL
Qty: 90 TABLET | Refills: 1 | Status: SHIPPED | OUTPATIENT
Start: 2021-05-13 | End: 2021-11-18

## 2021-06-23 ENCOUNTER — MYC MEDICAL ADVICE (OUTPATIENT)
Dept: FAMILY MEDICINE | Facility: CLINIC | Age: 36
End: 2021-06-23

## 2021-06-23 ASSESSMENT — PATIENT HEALTH QUESTIONNAIRE - PHQ9
SUM OF ALL RESPONSES TO PHQ QUESTIONS 1-9: 5
SUM OF ALL RESPONSES TO PHQ QUESTIONS 1-9: 5
10. IF YOU CHECKED OFF ANY PROBLEMS, HOW DIFFICULT HAVE THESE PROBLEMS MADE IT FOR YOU TO DO YOUR WORK, TAKE CARE OF THINGS AT HOME, OR GET ALONG WITH OTHER PEOPLE: NOT DIFFICULT AT ALL

## 2021-06-23 ASSESSMENT — ANXIETY QUESTIONNAIRES
3. WORRYING TOO MUCH ABOUT DIFFERENT THINGS: SEVERAL DAYS
7. FEELING AFRAID AS IF SOMETHING AWFUL MIGHT HAPPEN: NOT AT ALL
7. FEELING AFRAID AS IF SOMETHING AWFUL MIGHT HAPPEN: NOT AT ALL
GAD7 TOTAL SCORE: 14
4. TROUBLE RELAXING: NEARLY EVERY DAY
5. BEING SO RESTLESS THAT IT IS HARD TO SIT STILL: NEARLY EVERY DAY
GAD7 TOTAL SCORE: 14
1. FEELING NERVOUS, ANXIOUS, OR ON EDGE: NEARLY EVERY DAY
2. NOT BEING ABLE TO STOP OR CONTROL WORRYING: SEVERAL DAYS
6. BECOMING EASILY ANNOYED OR IRRITABLE: NEARLY EVERY DAY

## 2021-06-24 ENCOUNTER — VIRTUAL VISIT (OUTPATIENT)
Dept: PSYCHIATRY | Facility: CLINIC | Age: 36
End: 2021-06-24
Payer: COMMERCIAL

## 2021-06-24 DIAGNOSIS — F41.1 GAD (GENERALIZED ANXIETY DISORDER): Primary | ICD-10-CM

## 2021-06-24 DIAGNOSIS — F33.1 MODERATE EPISODE OF RECURRENT MAJOR DEPRESSIVE DISORDER (H): ICD-10-CM

## 2021-06-24 PROCEDURE — 99214 OFFICE O/P EST MOD 30 MIN: CPT | Mod: 95 | Performed by: NURSE PRACTITIONER

## 2021-06-24 RX ORDER — QUETIAPINE FUMARATE 25 MG/1
25 TABLET, FILM COATED ORAL AT BEDTIME
Qty: 30 TABLET | Refills: 1 | Status: SHIPPED | OUTPATIENT
Start: 2021-06-24 | End: 2021-07-29

## 2021-06-24 RX ORDER — LORAZEPAM 0.5 MG/1
0.5 TABLET ORAL EVERY 6 HOURS PRN
Qty: 10 TABLET | Refills: 0 | Status: SHIPPED | OUTPATIENT
Start: 2021-06-24

## 2021-06-24 ASSESSMENT — ANXIETY QUESTIONNAIRES: GAD7 TOTAL SCORE: 14

## 2021-06-24 NOTE — PATIENT INSTRUCTIONS
1.  GeneSight test results reviewed-a copy will be mailed to your home    2.  Medication therapy management referral to review GeneSight test results in depth    3.  Quetiapine 25 mg at bedtime to decrease irritability, anxiety, and to help you sleep better    4.  Continue citalopram 40 mg daily    5.  Consider adding desvenlafaxine while tapering off of citalopram in the future to better manage anxiety symptoms      Continue all other medications as reviewed per electronic medical record today.     Safety plan reviewed. To the Emergency Department as needed or call after hours crisis line at [phone] or [phone]. [place] Crisis Text [place]. Text [place] to 477999 or Suicide LifeLine Chat: FriendsEAT.org/chat/    To schedule individual or family therapy, call [facility] at [phone].    Schedule an appointment with me in August or sooner as needed. Call [facility] at [phone] to schedule.    Follow up with primary care provider as planned or for acute medical concerns.    Call the psychiatric nurse line with medication questions or concerns at [phone].    [employer] may be used to communicate with your provider, but this is not intended to be used for emergencies.    Crisis Resources:    National Suicide Prevention Lifeline: 200.407.9902 (TTY: 931.696.7402). Call anytime for help.  (www.suicidepreventionlifeline.org)  National [place] on Mental Illness (www.constanza.org): 298.472.2281 or 799-643-4593.   Mental Health Association (www.mentalhealth.org): 413.547.5314 or 024-537-7432.  [place] Crisis [place]: Text [place] to 770943  Suicide LifeLine Chat: FriendsEAT.org/chat

## 2021-06-24 NOTE — PROGRESS NOTES
"        Gay is a 36 year old who is being evaluated via a billable video visit.      How would you like to obtain your AVS? MyChart  If the video visit is dropped, the invitation should be resent by: Send to e-mail at: [email or web address]  Will anyone else be joining your video visit? No      Video Start Time: 915 AM    Location of patient:  Home   Any new OTC medications: No  Recent height or weight: No  Recent BP/HR: No  Alcohol use:  Yes If yes, how many drinks per week: 2 glasses of wine a night   Current other substance use (including Vaping):  Denies  Any updates with mental health (hospital stay, ER, etc) that Luciana should know about: No  Taking medications every day: Yes  If female: Birth control: No  What is the most important thing you would like addressed today: continued anxiety and feeling of overwhelmed and unable to focus.                 Video-Visit Details    Type of service:  Video Visit    Video End Time:9:45 AM    Originating Location (pt. Location): Home    Distant Location (provider location):  [facility]     Platform used for Video Visit: [facility]    Outpatient Psychiatric Progress Note    Name: Gay Cummins   : 1985                    Primary Care Provider: TED Grace CNP   Therapist: yes     PHQ-9 scores:  PHQ-9 SCORE 3/11/2021 3/24/2021 2021   PHQ-9 Total Score MyChart - - 5 (Mild depression)   PHQ-9 Total Score 11 9 5       SANDEEP-7 scores:  SANDEEP-7 SCORE 3/11/2021 3/24/2021 2021   Total Score - - 14 (moderate anxiety)   Total Score 16 7 14       Patient Identification:    Patient is a 36 year old year old,   White American female  who presents for return visit with me.  Patient is currently employed part time. Patient attended the session alone. Patient prefers to be called: \"Gay\".    Interim History:    I last saw Gay Cummins for outpatient psychiatry Return Visit on " 2021.     During that appointment, she informed me that she had stopped breast-feeding.  While it was a difficult decision for her to make she voiced concern about continued depression symptoms.  I added Abilify 2 mg daily to her citalopram at 40 mg daily.  She will continue the lorazepam as needed but encouraged to take it sparingly.  She had not gotten around to conducting the GeneSight test but it is available in the future if she would like to do so.  Finally based upon some of her concerns for attention, focus, and organization, she requested psychological testing to rule out a diagnosis of ADHD .     Current medications include: ARIPiprazole (ABILIFY) 2 MG tablet, Take 1 tablet (2 mg) by mouth daily  citalopram (CELEXA) 40 MG tablet, TAKE 1 TABLET BY MOUTH EVERY DAY  LORazepam (ATIVAN) 0.5 MG tablet, Take 0.5 mg by mouth every 6 hours as needed for anxiety  Misc. Devices (BREAST PUMP) MISC, 1 each as needed (prn)  Prenatal Vit-Fe Fumarate-FA (PRENATAL VITAMIN) 27-0.8 MG TABS,     No current facility-administered medications on file prior to visit.        The [place] Prescription Monitoring Program has been reviewed and there are no concerns about diversionary activity for controlled substances at this time.      I was able to review most recent Primary Care Provider, specialty provider, and therapy visit notes that I have access to.     Today, patient reports that when she took the Abilify she got headaches.  She has been getting more angry.  She gets chest tightness and guilt feelings.  She is easily affected when her daughter does not listen.  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  It is less stressful going in there.  She feels like she cannot control things at home.  She sees her therapist to reset her mindset.  GeneSight test results reviewed.  It is difficult for her to sustain a restful sleep.  Gay tells me she feels support from family members.     has a past medical history of Anxiety and Depression.  She also has no past medical history of Chronic infection, History of blood transfusion, Malignant hyperthermia, or Sleep apnea.    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  Mounting medical bills for her family create financial stress for her.    Substance use updates:    Occasional glass of wine  Tobacco use: No    Vital Signs:   There were no vitals taken for this visit.    Labs:    Most recent laboratory results reviewed and no new labs.     Review of Systems:  10 systems (general, cardiovascular, respiratory, eyes, ENT, endocrine, GI, , M/S, neurological) were reviewed. Most pertinent finding(s) is/are: Some chest pain with anxiety, no shortness of breath, no skin rashes. The remaining systems are all unremarkable.    Mental Status Examination:  Appearance:  awake, alert, mild distress and casually dressed  Attitude:  cooperative   Eye Contact:  adequate  Gait and Station: No assistive Devices used and No dizziness or falls  Psychomotor Behavior:  intact station, gait and muscle tone  Oriented to:  time, person, and place  Attention Span and Concentration:  Normal  Speech:   clear, coherent  Mood:  anxious and depressed  Affect:  mood congruent  Associations:  no loose associations  Thought Process:  goal oriented  Thought Content:  no evidence of suicidal ideation or homicidal ideation, no auditory hallucinations present and no visual hallucinations present  Recent and Remote Memory:  intact Not formally assessed. No amnesia.  Fund of Knowledge: appropriate  Insight:  good  Judgment:  intact  Impulse Control:  intact    Suicide Risk Assessment:  Today Gay Cummins reports that she is having no thoughts to want to end her life or to harm other people. In addition, there are notable risk factors for self-harm, including anxiety, anger/rage and mood change. However, risk is mitigated by commitment to family, history of seeking help when needed, future  oriented, denies suicidal intent or plan and denies homicidal ideation, intent, or plan. Therefore, based on all available evidence including the factors cited above, Gay Cummins does not appear to be at imminent risk for self-harm, does not meet criteria for a 72-hr hold, and therefore remains appropriate for ongoing outpatient level of care.  A thorough assessment of risk factors related to suicide and self-harm have been reviewed and are noted above. The patient convincingly denies suicidality on several occasions. Local community safety resources printed and reviewed for patient to use if needed. There was no deceit detected, and the patient presented in a manner that was believable.     DSM5 Diagnosis:  296.31 (F33.0) Major Depressive Disorder, Recurrent Episode, Mild _ and With melancholic features  300.02 (F41.1) Generalized Anxiety Disorder  780.52 (G47.00) Insomnia Disorder   With non-sleep disorder mental comorbidity  Recurrent      Medical comorbidities include:   Patient Active Problem List    Diagnosis Date Noted     Delivery of pregnancy by  section 2020     Priority: Medium     Encounter for triage in pregnant patient 2020     Priority: Medium     History of  delivery 2019     Priority: Medium     Moderate episode of recurrent major depressive disorder (H) 2017     Priority: Medium     SANDEEP (generalized anxiety disorder) 2017     Priority: Medium     Sacroiliac joint pain 2017     Priority: Medium       Assessment:    Gay Cummins reported today ongoing irritability that has increased in intensity.  She has been processing ways to cope with this with her therapist.  Today we reviewed her [employer]branch test results.  She agreed to a trial of quetiapine 25 mg at bedtime to help her sleep better, decrease mood dysregulation and decrease anxiety symptoms.  The thought is to introduce desvenlafaxine in the future while tapering off of the citalopram for  her anxiety symptoms.  Multiple family stressors contribute to her mood exacerbation.  This includes financial stressors and managing a young household..    Medication side effects and alternatives were reviewed. Health promotion activities recommended and reviewed today. All questions addressed. Education and counseling completed regarding risks and benefits of medications and psychotherapy options.    Treatment Plan:        1.  GeneSight test results reviewed-a copy will be mailed to your home    2.  Medication therapy management referral to review GeneSight test results in depth    3.  Quetiapine 25 mg at bedtime to decrease irritability, anxiety, and to help you sleep better    4.  Continue citalopram 40 mg daily    5.  Consider adding desvenlafaxine while tapering off of citalopram in the future to better manage anxiety symptoms      Continue all other medications as reviewed per electronic medical record today.     Safety plan reviewed. To the Emergency Department as needed or call after hours crisis line at [phone] or [phone]. [place] Crisis Text Line. Text [place] to 725926 or Suicide LifeLine Chat: suicidepreventionConexus-ITline.org/chat/    To schedule individual or family therapy, call [facility] at [phone].    Schedule an appointment with me in August or sooner as needed. Call [facility] at [phone] to schedule.    Follow up with primary care provider as planned or for acute medical concerns.    Call the psychiatric nurse line with medication questions or concerns at [phone].    [employer] may be used to communicate with your provider, but this is not intended to be used for emergencies.    Crisis Resources:    National Suicide Prevention Lifeline: 524.345.2023 (TTY: 233.540.9744). Call anytime for help.  (www.suicidepreventionlifeline.org)  National [place] on Mental Illness (www.constanza.org): 917.558.4382 or 171-653-9147.   Mental Health Association  (www.mentalhealth.org): 444.126.9102 or 194-131-6624.  [place] Crisis Text Line: Text [place] to 596696  Suicide LifeLine Chat: suicidepreventionGetourguideline.org/chat    Administrative Billing:   Time spent with patient was 30 minutes and greater than 50% of time or 20 minutes was spent in counseling and coordination of care regarding above diagnoses and treatment plan.    Patient Status:  Patient will continue to be seen for ongoing consultation and stabilization.    Signed:   HAROLDO Schmidt-BC   Psychiatry

## 2021-07-09 ENCOUNTER — TELEPHONE (OUTPATIENT)
Dept: PHARMACY | Facility: CLINIC | Age: 36
End: 2021-07-09

## 2021-07-09 NOTE — TELEPHONE ENCOUNTER
Pt scheduled for MTM consult telephone appointment today to review [employer] results.  Pt did not answer x2 and VM was full so unable to leave message. Will send [employer] message to patient.     Debora Rawls, PharmD, BCPP  Medication Therapy Management Pharmacist  [facility]

## 2021-07-12 ENCOUNTER — E-VISIT (OUTPATIENT)
Dept: PEDIATRICS | Facility: CLINIC | Age: 36
End: 2021-07-12
Payer: COMMERCIAL

## 2021-07-12 DIAGNOSIS — L70.9 ACNE, UNSPECIFIED ACNE TYPE: Primary | ICD-10-CM

## 2021-07-12 PROCEDURE — 99421 OL DIG E/M SVC 5-10 MIN: CPT | Performed by: NURSE PRACTITIONER

## 2021-07-13 RX ORDER — ADAPALENE 45 G/G
GEL TOPICAL AT BEDTIME
Qty: 15 G | Status: SHIPPED | OUTPATIENT
Start: 2021-07-13

## 2021-07-14 ENCOUNTER — TELEPHONE (OUTPATIENT)
Dept: PEDIATRICS | Facility: CLINIC | Age: 36
End: 2021-07-14

## 2021-07-14 NOTE — TELEPHONE ENCOUNTER
Central Prior Authorization Team   Phone: [phone]      PA Initiation    Medication: adapalene 0.1% gel  Insurance Company: [employer] - Phone [phone] Fax [phone]  Pharmacy Filling the Rx: [id number] - [place], [place] - [address]  Filling Pharmacy Phone: [phone]  Filling Pharmacy Fax:    Start Date: 7/14/2021

## 2021-07-14 NOTE — TELEPHONE ENCOUNTER
Prior Authorization Retail Medication Request    Medication/Dose: adapalene 0.1% gel  ICD code (if different than what is on RX):    Previously Tried and Failed:  OTC topicals, acne cleansers, acne serums  Rationale:  Patient tried sample of differin and worked well    Insurance Name:  [employer]  Insurance ID:  [id number]    Pharmacy Information (if different than what is on RX)  Name:    Phone:      Fax from [facility] drug not on formulary - call [phone] for [facility]

## 2021-07-15 NOTE — TELEPHONE ENCOUNTER
PRIOR AUTHORIZATION DENIED    Medication: adapalene 0.1% gel    Denial Date: 7/15/2021    Denial Rational:                Appeal Information:    If you would like to appeal, please supply P/A team with a letter of medical necessity with clinical reason.

## 2021-07-26 ENCOUNTER — VIRTUAL VISIT (OUTPATIENT)
Dept: PSYCHOLOGY | Facility: CLINIC | Age: 36
End: 2021-07-26
Payer: COMMERCIAL

## 2021-07-26 DIAGNOSIS — F41.1 GENERALIZED ANXIETY DISORDER: ICD-10-CM

## 2021-07-26 DIAGNOSIS — F88 DEVELOPMENTAL MENTAL DISORDER: Primary | ICD-10-CM

## 2021-07-26 DIAGNOSIS — F33.42 MAJOR DEPRESSIVE DISORDER, RECURRENT, IN FULL REMISSION (H): ICD-10-CM

## 2021-07-26 PROCEDURE — 90834 PSYTX W PT 45 MINUTES: CPT | Mod: GT | Performed by: PSYCHOLOGIST

## 2021-07-26 ASSESSMENT — PATIENT HEALTH QUESTIONNAIRE - PHQ9
SUM OF ALL RESPONSES TO PHQ QUESTIONS 1-9: 9
5. POOR APPETITE OR OVEREATING: SEVERAL DAYS

## 2021-07-26 ASSESSMENT — COLUMBIA-SUICIDE SEVERITY RATING SCALE - C-SSRS
2. HAVE YOU ACTUALLY HAD ANY THOUGHTS OF KILLING YOURSELF?: NO
1. IN THE PAST MONTH, HAVE YOU WISHED YOU WERE DEAD OR WISHED YOU COULD GO TO SLEEP AND NOT WAKE UP?: NO
1. IN THE PAST MONTH, HAVE YOU WISHED YOU WERE DEAD OR WISHED YOU COULD GO TO SLEEP AND NOT WAKE UP?: NO
2. HAVE YOU ACTUALLY HAD ANY THOUGHTS OF KILLING YOURSELF LIFETIME?: NO

## 2021-07-26 ASSESSMENT — ANXIETY QUESTIONNAIRES
7. FEELING AFRAID AS IF SOMETHING AWFUL MIGHT HAPPEN: NOT AT ALL
1. FEELING NERVOUS, ANXIOUS, OR ON EDGE: MORE THAN HALF THE DAYS
3. WORRYING TOO MUCH ABOUT DIFFERENT THINGS: MORE THAN HALF THE DAYS
2. NOT BEING ABLE TO STOP OR CONTROL WORRYING: SEVERAL DAYS
6. BECOMING EASILY ANNOYED OR IRRITABLE: MORE THAN HALF THE DAYS
5. BEING SO RESTLESS THAT IT IS HARD TO SIT STILL: NOT AT ALL
GAD7 TOTAL SCORE: 8

## 2021-07-26 NOTE — PROGRESS NOTES
Progress Note - Initial Visit    Client Name:  Gay Cummins Date: 2021         Service Type: Individual     Visit Start Time: 9:00am  Visit End Time: 9:45am    Visit #: 1    Attendees: Client attended alone    Service Modality:  Video Visit:      Provider verified identity through the following two step process.  Patient provided:  Patient  and Patient address    Telemedicine Visit: The patient's condition can be safely assessed and treated via synchronous audio and visual telemedicine encounter.      Reason for Telemedicine Visit: Services only offered telehealth    Originating Site (Patient Location): Patient's home    Distant Site (Provider Location): Provider Remote Setting- Home Office    Consent:  The patient/guardian has verbally consented to: the potential risks and benefits of telemedicine (video visit) versus in person care; bill my insurance or make self-payment for services provided; and responsibility for payment of non-covered services.     Patient would like the video invitation sent by:  Send to e-mail at: [email or web address]    Mode of Communication:  Video Conference via Amwell    As the provider I attest to compliance with applicable laws and regulations related to telemedicine.       DATA:   Interactive Complexity: No   Crisis: No     Presenting Concerns/Current Stressors:   Patient presented to session to initiate the ADHD evaluation process.      ASSESSMENT:  Mental Status Assessment:  Appearance:   Appropriate   Eye Contact:   Good   Psychomotor Behavior: Normal   Attitude:   Cooperative   Orientation:   All  Speech   Rate / Production: Normal/ Responsive   Volume:  Normal   Mood:    Normal  Affect:    Appropriate  Tearful  Thought Content:  Clear   Thought Form:  Logical   Insight:    Good       Safety Issues and Plan for Safety and Risk Management:     [place] Suicide Severity Rating Scale (Lifetime/Recent)  [place] Suicide Severity Rating  (Lifetime/Recent) 7/26/2021   1. Wish to be Dead (Lifetime) No   1. Wish to be Dead (Recent) No   2. Non-Specific Active Suicidal Thoughts (Lifetime) No   2. Non-Specific Active Suicidal Thoughts (Recent) No     Patient denies current fears or concerns for personal safety.  Patient denies current or recent suicidal ideation or behaviors.  Patient denies current or recent homicidal ideation or behaviors.  Patient denies current or recent self injurious behavior or ideation.  Patient denies other safety concerns.  Recommended that patient call 911 or go to the local ED should there be a change in any of these risk factors.       Diagnostic Criteria:  F88:  A. A persistent pattern of inattention and/or hyperactivity-impulsivity that interferes with functioning or development, as characterized by (1) and/or (2):   1. Six or more inattention symptoms that have persisted for at least 6 months to a degree that is inconsistent with developmental level and that negatively impacts directly on social and academic/occupational activities.  B. Several symptoms (inattentive or hyperactive/impulsive) were present before the age of 12 years.  C. Several symptoms (inattentive or hyperactive/impulsive) present in ?2 settings (eg, at home, school, or work; with friends or relatives; in other activities).  D. There is clear evidence that the symptoms interfere with or reduce the quality of social, academic, or occupational functioning.  E. Symptoms do not occur exclusively during the course of schizophrenia or another psychotic disorder, and are not better explained by another mental disorder (eg, mood disorder, anxiety disorder, dissociative disorder, personality disorder, substance intoxication, or withdrawal).    F41.1:  A. Excessive anxiety and worry, occurring more days than not for at least 6 months about a number of events or activities.   B. The individual finds it difficult to control the worry.  C. The anxiety and worry are  associated with 3 or more of 6 symptoms.  D. The anxiety, worry, or physical symptoms cause clinically significant distress or impairment in social, occupational, or other important areas of functioning.  E. The disturbance is not attributable to the physiological effects of a substance (e.g., a drug of abuse, a medication) or another medical condition (e.g., hyperthyroidism).  F. The disturbance is not better explained by another mental disorder (e.g., anxiety or worry about having panic attacks in panic disorder, negative evaluation in social anxiety disorder [social phobia], contamination or other obsessions in obsessive-compulsive disorder, separation from attachment figures in separation anxiety disorder, reminders of traumatic events in posttraumatic stress disorder, gaining weight in anorexia nervosa, physical complaints in somatic symptom disorder, perceived appearance flaws in body dysmorphic disorder, having a serious illness in illness anxiety disorder, or the content of delusional beliefs in schizophrenia or delusional disorder).      DSM5 Diagnoses: (Sustained by DSM5 Criteria Listed Above)  Diagnoses:   1. Developmental mental disorder    2. Generalized anxiety disorder    3. Major depressive disorder, recurrent, in full remission (H)      Psychosocial & Contextual Factors: social support, works as a nurse in a hospital  WHODAS 2.0 (12 item):   WHODAS 2.0 Total Score 7/26/2021   Total Score 19       Intervention:              Reviewed symptoms and history of presenting concern. Unable to complete diagnostic intake, will be completed in next session.    CBT: socratic questioning, positive reinforcement  EFT: empathetic attunement  MI: open ended questions, affirmations, reflections        Attendance Agreement:  Client has not signed the attendance agreement. Discussed expectations at beginning of this first session and patient agreed.       PLAN:    Provider will continue Diagnostic Assessment in next  session. Patient will complete Huy questionnaires prior to next session (8/4/2021).    Medical necessity criteria is warranted in order to: Measure a psychological disorder and its severity and functional impairment to determine psychiatric diagnosis when a mental illness is suspected, or to achieve a differential diagnosis from a range of medical/psychological disorders that present with similar constellations of symptoms (e.g., determination and measurement of anxiety severity and impact in the presence of ongoing asthma or heart disease), Perform symptom measurement to objectively measure treatment effectiveness and/or determine the need to refer for pharmacological treatment or other medical evaluation (e.g., based on severity and chronicity of symptoms) and Evaluate primary symptoms of impaired attention and concentration that can occur in many neurological and psychiatric conditions    Medical necessity for psychological assessment is warranted as a result of the following: (1) A specific clinical question is posed that relates to the condition/symptoms being addressed (2) The question cannot be adequately addressed by clinical interview and/or behavioral observation (3) Results of psychological testing are reasonably expected to provide an answer to the query (4) It is reasonably expected that the testing will provide information leading to a clearer diagnosis and/or guide treatment planning with an expectation of improved clinical outcome.    I acknowledge that, based upon current clinical information, the patient and I have reviewed and discussed issues pertaining to the purpose of therapy/testing, potential therapeutic goals, procedures, risks and benefits and estimated duration of therapy/testing. Issues pertaining to fees and confidentiality were also addressed with the patient indicated understanding. The patient was informed that their symptoms may change during the course of assessment, for better  or worse, and this may impact the clinical approach and/or the duration of treatment; the patient understands that it is imperative that I am kept informed of the changes when they occur. I will not be providing any experimental procedures and, if we agree that a change in clinical procedure would be more beneficial, I will obtain specific consent for that procedure or refer you to another provider who has expertise in that area.       Irma Obrien PsyD,   Clinical Psychologist

## 2021-07-27 ASSESSMENT — ANXIETY QUESTIONNAIRES: GAD7 TOTAL SCORE: 8

## 2021-08-03 NOTE — PROGRESS NOTES
"Clinical Pharmacy Consult:                                                    Gay Cummins is a 36 year old female called for a clinical pharmacist consult.  She was referred to me from psychiatry - TED Schmidt.     Reason for Consult: [employer] pharmac[employer] testing result review.     Discussion:   Current medications:   Citalopram 40mg daily  Ativan 0.5mg QID PRN [takes very rarely]    Gay is a 36 year old with diagnoses of MDD, SANDEEP, and insomnia. She is seen by TED Schmidt through [facility]. Most recent psych visit was 21 and assessment included: \"Today we reviewed her [employer] test results.  She agreed to a trial of quetiapine 25 mg at bedtime to help her sleep better, decrease mood dysregulation and decrease anxiety symptoms.  The thought is to introduce desvenlafaxine in the future while tapering off of the citalopram for her anxiety symptoms\". Subsequently, pt sent [employer] message to Dr. Orlando 21 and Seroquel was stopped due to oversedation (mental and physical exhaustion).     Today, pt reports her biggest struggle is with anxiety symptoms. She also has trouble with focus and concentration and will be undergoing additional testing for ADHD. She feels that Celexa has been maybe somewhat helpful and she would be potentially open to considering switching antidepressants, but she also asks about potential adjunctive agents.     Pharmacogenetic Results:     Relevant PGx Results are summarized below. For complete result report, see media tab entry from 21.       Gene Phenotype Current Medications Impacted   CY Ultrarapid Metabolizer none   CYP2B6 Intermediate Metabolizer none   CYP2D6 Intermediate Metabolizer none     1. Pt is an ultrarapid metabolizer of CY                - This can result in lower than expected serum levels at normal dosing of drugs predominately metabolized by this enzyme(s).                 2. Pt is " an intermediate metabolizer of CYP2B6 and CYP2D6                 - This can result in higher than expected serum levels at normal dosing of drugs predominately metabolized by these enzyme(s).                 These polymorphisms do not impact any currently prescribed medications, but could impact several future medication options. Given that she has been on the max dose of citalopram for some time it could be reasonable to consider switching to an alternate antidepressant. Pristiq is certainly a reasonable option - other options may include switching to Viibryd or considering adjunctive therapy with Buspar or gabapentin. All of these options would be metabolized normally by patient.     Plan:  1. PGx results reviewed with patient. Summary and potential considerations included above.  2. Full MTM not covered by pt's insurance therefore PGx consult/review completed today.    3. Next visit with Luciana Orlando NP 8/25/21    Debora Rawls, PharmD, BCPP  Medication Therapy Management Pharmacist  [facility]

## 2021-08-04 ENCOUNTER — VIRTUAL VISIT (OUTPATIENT)
Dept: PSYCHOLOGY | Facility: CLINIC | Age: 36
End: 2021-08-04
Payer: COMMERCIAL

## 2021-08-04 DIAGNOSIS — F41.1 GENERALIZED ANXIETY DISORDER: ICD-10-CM

## 2021-08-04 DIAGNOSIS — F88 DEVELOPMENTAL MENTAL DISORDER: Primary | ICD-10-CM

## 2021-08-04 DIAGNOSIS — F33.42 MAJOR DEPRESSIVE DISORDER, RECURRENT, IN FULL REMISSION (H): ICD-10-CM

## 2021-08-04 PROCEDURE — 90791 PSYCH DIAGNOSTIC EVALUATION: CPT | Mod: GT | Performed by: PSYCHOLOGIST

## 2021-08-04 NOTE — PROGRESS NOTES
Adult Intake Structured Interview      CLIENT'S NAME: Gay Cummins  MRN:   [id number]  :   1985 3:09 PM   DATE OF SERVICE: 21    Telemedicine Visit: The patient's condition can be safely assessed and treated via synchronous audio and visual telemedicine encounter.      Reason for Telemedicine Visit: Services only offered telehealth    Originating Site (Patient Location): Patient's place of employment    Distant Site (Provider Location): provider remote setting    Consent:  The patient/guardian has verbally consented to: the potential risks and benefits of telemedicine (video visit) versus in person care; bill my insurance or make self-payment for services provided; and responsibility for payment of non-covered services.     Mode of Communication: Video Conference via [employer]      Identifying Information:  Client is a 36 year old, ,  female. Client was referred for a diagnostic assessment by Luciana Orlando NP. The purpose of this evaluation is to assess for ADHD. Client is currently employed part-time. Client attended the session alone.      Client's Statement of Presenting Concern:  Client reported seeking services at this time for diagnostic assessment and recommendations for treatment. Client's presenting concerns include: difficulty with concentration, completing tasks, learning and memory problems, and word-finding. Specifically, the patient reported experiencing the following symptoms: making careless mistakes/problems attending to details, difficulty sustaining attention, problems listening when spoken to directly, not following through with tasks, difficulty organizing, avoiding tasks that require sustained mental effort, losing things often, being easily distracted and being forgetful. Client stated that symptoms have resulted in the following functional impairments: childcare /  "parenting, management of the household and or completion of tasks, money management, organization, relationship(s), social interactions and work / vocational responsibilities.      History of Presenting Concern:  Client reported that she has not been assessed for ADHD in the past.  She reported that symptoms began in elementary school.  The patient reported that she was diagnosed with depression and anxiety in middle school, and the symptoms have waxed and waned over the course of her life.  Anxiety symptoms continue to be significant.  She is currently working in individual psychotherapy and having psychotropic medications managed by her psychiatry provider to help control symptoms.  Client reported that other professional(s) are involved in providing support / services, as she was referred by her psychiatry provider.      Social History:  Client reported she grew up in [place]. Client was the first born of 2 children. There are no known complications during pregnancy or delivery. This is an intact family and parents remain . She denied a history of learning disorders.  She reported that reading was difficult for her in elementary school and she was placed in special education classes for it.  She denied a history of head injuries. As a child, client reported being fidgety, doodling during class, procrastinating homework, and having difficulty retaining information.  She recalled academic strengths in math and middle school as well as weaknesses in English. Client reported no difficulty with childhood peer relationships. Client reported that childhood was \"pretty good, happy, safe.\"  Client described her current relationships with family of origin as supportive with frequent communication.     Client reported that she has been  to her  for 5 years and they have 3 children together. Client identified extensive stable and meaningful social connections. Client reported that she has not been " "involved with the legal system.     Client's highest education level is college graduate.  The patient reported that she completed a bachelor's degree in psychology and communication at [employer] directly after high school.  She described that she \"barely got by\" and was put on academic probation and her first semester.  After graduating, the patient describes that she worked for a couple years before returning to her as an University to complete her bachelor's degree in nursing.  She described having difficulties during both degrees with procrastination, paying attention during lectures, feeling helpless regarding her lack of focus, and needing extra help for reading and writing tasks.  She indicated that she recorded lectures as a result of her difficulty focusing.    Client did not serve in the . 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  She has been in this position for 8 years.  She described problems with making careless mistakes, difficulty with distractibility, and difficulty completing charting.    There are no ethnic, cultural or Christian factors that may be relevant for therapy.     Client identified preferred language to be English. Client reported she does not need the assistance of an  or other support involved in treatment. Modifications will not be used to assist communication in treatment.     Client reports family history includes family history includes Breast Cancer in her paternal grandmother; Cerebrovascular Disease (age of onset: 75) in her maternal grandmother; Diabetes Type 2  in her maternal grandfather; Family History Negative in her father; Heart Disease in her paternal grandfather; Lung Cancer in her maternal grandfather; Rheumatoid Arthritis (age of onset: 50) in her mother; Thyroid Disease in her maternal grandmother.      Mental Health History:  Client reported the following biological family members or relatives with mental " health issues: Mother experienced an Anxiety Disorder and Depression and Sister experienced an Anxiety Disorder and Depression.  Client previously received the following mental health diagnosis: an Anxiety Disorder and Depression.   Client has received the following mental health services in the past: counseling, medication(s) from physician / PCP and psychiatry.   Hospitalizations: None.   Previous/Current commitments: None.   Client is currently receiving the following services: counseling and psychiatry.      Chemical Health History:  Client reported the following biological family members or relatives with chemical health issues: Mother reportedly uses alcohol .   Client has not received chemical dependency treatment in the past.   Client is not currently receiving any chemical dependency treatment.   Client reports no problems as a result of their drinking / drug use.        Client Reports:  Alcohol: Currently drinking about 2 glasses of wine per night.  Tobacco: None.  Cannabis: Experimentation in college.  Caffeine: 2 cups of coffee each morning.  Street Drugs: None.  Prescription Drugs: None.    CAGE: None of the patient's responses to the CAGE screening were positive / Negative CAGE score   Based on the negative Cage-Aid score and clinical interview there are not indications of drug or alcohol abuse.      Significant Losses / Trauma / Abuse / Neglect Issues:  There are no indications or report of: significant losses, trauma, abuse or neglect.    Issues of possible neglect are not present.      Medical Issues:  Client has had a physical exam to rule out medical causes for current symptoms on 11/22/2019 and reported TED Stoddard, MARY as her PCP. Client reports Luciana Orlando NP as her current psychiatrist. Client reported no current medical concerns. The client denies the presence of chronic or episodic pain. As a child, client reported having regular and consistent sleep patterns. Client  reported currently experiencing sleep disturbance, including: daytime drowsiness / fatigue.  Client reported sleeping approximately 6-7 hours per night. Client reported that she has not completed a sleep study. Client reported having an inconsistent diet. There are not significant nutritional concerns. Client reported sporadic exercise patterns.    Current Outpatient Medications   Medication     adapalene (DIFFERIN) 0.1 % external gel     citalopram (CELEXA) 40 MG tablet     LORazepam (ATIVAN) 0.5 MG tablet     No current facility-administered medications for this visit.       Client Allergies:  No Known Allergies    Medical History:  Past Medical History:   Diagnosis Date     Anxiety     in High school     Depression     in High school         Medication Adherence:  Client reports taking prescribed medications as prescribed.      Risk Taking Behaviors:  Client reported no history of risk taking behaviors      Motor Vehicle Operation:  Client has a valid 's license. She denied problems with focus and distractibility while driving.    Mental Status Assessment:  Appearance:   Appropriate   Eye Contact:   Good   Psychomotor Behavior: Normal   Attitude:   Cooperative   Orientation:   All  Speech   Rate / Production: Normal/ Responsive   Volume:  Normal   Mood:    Normal  Affect:    Appropriate   Thought Content:  Clear   Thought Form:  Logical   Insight:    Good       Review of Symptoms:  Depression: Sleep Energy Concentration Appetite Worthless  Jo Ann:  No symptoms  Psychosis: No symptoms  Anxiety: Worries Nervousness Irritable  Panic:  No symptoms  Post Traumatic Stress Disorder: No symptoms  Obsessive Compulsive Disorder: No symptoms  Eating Disorder: No symptoms  Oppositional Defiant Disorder: No symptoms  ADD / ADHD: Attention Listening Task Completion Organization Distractiblity Forgetful  Conduct Disorder: No symptoms  Reckless Behavior: No symptoms      Safety Issues and Plan for Safety and Risk  Management:  Client denies a history of suicidal ideation, suicide attempts, self-injurious behavior, homicidal ideation, homicidal behavior and and other safety concerns  Client denies current fears or concerns for personal safety.  Client denies current or recent suicidal ideation or behaviors.  Client denies current or recent homicidal ideation or behaviors.  Client denies current or recent self injurious behavior or ideation.  Client denies other safety concerns.  Recommended that patient call 911 or go to the local ED should there be a change in any of these risk factors.      Patient's Strengths and Limitations:  Client identified the following strengths or resources that will aid success in counseling: friends / good social support, family support, insight, intelligence, positive work environment and work ethic. Client identified the following supports: family and friends. Things that may interfere with the clients success in counseling include: none identified.      Diagnostic Criteria:  F88:  A. A persistent pattern of inattention and/or hyperactivity-impulsivity that interferes with functioning or development, as characterized by (1) and/or (2):   1. Six or more inattention symptoms that have persisted for at least 6 months to a degree that is inconsistent with developmental level and that negatively impacts directly on social and academic/occupational activities.  B. Several symptoms (inattentive or hyperactive/impulsive) were present before the age of 12 years.  C. Several symptoms (inattentive or hyperactive/impulsive) present in ?2 settings (eg, at home, school, or work; with friends or relatives; in other activities).  D. There is clear evidence that the symptoms interfere with or reduce the quality of social, academic, or occupational functioning.  E. Symptoms do not occur exclusively during the course of schizophrenia or another psychotic disorder, and are not better explained by another mental  disorder (eg, mood disorder, anxiety disorder, dissociative disorder, personality disorder, substance intoxication, or withdrawal).    F41.1:  A. Excessive anxiety and worry, occurring more days than not for at least 6 months about a number of events or activities.   B. The individual finds it difficult to control the worry.  C. The anxiety and worry are associated with 3 or more of 6 symptoms.  D. The anxiety, worry, or physical symptoms cause clinically significant distress or impairment in social, occupational, or other important areas of functioning.  E. The disturbance is not attributable to the physiological effects of a substance (e.g., a drug of abuse, a medication) or another medical condition (e.g., hyperthyroidism).  F. The disturbance is not better explained by another mental disorder (e.g., anxiety or worry about having panic attacks in panic disorder, negative evaluation in social anxiety disorder [social phobia], contamination or other obsessions in obsessive-compulsive disorder, separation from attachment figures in separation anxiety disorder, reminders of traumatic events in posttraumatic stress disorder, gaining weight in anorexia nervosa, physical complaints in somatic symptom disorder, perceived appearance flaws in body dysmorphic disorder, having a serious illness in illness anxiety disorder, or the content of delusional beliefs in schizophrenia or delusional disorder).      Functional Status:  Client's symptoms have caused and are causing reduced functional status in the following areas: Academics / Education - Previous difficulty focusing during class, procrastinating coursework  Activities of Daily Living - Forgetful, disorganized  Financial management Impulsive spending  Occupational / Vocational - Making careless mistakes, distractible  Social / Relational - Zones out during conversations      DSM 5:  1. Developmental mental disorder    2. Generalized anxiety disorder    3. Major  depressive disorder, recurrent, in full remission (H)             Psychosocial Factors: social support, working part-time, overwhelmed with managing 3 children and differing work schedules    WHODAS 2.0 Total Score 7/26/2021   Total Score 19     PHQ-9 SCORE 3/11/2021 3/24/2021 6/23/2021 7/26/2021   PHQ-9 Total Score MyChart - - 5 (Mild depression) -   PHQ-9 Total Score 11 9 5 9       SANDEEP-7 SCORE 3/24/2021 6/23/2021 7/26/2021   Total Score - 14 (moderate anxiety) -   Total Score 7 14 8       Attendance Agreement:  Client has signed Attendance Agreement: No, discussed expectations at beginning of first session and patient agreed.       Preliminary Plan:  Patient submitted Huy questionnaires. Patient agreed to complete MMPI-2 as soon as possible, as feedback session was scheduled for 8/19/2021.  If the test is not completed about 1 week prior to the feedback session, the feedback session will need to be rescheduled.  My contact information was provided.  Patient was in agreement to this plan.    The client reports no currently identified Mu-ism, ethnic or cultural issues relevant to therapy.     services are not indicated.    Modifications to assist communication are not indicated.    Collaboration / coordination with other professionals is not indicated at this time.    Referral to another professional/service is not indicated at this time..    A Release of Information is not needed at this time.    Report to child / adult protection services was NA.    Patient will have open access to their mental health medical record.    Parts of this documentation may have been completed using dictation software. Potential errors may result and are unintentional.       Irma Obrien PsyD,   Clinical Psychologist

## 2021-08-06 ENCOUNTER — OFFICE VISIT (OUTPATIENT)
Dept: PHARMACY | Facility: CLINIC | Age: 36
End: 2021-08-06
Payer: COMMERCIAL

## 2021-08-06 DIAGNOSIS — F41.1 GAD (GENERALIZED ANXIETY DISORDER): Primary | ICD-10-CM

## 2021-08-06 PROCEDURE — 99207 PR NO CHARGE LOS: CPT | Performed by: PHARMACIST

## 2021-08-06 NOTE — Clinical Note
Pierre Chowdhury,     Thanks for the referral - please see note for summary of pertinent PGx findings and potential med considerations.     Thanks!    Debora Rawls, PharmD, BCPP  Medication Therapy Management Pharmacist  [facility]

## 2021-08-19 ENCOUNTER — VIRTUAL VISIT (OUTPATIENT)
Dept: PSYCHOLOGY | Facility: CLINIC | Age: 36
End: 2021-08-19
Payer: COMMERCIAL

## 2021-08-19 DIAGNOSIS — F33.42 MAJOR DEPRESSIVE DISORDER, RECURRENT EPISODE, IN FULL REMISSION (H): ICD-10-CM

## 2021-08-19 DIAGNOSIS — F41.1 GENERALIZED ANXIETY DISORDER: ICD-10-CM

## 2021-08-19 DIAGNOSIS — F90.0 ATTENTION DEFICIT HYPERACTIVITY DISORDER, INATTENTIVE TYPE, MODERATE: Primary | ICD-10-CM

## 2021-08-19 PROCEDURE — 96130 PSYCL TST EVAL PHYS/QHP 1ST: CPT | Mod: GT | Performed by: PSYCHOLOGIST

## 2021-08-19 PROCEDURE — 96131 PSYCL TST EVAL PHYS/QHP EA: CPT | Mod: GT | Performed by: PSYCHOLOGIST

## 2021-08-19 NOTE — PROGRESS NOTES
"    Psychological Assessment Report    Patient: Gay Cummins  YOB: 1985  MRN: [id number]  Date(s) of assessment: 7/26/2021 and 8/6/2021  Referral Source: [facility]  Reason for Referral: assessing reported deficits in executive functioning     IDENTIFYING INFORMATION AND BRIEF HISTORY OF PRESENTING PROBLEM: Gay Cummins is a 36-year-old White woman who presented to the initial diagnostic intake appointments on 7/26/2021 and 8/6/2021 due to primary concerns with concentration, her ability to complete tasks, problems with learning and memory, and word-finding.     As a child, the patient reported that she was fidgety and would frequently doodle in elementary school.  She indicated having difficulty retaining information and would rewrite notes as a result.  She also stated that reading was a struggle and she was placed in special education small groups in order to help with this skill.  She further explained having difficulty doing homework in a timely fashion, as she  had no sense of urgency to get it done\" I would wake up early to do it the morning it was due.  She denied problems with organization, being disruptive in class, and losing items.  After high school, the patient completed her bachelor's degree in psychology and communications at [employer].  She reported that she was put on academic probation her first semester at college and needed to ask for extra help with reading and writing.  Ultimately, she described that she \"barely got by\" with her grades.  She later completed a bachelor's degree in nursing from [employer].  There, she reported recording lectures, doing assignments the morning of, and feeling helpless with her ability to focus.  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  She described problems with missing details, losing focus, being distractible, and word " finding.  Specifically, the patient reported experiencing the following symptoms: making careless mistakes/problems attending to details (forgets to bring items into the office while seeing patients, forgets steps for common procedures), difficulty sustaining attention (mind wanders a lot), problems listening when spoken to directly (zones out during conversations, her  has reportedly called her out on this), not following through with tasks (gets distracted while working on tasks, unable to leave the room while charting otherwise her charting will not get finished), difficulty organizing (house is a disaster), avoiding tasks that require sustained mental effort (does not have the willpower to start), losing things often (phone, keys,  frequently makes comments about her ability to keep track of items), being easily distracted (often feels the urge to do other things while working on a task), and being forgetful (needs a list, calendars, and many reminders, but these are sometimes ineffective).  The patient is seeking diagnostic clarification and updated treatment recommendations.    Mental Health History: The patient reported that she was diagnosed with depression and anxiety in high school. Symptoms have reportedly waxed and waned over time, as they improved in her 20s, returned after the birth of her first child about 4 years ago, and then worsening anxiety with the birth of her twins in 4/2020. Her psychiatry provider, Luciana Orlando NP, manages her prescriptions for Celexa and Ativan. 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 The patient denied a history of manic symptoms, social anxiety, phobic responses, symptoms of obsessive-compulsive disorder, trauma, and perceptual difficulties. The patient denied issues with sexual compulsivity, gambling, and disordered eating.    Developmental History: The patient reported that she believes that she is the product of a full-term pregnancy  and there were no complications during her mother's pregnancy or birth. The patient reported that she believes she met all of her developmental milestones on time. She denied a history of head injuries and learning disorders. She reported that she was placed in a special education reading group. The patient recalled academic strengths in middle school math as well as weaknesses in English.    Chemical Dependence/Substance Abuse History: The patient denied a history of chemical or substance abuse.     SOURCES OF DATA/ASSESSMENT: Review of medical and psychiatric records, consideration of behavioral observations during the testing (if applicable), and the results of the psychological tests are all considered in the preparation of this psychological test report. It is important to note that test results comprise a hypothesis of the patient s mental health concerns and are not an independent or conclusive assessment. Test results are combined with the patient s available medical, psychological, behavioral data for an integrated interpretation and report. Due to virtual/remote administration, certain aspects of the assessment process were impacted, such as access to direct patient observation, and maintaining an environment conducive to testing. As such, external factors have the potential to affect the validity of data collected.    TESTS ADMINISTERED:    Huy Adult ADHD Rating Scale-IV: Self and Other Reports (BAARS-IV)    Huy Functional Impairment Scale: Self and Other Reports (BFIS)    Huy Deficits in Executive Functioning Scale (BDEFS)    Generalized Anxiety Disorder Questionnaire (SANDEEP-7)    Patient Health Questionnaire- 9 (PHQ-9)    [place] Multiphasic Personality Inventory - 2 (MMPI - 2)     BEHAVIORAL OBSERVATIONS: The patient was pleasant and cooperative throughout all interview and explanation of testing process. The patient was oriented to person, place, and time. Mood was neutral. Eye contact was  "adequate and speech was at normal rate and rhythm. Motor activity was appropriate. Due to virtual/remote administration, direct patient observation was not possible during the testing process, and it is unknown if the patient was able to maintain an environment conducive to testing. As such, external factors have the potential to affect the validity of data collected.     TEST RESULTS: Test results comprise a hypothesis of the patient s mental health concerns and are not an independent or conclusive assessment. Test results are combined with the patient s available medical, psychological, behavioral, and observational data for an integrated interpretation and report.    Huy Adult ADHD Rating Scale-IV: Self and Other Reports (BAARS-IV)  The BAARS-IV assesses for symptoms of ADHD that are experienced in one's daily life. This assessment measure includes self and collateral rating scales designed to provide information regarding current and childhood symptoms of ADHD including inattention, hyperactivity, and impulsivity. Self-report scores are reported as percentiles. Scores at the 76th-83rd percentile are considered marginal, scores at the 84th-92nd percentile are considered borderline, scores at the 93rd-95th percentile are considered mild, scores at the 96th-98th percentile are considered moderate, and those at the 99th percentile are considered severe. Collateral or \"other\" rating scales are reported as number of symptoms observed in comparison to those reported by the client. Norms and percentile scores are not available for collateral reports.     Current Symptoms Scale--Self Report:   Client completed the self-report inventory of current symptoms. The results indicate that the client's Total ADHD Score was 41 which places the patient in the 93rd percentile for overall ADHD symptoms. In addition, the client endorsed 8/9 (98th percentile) Inattention symptoms, 0/9 (1-75th percentile) Hyperactivity-Impulsivity " symptoms, and 2/9 (85th percentile) Sluggish Cognitive Tempo symptoms. Client indicated that the reported symptoms have resulted in impaired functioning at home and work. Overall, the results suggest the client is experiencing no-moderate ADHD symptoms.     Current Symptoms Scale--Other Report:  Client's spouse completed the collateral report inventory of current symptoms. Based on the collateral contact's observation of symptoms, the client demonstrates 1/9 Inattention symptoms, 0/5 Hyperactivity symptoms, 0/4 Impulsivity symptoms, and 0/9 Sluggish Cognitive Tempo symptoms. The client's Total ADHD Score was 31. The collateral contact indicated the client demonstrates impaired functioning at home.  The collateral- and self-report scores are significantly different.    Childhood Symptoms Scale--Self-Report:  Client completed the self-report inventory of childhood symptoms. The results indicate that the client's Total ADHD Score was 21 which places the patient in the 1-50th percentile for overall ADHD symptoms in childhood. In addition, the client endorsed 0/9 (1-75th percentile) Inattention symptoms and 0/9 (1-75th percentile) Hyperactivity-Impulsivity symptoms. Client indicated that the reported symptoms resulted in impaired functioning at home. Overall, the results suggest the client experienced no significant symptoms of ADHD as a child.     Childhood Symptoms Scale--Other Report:  Client's mother completed the collateral report inventory of childhood symptoms. Based on the collateral contact's recollection of client's childhood symptoms, the client demonstrated 0/9 Inattention symptoms and 0/9 Hyperactivity-Impulsivity symptoms. The client's Total ADHD Score was 20. The collateral contact indicated the client demonstrates impaired functioning in no areas. The collateral- and self-report scores are not significantly different.                           Huy Functional Impairment Scale: Self and Other Reports  "(BFIS)  The BFIS is used to assess an individuals' psychosocial impairment in major life/daily activities that may be due to a mental health disorder. This assessment measure includes self and collateral rating scales. Self-report scores are reported as percentiles. Scores at the 76th-83rd percentile are considered marginal, scores at the 84th-92nd percentile are considered borderline, scores at the 93rd-95th percentile are considered mild, scores at the 96th-98th percentile are considered moderate, and those at the 99th percentile are considered severe. Collateral or \"other\" rating scales are reported as number of symptoms observed in comparison to those reported by the client. Norms and percentile scores are not available for collateral reports.     Results indicate the client identified impairment (scores at or greater than 93rd percentile) in the following areas: home-chores and health maintenance. The client's Mean Impairment Score was 3.0 (51-75th percentile) indicating the client is reporting 13% impairment in functioning across domains. Client's spouse completed the collateral rating scale, which indicated somewhat similar results. The collateral contact's scores were generally lower than the client's report.     Huy Deficits in Executive Functioning Scale (BDEFS)  The BDEFS is a measure used for evaluating dimensions of adult executive functioning in daily life. This assessment measure includes self and collateral rating scales. Self-report scores are reported as percentiles. Scores at the 76th-83rd percentile are considered marginal, scores at the 84th-92nd percentile are considered borderline, scores at the 93rd-95th percentile are considered mild, scores at the 96th-98th percentile are considered moderate, and those at the 99th percentile are considered severe. Collateral or \"other\" rating scales are reported as number of symptoms observed in comparison to those reported by the client. Norms and " percentile scores are not available for collateral reports.     Results indicate the client's Total Executive Functioning Score was 221 (96th percentile). The ADHD-Executive Functioning Index score was 28 (96th percentile). These scores suggest the client has moderate deficits in executive functioning. Results indicate the client identified significant deficits in the following areas: self-management to time (severe deficits), self-organization/problem-solving (moderate deficits), self-restraint (no significant deficits), self-motivation (mild deficits) and self-regulation of emotions (borderline deficits). Client's spouse completed the collateral rating scale, which indicated somewhat discrepant results. The collateral contact's scores were generally lower than the client's report.    Generalized Anxiety Disorder Questionnaire (SANDEEP-7)  This questionnaire is designed to assess for anxiety in adults. Based on the score, the patient is experiencing mild symptoms of anxiety. Client identified the following symptoms of anxiety: feeling on edge/nervous/anxious, difficulty controlling worry, worrying about many different things, trouble relaxing, and becoming easily annoyed or irritable.    Patient Health Questionnaire- 9 (PHQ-9)   This questionnaire is designed to assess for depression in adults. Based on the score, the patient is experiencing mild symptoms of depression. Client identified the following symptoms of depression: feeling tired or having little energy, poor appetite or overeating, feeling bad about self, and poor concentration.    [place] Multiphasic Personality Inventory - 2 (MMPI-2)    The MMPI-2 was administered to evaluate current level of emotional distress. Validity profile indicates that the patient appears to have answered in a generally straightforward and consistent manner, and obtained results are considered to be reliable and valid in representing current psychological status. No items were  omitted.      Emotional Well-being: At this time, the patient is likely experiencing significant depression and anxiety symptoms. For her, these probably manifest as feeling blue, having low energy, and general anxiousness. Overall, she may have a sense of emotional discomfort and unhappiness.     Cognitions: Emotional difficulties are likely further manifesting as concentration, memory, and attention problems. She may be struggling with a sense of being mentally slowed down. These problems may be exacerbated by her tendency to focus on the negatives.     Behaviors: The patient is likely inhibited and hesitant, behaviors potentially fueled by self-doubt.     Interpersonal Style: In her relationships, the patient is probably sensitive and insecure.    SUMMARY: Gay Cummins is a 36-year-old White woman who completed psychological testing remotely/virtually due to the COVID-19 pandemic. Testing was requested to provide updated diagnostic clarification and necessary treatment recommendations.    Patient first completed a diagnostic interview in which mental health symptoms, ADHD symptoms, and background information was gathered. Patient self-reported nine symptoms of inattention and indicated that her abilities to function effectively at home and work are significantly impaired. Further, her self-reported symptoms on Huy measures of ADHD symptoms were consistent with this information. The patient made non-clinical ratings of her functioning on Huy questionnaires during childhood, but information provided during the clinical interview indicates that she has impaired functioning in elementary school. She provided further examples of problems at the time of feedback. Ultimately, given her level of impairment across time, current problems seem consistent with a neurodevelopmental basis.     An objective measure of personality indicated that the patient is likely experiencing significant depression and anxiety  symptoms at this time, consistent with self-reported information that she was diagnosed with depression and anxiety about two years ago. It is likely that her difficulties with attention, concentration, and memory are increasing and/or exacerbated by these mental health symptoms. See recommendations below.       Referral Question Response: DSM-5 criteria for ADHD:   A. Symptom Count - Are there sufficient symptoms for the diagnosis? Yes, patient did endorse sufficient symptoms.   B. Onset - Were several symptoms present before 12 years of age? Yes, despite the patient s rating of herself on Huy questionnaires, her description of her functioning in elementary school indicated impairment.   C. Pervasiveness - Are several symptoms present in at least two settings? Yes, patient reported that symptoms are problematic at home and work.   D. Impairment - Do symptoms interfere with or reduce the quality of functioning? Yes, patient is unable to complete daily and work tasks effectively.   E. Exclusions - Are symptoms better explained by another disorder or factor? No, symptoms are not better explained by anxiety symptoms. Difficulties are explained by an organic basis of inattention.     The patient meets the following DSM-5 criteria for generalized anxiety disorder:  A. Excessive anxiety and worry, occurring more days than not for at least 6 months about a number of events or activities.   B. The individual finds it difficult to control the worry.  C. The anxiety and worry are associated with 3 or more of 6 symptoms.  D. The anxiety, worry, or physical symptoms cause clinically significant distress or impairment in social, occupational, or other important areas of functioning.  E. The disturbance is not attributable to the physiological effects of a substance (e.g., a drug of abuse, a medication) or another medical condition (e.g., hyperthyroidism).  F. The disturbance is not better explained by another mental  disorder.    DIAGNOSES:  F90.0 Attention-Deficit/Hyperactivity Disorder, inattentive presentation, moderate  F41.1 Generalized Anxiety Disorder  F33.42 Major Depressive Disorder, in full remission     PLAN OF CARE:  1. Discuss the following with your psychiatry provider:  a. Consider a trial of a stimulant medication. This may help alleviate some of the patient s attentional symptoms.     2. Continue psychotherapy with Lisandro Botello.     RECOMMENDATIONS:  1. Due to the patient s reported attention, concentration, and mood difficulties, the following health/lifestyle changes when combined, can significantly improve symptoms:   a. Avoid simple carbohydrates at breakfast. Aim for only complex carbohydrates and lean protein for your morning meal.   b. Engage in aerobic exercise 3 times per week for 30 minutes, ensuring that your heart rate stays within your training zone. Further, reading the book,  Spark,  by Goldy Akers M.D can help the patient understand the benefits of exercise on the brain.   c. Research suggest that taking a high-quality multi-vitamin and antioxidant (1/2 cup of blueberries) daily in conjunction with balanced nutrition can be helpful.  d. Aim for the high end of daily water intake: around 72 ounces per day.  e. Ensure regular meals and snacks to maintain optimal attention.    2. The following may be beneficial in managing some of the patient s attention and concentration difficulties:  a. Due to the patient s difficulties with attention and concentration, consider working in a completely distraction-free area while completing tasks. Workspaces should be completely clear except for the materials needed for the current task. Both visual and auditory distractions should be decreased as much as possible.  b. Considering decreased ability to focus and maintain attention, it is recommended that the patient take frequent breaks while completing tasks. This will help to maintain attention and effort. The  "patient may benefit from the use of a 365webcall Timer. The timer works by using built-in break times. After working on a task consistently for 25 minutes, the timer reminds the user to take a five-minute break before continuing, etc. A VONTRAVELoro timer can be downloaded as a free olimpia to a phone or tablet.  c. Due to the patient s attentional and concentration symptoms, it is recommended to increase organization with the use of lists and calendars. Significantly increasing structure to the day and adhering to a set schedule can increase your ability to complete responsibilities, track deadline, etc. Breaking these tasks down into their component parts and recording them in a calendar/planner will likely be beneficial. Patient would benefit from setting feasible timelines for completion of activities. By establishing clear priorities for completing tasks, you can more likely complete the most important tasks first. The patient may also choose to elect to a friend or family member to help hold them accountable.    3. Avoid multitasking. Attempting to work on multiple tasks and projects the same increases the likelihood that an error will occur. Focus on one task at a time.     4. The patient may benefit from engaging in mindfulness practices. This may include breathing techniques, apps that provide guided meditation, or more interactive activities such as coloring.    5. Develop a \"coping skills jar/box.\" This entails designating a certain container to hold slips of paper with distraction technique ideas written on each slip of paper. Distraction techniques may include listening to a certain type of music, playing on game on your phone, doing a breathing exercise, spending time with a pet, calling a certain individual, looking at a magazine, working on a puzzle, etc. When feeling distressed, choose a slip of paper from the container and engage in that activity rather than focusing on the problem.    6. See the attached " tip sheet for more ideas as well as online and book resources.      Irma Obrien PsyD, LP  Clinical Psychologist

## 2021-08-19 NOTE — Clinical Note
Hello,     I just wanted to let you know that I have completed the ADHD assessment with this patient. As you will see in the report, data do support an ADHD diagnosis. She already has an appointment set up with you next week to discuss medication options. Let me know if you have any questions or concerns! Thanks!    Irma

## 2021-08-19 NOTE — PROGRESS NOTES
Patient Name: Gay Cummins  Date: 2021       Service Type:  Individual (ADHD feedback session)      Session Start Time: 5:02pm  Session End Time:  5:44pm     Session Length: 42 minutes    Session #: 3    Attendees: Patient attended alone    Service Modality: Video Visit:      Provider verified identity through the following two step process.  Patient provided:  Patient  and Patient address    Telemedicine Visit: The patient's condition can be safely assessed and treated via synchronous audio and visual telemedicine encounter.      Reason for Telemedicine Visit: Services only offered telehealth    Originating Site (Patient Location): Patient's home    Distant Site (Provider Location): Provider Remote Setting- Home Office    Consent:  The patient/guardian has verbally consented to: the potential risks and benefits of telemedicine (video visit) versus in person care; bill my insurance or make self-payment for services provided; and responsibility for payment of non-covered services.     Patient would like the video invitation sent by:  Send to e-mail at: [email or web address]    Mode of Communication:  Video Conference via Amwell    As the provider I attest to compliance with applicable laws and regulations related to telemedicine.      PHQ 3/24/2021 2021 2021   PHQ-9 Total Score 9 5 9   Q9: Thoughts of better off dead/self-harm past 2 weeks Not at all Not at all Not at all       SANDEEP-7 SCORE 3/24/2021 2021 2021   Total Score - 14 (moderate anxiety) -   Total Score 7 14 8         DATA      Progress Since Last Session (Related to Symptoms / Goals / Homework):   Symptoms: Stable.    Homework: Completed.      Treatment Objective(s) Addressed in This Session:   Provided feedback on ADHD evaluation. Reviewed test results in depth. Plan of care and recommendations were discussed based on testing data. See full report attached on secondary  note in this encounter.     Intervention:   Provided feedback to patient regarding testing results, diagnoses, and treatment recommendations. Test results are consistent with an ADHD diagnosis. Symptoms are not better explained by mood and anxiety disorders. Personalized suggestions regarding symptoms were offered. Patient had the opportunity to ask questions; she expressed understanding.        ASSESSMENT: Current Emotional / Mental Status (status of significant symptoms):   Risk status (Self / Other harm or suicidal ideation)   Patient denies current fears or concerns for personal safety.   Patient denies current or recent suicidal ideation or behaviors.   Patient denies current or recent homicidal ideation or behaviors.   Patient denies current or recent self injurious behavior or ideation.   Patient denies other safety concerns.   Patient reports there has been no change in risk factors since their last session.     Patient reports there has been no change in protective factors since their last session.     Recommended that patient call 911 or go to the local ED should there be a change in any of these risk factors.     Appearance:   Appropriate    Eye Contact:   Good    Psychomotor Behavior: Normal    Attitude:   Cooperative    Orientation:   All   Speech    Rate / Production: Normal     Volume:  Normal    Mood:    Normal   Affect:    Appropriate    Thought Content:  Clear    Thought Form:  Coherent  Logical    Insight:    Good      Medication Review:   No changes to current psychiatric medication(s)     Medication Compliance:   Yes     Changes in Health Issues:   None reported     Chemical Use Review:   Substance Use: Chemical use reviewed, no active concerns identified      Tobacco Use: No current tobacco use.      Diagnosis:  1. Attention deficit hyperactivity disorder, inattentive type, moderate    2. Generalized anxiety disorder    3. Major depressive disorder, recurrent episode, in full remission (H)           PLAN:   Recommendations are outlined in full evaluation report (attached to this encounter).   Patient indicated understanding and will contact the clinic if there are further questions.    Report routed to referring provider.    Parts of this documentation may have been completed using dictation software. Potential errors may result and are unintentional.       Irma Obrien PsyD,   Clinical Psychologist         Psychological Testing Services Summary       Testing Evaluation Services Base: 76137  (first 60 mins) Add-on: 64806  (each addtl 60 mins)   Record Review and Clarify Referral Question   8:50am-9:00am on 7/26/2021 10 minutes   Clinical Decision Making/Battery Modification   1:45pm-2:00pm on 8/4/2021 15 minutes   Integration/Report Generation   12:00pm-1:00pm on 8/18/2021 ([facility])  1:00pm-1:45pm on 8/18/2021 ([facility]-2)  6:00pm-7:00pm on 8/18/2021 (Final Report)   60 minutes  45 minutes  60 minutes   Interactive Feedback Session   5:02pm-5:44pm on 8/19/2021 42 minutes   Post-Service Work   5:44pm-5:59pm on 8/19/2021 15 minutes   Total Time: 247 minutes   Total Units: 1 3       Diagnoses:   F90.0 Attention-Deficit/Hyperactivity Disorder, inattentive presentation, moderate  F41.1 Generalized Anxiety Disorder  F33.42 Major Depressive Disorder, in full remission

## 2021-08-19 NOTE — PATIENT INSTRUCTIONS
Coping with Attention-Deficit/Hyperactivity Disorder (ADHD)    If you have ADHD, it can be hard to sit still, pay attention or control impulsive behavior. ADHD can cause problems at home, at school, at work and in social settings. But you can learn ways to control your symptoms. Below are some ideas for coping with the most common ADHD problems.     Memory, Focus, and Managing Time    Be mindful of time. Use a watch, phone, timer, alarm, PDA or computer--anything that keeps accurate time that you can see and hear at all times. Set more than one alarm to remind you how much time you have left for a task. Give yourself more time than you think you need. For important appointments or deadlines, set reminder alarms hours or days ahead of time.     Use a day planner. A day planner can help you manage time and remember responsibilities. Learning to use a planner is just like learning to use any tool--practice makes perfect.     Use lists. Lists and notes can help you keep track of regular tasks, projects, deadlines and appointments. If you decide to use a daily planner, keep all lists and notes inside of it. Use color codes for tasks so you know which ones are the most important.    Learn to say no and set boundaries. Do not take on too many projects or social engagements. Overbooking yourself can be overwhelming and can lead to missed commitments.    Repeat out loud the instructions you have been given. This will help you remember, as well as let others correct you if needed.    Organization    Create space. Ask yourself what you need on a daily basis. Then, find storage bins or closets for things you don t need every day. Have specific areas for things like keys, bills and other items that are easy to misplace. Throw away or donate things you don t need.     Deal with it now. You can avoid forgetfulness, clutter and procrastination by doing things right away, not some time in the future. This includes filing papers,  cleaning up messes, opening and responding to mail and returning phone calls.    Set up a filing system. Use dividers or separate file folders for different types of documents, such as medical records, receipts and pay stubs. Label and color-code your files so that you can quickly find what you need.     Break larger tasks into small, manageable pieces. Write down the steps needed to complete the task. Follow each step in order until the task is done. If needed, take small, timed breaks and return to finish the task.    Organize your work space. Use lists or planners to organize your day. Remove clutter from your desk. Label any storage bins. Reduce distraction as much as possible. Ideas include sitting facing the wall, closing your door or using a white noise machine.    Sitting Still    Move around as needed. Don t fight the urge to move around. If you need to fidget or stand up for a period of time to help maintain attention, then do so. But take care that you re not interrupting others. You may also find it helpful to squeeze a stress ball.    Be active in a useful way. Exercise can burn off extra energy, relieve stress, boost your mood and calm your mind. Meditation, yoga or debra chi can help you better control your attention and impulses.    Stay healthy. Get enough sleep. Avoid caffeine late in the day. Exercise. Create a relaxing bedtime routine. Stick to a schedule or daily routine. Eat small meals throughout the day. Avoid sugar, eat fewer carbohydrates and eat more protein.     Close Relationships    Talk to your loved ones about ADHD. There are many resources available that can help your loved one understand ADHD. See the Resources section below.    Divide daily tasks based on each person s strengths. For example, if you have trouble with organization, you may not want to do financial planning tasks.    If you have trouble with focus, develop a sign that others in your life can use as a gentle reminder to  pay attention. The sign should be small but meaningful to you and the other person.    Improve communication. Use a dry erase board in a common area to help the whole family stay in touch better. Keep regular to-do lists and compare scheduled activities every day. Writing notes to each other is also very helpful.    Be an active listener and try not to interrupt. If you find your mind wandering when others are talking, mentally repeat their words so you can follow the conversation. Ask questions and ask people to repeat what they said if needed. Pay close attention to body language.     Organize your thoughts. If you need to have a serious conversation, write a list of the points you would like to make or the important ideas you want to talk about. This can help you stay focused and remember what you need to say.    Think before speaking. It can be hard to control your impulses when you feel strongly about something. Before saying whatever pops into your head, stop to ask yourself  Will this be helpful?  or  Will this help me get what I want?     Take charge of your life. Work to accept that some things are harder for you. Make a plan to address these troubles and seek the support you need.    Online Resources    ADD WarehShootHome. http://www.RewardsForce.com    ADHD and You.  Tips for Adults with ADHD.  http://www.adhdandyou.com/adhd-patient/adhd-tips-young-adult/    Attention Deficit Disorder Association. http://www.add.org    Attention Deficit Disorder Resources. http://www.addresources.org    Children and Adults with Attention-Deficit/Hyperactivity Disorder (ELIZABETH). http://www.elizabeth.org/    Milagros Cotter.  33 Ways to Get Organized with Adult ADHD.  http://www.additudemag.com/adhd/article/729.html    National Resource Center on ADHD. http://www.shvq1rula.org/    Silvia Brink.  Daily Living Tips for Adult ADHD.  http://www.medicinenet.com/living_tips_adult_adhd_pictures_slideshow/article.htm    Grzegorz Whitfield and  Ariadne Mahmood.  Help for Adult ADD / ADHD.  http://www.helpguide.org/mental/adhd_add_adult_strategies.htm    You can also find many apps for your phone that can help you with common ADHD struggles    Book Resources    Parvez Son. ADHD in Adults. (2008).    Parvez Son. Taking Charge of Adult ADHD. (2010).    Rufino Luther and Goldy Akers. Delivered from Distraction. (2006).    Rufino Luther and Goldy Akers. Driven to Distraction. (2011).    Fallon Herrera. ADD in the Workplace. (1997).    Fallon Herrera. ADD-Friendly Ways to Organize Your Life. (2002).    Marguerite Olivier. The ADHD Effect on Marriage: Understand and Rebuild Your Relationship in Six Steps. (2010).    Mary Anne Cruz and Akosua Garay. You Mean I m Not Lazy, Stupid or Crazy? (2006).    Ángel Arriaga. Understand Your Brain, Get More Done: The ADHD Executive Functions Workbook. (2012).    Support Groups  ADHD Adult Support Group  [address].  7:00 to 8:30 p.m., last Thursday of each month (except December).  Contact/RSVP: [email or web address]    ADHD Adult Support Group  [address].  Thursday 10:00 a.m. to 12:00 p.m. or 7:00 to 9:30 p.m.  Contact/RSVP: Enrique Morley. [phone] or [email or web address]     ADHD Adult Support Group for Spouses/Partners of adults with ADHD  [address].  Tuesday 12:00 to 2:00 p.m.   Contact/RSVP: Enrique Morley. [phone] or [email or web address]

## 2021-08-25 ENCOUNTER — VIRTUAL VISIT (OUTPATIENT)
Dept: PSYCHIATRY | Facility: CLINIC | Age: 36
End: 2021-08-25
Payer: COMMERCIAL

## 2021-08-25 DIAGNOSIS — F90.0 ADHD (ATTENTION DEFICIT HYPERACTIVITY DISORDER), INATTENTIVE TYPE: Primary | ICD-10-CM

## 2021-08-25 PROCEDURE — 99214 OFFICE O/P EST MOD 30 MIN: CPT | Mod: GT | Performed by: NURSE PRACTITIONER

## 2021-08-25 RX ORDER — METHYLPHENIDATE HYDROCHLORIDE 10 MG/1
10 TABLET ORAL 2 TIMES DAILY
Qty: 30 TABLET | Refills: 0 | Status: SHIPPED | OUTPATIENT
Start: 2021-08-25 | End: 2021-09-07

## 2021-08-25 ASSESSMENT — ANXIETY QUESTIONNAIRES
5. BEING SO RESTLESS THAT IT IS HARD TO SIT STILL: SEVERAL DAYS
7. FEELING AFRAID AS IF SOMETHING AWFUL MIGHT HAPPEN: NOT AT ALL
GAD7 TOTAL SCORE: 10
8. IF YOU CHECKED OFF ANY PROBLEMS, HOW DIFFICULT HAVE THESE MADE IT FOR YOU TO DO YOUR WORK, TAKE CARE OF THINGS AT HOME, OR GET ALONG WITH OTHER PEOPLE?: VERY DIFFICULT
GAD7 TOTAL SCORE: 10
6. BECOMING EASILY ANNOYED OR IRRITABLE: MORE THAN HALF THE DAYS
4. TROUBLE RELAXING: MORE THAN HALF THE DAYS
1. FEELING NERVOUS, ANXIOUS, OR ON EDGE: MORE THAN HALF THE DAYS
7. FEELING AFRAID AS IF SOMETHING AWFUL MIGHT HAPPEN: NOT AT ALL
2. NOT BEING ABLE TO STOP OR CONTROL WORRYING: SEVERAL DAYS
3. WORRYING TOO MUCH ABOUT DIFFERENT THINGS: MORE THAN HALF THE DAYS
GAD7 TOTAL SCORE: 10

## 2021-08-25 ASSESSMENT — PATIENT HEALTH QUESTIONNAIRE - PHQ9
10. IF YOU CHECKED OFF ANY PROBLEMS, HOW DIFFICULT HAVE THESE PROBLEMS MADE IT FOR YOU TO DO YOUR WORK, TAKE CARE OF THINGS AT HOME, OR GET ALONG WITH OTHER PEOPLE: SOMEWHAT DIFFICULT
SUM OF ALL RESPONSES TO PHQ QUESTIONS 1-9: 8
SUM OF ALL RESPONSES TO PHQ QUESTIONS 1-9: 8

## 2021-08-25 NOTE — PROGRESS NOTES
"Location of patient:  Home    Any new OTC medications: No  Recent height or weight: No  Recent BP/HR: Yes 110/64  Alcohol use:  Yes 1-2 glasses of wine each nigh If yes, how many drinks per week: one   Current other substance use (including Vaping):  Denies  Any updates with mental health (hospital stay, ER, etc) that Luciana should know about: No  Taking medications every day: Yes  If female: Birth control: No  What is the most important thing you would like addressed today: ADHD medications, just had testing done and was diagnosed with ADHD     PHQ 2021   PHQ-9 Total Score 5 9 8   Q9: Thoughts of better off dead/self-harm past 2 weeks Not at all Not at all Not at all     SANDEEP-7 SCORE 2021   Total Score 14 (moderate anxiety) - 10 (moderate anxiety)   Total Score 14 8 10            Outpatient Psychiatric Progress Note    Name: Gay Cummins   : 1985                    Primary Care Provider: TED Grace CNP   Therapist: None     PHQ-9 scores:  PHQ-9 SCORE 2021   PHQ-9 Total Score MyChart 5 (Mild depression) - 8 (Mild depression)   PHQ-9 Total Score 5 9 8       SANDEEP-7 scores:  SANDEEP-7 SCORE 2021   Total Score 14 (moderate anxiety) - 10 (moderate anxiety)   Total Score 14 8 10       Patient Identification:    Patient is a 36 year old year old,   White Other female  who presents for return visit with me.  Patient is currently employed part time. Patient attended the session alone. Patient prefers to be called: \" Gay\".    Interim History:    I last saw Gay Cummins for outpatient psychiatry Return Visit on 2021.     During that appointment, she reported ongoing irritability that has increased in intensity.  She has been processing ways to cope with this with her therapist.  Today we reviewed her GeneSight test results.  She agreed to a trial of quetiapine 25 mg at bedtime to help " her sleep better, decrease mood dysregulation and decrease anxiety symptoms.  The thought is to introduce desvenlafaxine in the future while tapering off of the citalopram for her anxiety symptoms.  Multiple family stressors contribute to her mood exacerbation.  This includes financial stressors and managing a young household .     Current medications include: adapalene (DIFFERIN) 0.1 % external gel, Apply topically At Bedtime  citalopram (CELEXA) 40 MG tablet, TAKE 1 TABLET BY MOUTH EVERY DAY  LORazepam (ATIVAN) 0.5 MG tablet, Take 1 tablet (0.5 mg) by mouth every 6 hours as needed for anxiety    No current facility-administered medications on file prior to visit.   1q     The [place] Prescription Monitoring Program has been reviewed and there are no concerns about diversionary activity for controlled substances at this time.      I was able to review most recent Primary Care Provider, specialty provider, and therapy visit notes that I have access to.       Today she reports that she finished testing and received a diagnosis of ADHD.  She is having difficulties with bouncing thoughts that decrease focus and concentration..  Today, patient reports  that she has anxiety.  She talked to a pharmacist about Gene Sight test results.       has a past medical history of Anxiety and Depression. She also has no past medical history of Chronic infection, History of blood transfusion, Malignant hyperthermia, or Sleep apnea.    Social history updates:    No changes in her social history reported    Substance use updates:    No alcohol use reported  Tobacco use: No    Vital Signs:   There were no vitals taken for this visit.    Labs:    Most recent laboratory results reviewed and no new labs.     Review of Systems:  10 systems (general, cardiovascular, respiratory, eyes, ENT, endocrine, GI, , M/S, neurological) were reviewed. Most pertinent finding(s) is/are: He reports no skin rashes, no shortness of breath, no chest pain.  The remaining systems are all unremarkable.    Mental Status Examination:  Appearance:  awake, alert and mild distress  Attitude:  cooperative   Eye Contact:  adequate  Gait and Station: No assistive Devices used and No dizziness or falls  Psychomotor Behavior:  intact station, gait and muscle tone  Oriented to:  time, person, and place  Attention Span and Concentration:  Normal  Speech:   clear, coherent and Speaks: English  Mood:  anxious  Affect:  mood congruent and intensity is heightened  Associations:  no loose associations  Thought Process:  goal oriented  Thought Content:  no evidence of suicidal ideation or homicidal ideation, no auditory hallucinations present and no visual hallucinations present  Recent and Remote Memory:  intact Not formally assessed. No amnesia.  Fund of Knowledge: appropriate  Insight:  good  Judgment:  intact  Impulse Control:  intact    Suicide Risk Assessment:  Today Gay Cummins reports that she is having no thoughts to want to end her life or to harm other people. In addition, there are notable risk factors for self-harm, including anxiety and mood change. However, risk is mitigated by commitment to family, history of seeking help when needed, future oriented, denies suicidal intent or plan and denies homicidal ideation, intent, or plan. Therefore, based on all available evidence including the factors cited above, Gay Cummins does not appear to be at imminent risk for self-harm, does not meet criteria for a 72-hr hold, and therefore remains appropriate for ongoing outpatient level of care.  A thorough assessment of risk factors related to suicide and self-harm have been reviewed and are noted above. The patient convincingly denies suicidality on several occasions. Local community safety resources printed and reviewed for patient to use if needed. There was no deceit detected, and the patient presented in a manner that was believable.     DSM5  Diagnosis:  Attention-Deficit/Hyperactivity Disorder  314.00 (F90.0) Predominantly inattentive presentation  296.31 (F33.0) Major Depressive Disorder, Recurrent Episode, Mild _ and With mixed features  300.02 (F41.1) Generalized Anxiety Disorder    Medical comorbidities include:   Patient Active Problem List    Diagnosis Date Noted     Delivery of pregnancy by  section 2020     Priority: Medium     Encounter for triage in pregnant patient 2020     Priority: Medium     History of  delivery 2019     Priority: Medium     Moderate episode of recurrent major depressive disorder (H) 2017     Priority: Medium     SANDEEP (generalized anxiety disorder) 2017     Priority: Medium     Sacroiliac joint pain 2017     Priority: Medium       Assessment:    Gay NARESH Lockeon continues with concerns about poor focus and concentration.  Recent psychological testing has confirmed diagnosis of ADHD and methylphenidate will be added at 10 mg twice daily.  If this creates too much anxiety and does not help improve attention deficits, we talked about considering changing from methylphenidate to Strattera.  I asked her to consider adding gabapentin or buspirone for her anxiety symptoms.  She will continue citalopram 40 mg daily for depression.  She is strongly urged to consider talk therapy..    Medication side effects and alternatives were reviewed. Health promotion activities recommended and reviewed today. All questions addressed. Education and counseling completed regarding risks and benefits of medications and psychotherapy options.    Treatment Plan:        1.  Add methylphenidate 10 mg twice daily-consider changing to Strattera if this is ineffective in managing your ADHD symptoms    2.  Continue citalopram 40 mg daily    3.  Consider adding gabapentin or buspirone 2-3 times daily for your anxiety symptoms    4.  Talk therapy to manage life stressors when able to      Continue all other  medications as reviewed per electronic medical record today.     Safety plan reviewed. To the Emergency Department as needed or call after hours crisis line at [phone] or [phone]. [place] Crisis Text [place]. Text [place] to 131189 or Suicide LifeLine Chat: suicideImpossible Software.org/chat/    To schedule individual or family therapy, call [facility] at [phone].    Schedule an appointment with me in October or sooner as needed. Call [facility] at [phone] to schedule.    Follow up with primary care provider as planned or for acute medical concerns.    Call the psychiatric nurse line with medication questions or concerns at [phone].    [employer] may be used to communicate with your provider, but this is not intended to be used for emergencies.    Crisis Resources:    National Suicide Prevention Lifeline: 744.254.1582 (TTY: 319.273.3256). Call anytime for help.  (www.suicidepreventionlifeline.org)  National [place] on Mental Illness (www.constanza.org): 615.807.3417 or 144-303-5966.   Mental Health Association (www.mentalhealth.org): 417.653.6359 or 381-475-2310.  [place] Crisis Text [place]: Text [place] to 803335  Suicide LifeLine Chat: suicideImpossible Software.org/chat    Administrative Billing:   Time spent with patient was 30 minutes and greater than 50% of time or 20 minutes was spent in counseling and coordination of care regarding above diagnoses and treatment plan.    Patient Status:  Patient will continue to be seen for ongoing consultation and stabilization.    Signed:   HAORLDO Schmidt-BC   Psychiatry

## 2021-08-25 NOTE — PATIENT INSTRUCTIONS
1.  Add methylphenidate 10 mg twice daily-consider changing to Strattera if this is ineffective in managing your ADHD symptoms    2.  Continue citalopram 40 mg daily    3.  Consider adding gabapentin or buspirone 2-3 times daily for your anxiety symptoms    4.  Talk therapy to manage life stressors when able to      Continue all other medications as reviewed per electronic medical record today.     Safety plan reviewed. To the Emergency Department as needed or call after hours crisis line at [phone] or [phone]. [place] Crisis Text Line. Text [place] to 422520 or Suicide LifeLine Chat: ibeatyou.org/chat/    To schedule individual or family therapy, call [facility] at [phone].    Schedule an appointment with me in October or sooner as needed. Call [facility] at [phone] to schedule.    Follow up with primary care provider as planned or for acute medical concerns.    Call the psychiatric nurse line with medication questions or concerns at [phone].    [employer] may be used to communicate with your provider, but this is not intended to be used for emergencies.    Crisis Resources:    National Suicide Prevention Lifeline: 331.677.6695 (TTY: 672.612.6269). Call anytime for help.  (www.suicidepreventionlifeline.org)  National [place] on Mental Illness (www.constanza.org): 387.760.5353 or 154-747-6819.   Mental Health Association (www.mentalhealth.org): 308.754.8108 or 835-513-3436.  [place] Crisis Text [place]: Text [place] to 060096  Suicide LifeLine Chat: suicideGolden Property Capital.org/chat

## 2021-08-26 ASSESSMENT — ANXIETY QUESTIONNAIRES: GAD7 TOTAL SCORE: 10

## 2021-08-29 ENCOUNTER — HEALTH MAINTENANCE LETTER (OUTPATIENT)
Age: 36
End: 2021-08-29

## 2021-09-03 NOTE — PROGRESS NOTES
Doing well, already feeling pretty pregnant and having round ligament pain.  Feels nervous, like something is going to go wrong.  Nausea is better.    34 year old  at 14w6d   - di-di twins: s/p GC with normal prenatal testing, plan for level 2 anatomy US, briefly reviewed early delivery  - h/o CS x1, plans RLTCS and BTL  - discussed quickening, discussed normal physiology of pregnancy and symptoms, when to call for abnormal symptoms    RTC 4 weeks    Marlene Lo MD, MPH  [facility]       [place] #[id number]

## 2021-10-24 ENCOUNTER — HEALTH MAINTENANCE LETTER (OUTPATIENT)
Age: 36
End: 2021-10-24

## 2021-10-29 ENCOUNTER — VIRTUAL VISIT (OUTPATIENT)
Dept: PSYCHIATRY | Facility: CLINIC | Age: 36
End: 2021-10-29
Payer: COMMERCIAL

## 2021-10-29 DIAGNOSIS — F90.0 ADHD (ATTENTION DEFICIT HYPERACTIVITY DISORDER), INATTENTIVE TYPE: Primary | ICD-10-CM

## 2021-10-29 PROCEDURE — 99214 OFFICE O/P EST MOD 30 MIN: CPT | Mod: GT | Performed by: NURSE PRACTITIONER

## 2021-10-29 RX ORDER — METHYLPHENIDATE HYDROCHLORIDE 20 MG/1
20 CAPSULE, EXTENDED RELEASE ORAL DAILY
Qty: 30 CAPSULE | Refills: 0 | Status: SHIPPED | OUTPATIENT
Start: 2021-10-29 | End: 2021-11-29

## 2021-10-29 NOTE — PROGRESS NOTES
"Gay is a 36 year old who is being evaluated via a billable video visit.      How would you like to obtain your AVS? MyChart  If the video visit is dropped, the invitation should be resent by: Text to cell phone: [phone]  Will anyone else be joining your video visit? No      Video Start Time: 11:10 AM  Video-Visit Details    Type of service:  Video Visit    Video End Time:11:27 AM    Originating Location (pt. Location): Home    Distant Location (provider location):  [facility] MENTAL [facility] & ADDICTION [facility]     Platform used for Video Visit: [facility]           Outpatient Psychiatric Progress Note    Name: Gay Cummins   : 1985                    Primary Care Provider: TED Grace CNP   Therapist: Zunilda    PHQ-9 scores:  PHQ-9 SCORE 2021   PHQ-9 Total Score MyChart 5 (Mild depression) - 8 (Mild depression)   PHQ-9 Total Score 5 9 8       SANDEEP-7 scores:  SANDEEP-7 SCORE 2021   Total Score 14 (moderate anxiety) - 10 (moderate anxiety)   Total Score 14 8 10       Patient Identification:    Patient is a 36 year old year old,   White Other female  who presents for return visit with me.  Patient is currently employed full time. Patient attended the session alone. Patient prefers to be called: \" Gay\".    Interim History:    I last saw Gay Cummins for outpatient psychiatry Return Visit on 2021.     During that appointment, she reported concern about poor focus and concentration.  Recent psychological testing has confirmed diagnosis of ADHD and methylphenidate will be added at 10 mg twice daily.  If this creates too much anxiety and does not help improve attention deficits, we talked about considering changing from methylphenidate to Strattera.  I asked her to consider adding gabapentin or buspirone for her anxiety symptoms.  She will continue citalopram 40 mg daily for depression.  She is strongly urged to " consider talk therapy.. .     Current medications include: adapalene (DIFFERIN) 0.1 % external gel, Apply topically At Bedtime  citalopram (CELEXA) 40 MG tablet, TAKE 1 TABLET BY MOUTH EVERY DAY  LORazepam (ATIVAN) 0.5 MG tablet, Take 1 tablet (0.5 mg) by mouth every 6 hours as needed for anxiety  methylphenidate (RITALIN) 10 MG tablet, Take 1 tablet (10 mg) by mouth 2 times daily    No current facility-administered medications on file prior to visit.       The [place] Prescription Monitoring Program has been reviewed and there are no concerns about diversionary activity for controlled substances at this time.      I was able to review most recent Primary Care Provider, specialty provider, and therapy visit notes that I have access to.      Today, patient reports that she gets overwhelmed with caring for her children at home.  She feels more productive at work since taking the medication.  She has a big imprpovement in her activity at work.  She has not taken lorazepam since August.  She is less irritable.  No reports of sleep disturbance.       has a past medical history of Anxiety and Depression. She also has no past medical history of Chronic infection, History of blood transfusion, Malignant hyperthermia, or Sleep apnea.    Social history updates:    Gay lives with her  and 3 children.  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    Substance use updates:    No alcohol use reported  Tobacco use: No    Vital Signs:   There were no vitals taken for this visit.    Labs:    Most recent laboratory results reviewed and no new labs.       Mental Status Examination:  Appearance:  awake, alert and casually dressed  Attitude:  cooperative   Eye Contact:  adequate  Gait and Station: No assistive Devices used and No dizziness or falls  Psychomotor Behavior:  intact station, gait and muscle tone  Oriented to:  time, person, and place  Attention Span and Concentration:  Normal  Speech:   clear, coherent and  Speaks: English  Mood:  anxious, depressed and better  Affect:  mood congruent  Associations:  no loose associations  Thought Process:  goal oriented  Thought Content:  no evidence of suicidal ideation or homicidal ideation, no auditory hallucinations present and no visual hallucinations present  Recent and Remote Memory:  intact Not formally assessed. No amnesia.  Fund of Knowledge: appropriate  Insight:  good  Judgment:  intact  Impulse Control:  intact    Suicide Risk Assessment:  Today Gay Cummins reports that she is having no thoughts to want to end her life or to harm other people. In addition, there are notable risk factors for self-harm, including anxiety and mood change. However, risk is mitigated by commitment to family, sobriety, history of seeking help when needed, future oriented, denies suicidal intent or plan and denies homicidal ideation, intent, or plan. Therefore, based on all available evidence including the factors cited above, Gay Cummins does not appear to be at imminent risk for self-harm, does not meet criteria for a 72-hr hold, and therefore remains appropriate for ongoing outpatient level of care.  A thorough assessment of risk factors related to suicide and self-harm have been reviewed and are noted above. The patient convincingly denies suicidality on several occasions. Local community safety resources printed and reviewed for patient to use if needed. There was no deceit detected, and the patient presented in a manner that was believable.     DSM5 Diagnosis:  Attention-Deficit/Hyperactivity Disorder  314.00 (F90.0) Predominantly inattentive presentation  296.31 (F33.0) Major Depressive Disorder, Recurrent Episode, Mild _ and With melancholic features  300.02 (F41.1) Generalized Anxiety Disorder    Medical comorbidities include:   Patient Active Problem List    Diagnosis Date Noted     Delivery of pregnancy by  section 2020     Priority: Medium     Encounter for  triage in pregnant patient 2020     Priority: Medium     History of  delivery 2019     Priority: Medium     Moderate episode of recurrent major depressive disorder (H) 2017     Priority: Medium     SANDEEP (generalized anxiety disorder) 2017     Priority: Medium     Sacroiliac joint pain 2017     Priority: Medium       Assessment:    Gay Cummins reported that she was feeling much better since being on the Ritalin.  Her work is more productive and organized as a nurse.  She continues to feel overwhelmed at home at times with the children the multitasking involved.  She will continue citalopram 40 mg daily.  Once again I will list her considering adding gabapentin or buspirone should anxiety get out of control for her for a long period of time.  Talk therapy is also available should she feel in need to engage in this related to life changes and adjustments that have been made over this past year..    Medication side effects and alternatives were reviewed. Health promotion activities recommended and reviewed today. All questions addressed. Education and counseling completed regarding risks and benefits of medications and psychotherapy options.    Treatment Plan:        1.    Ritalin LA 20 mg daily    2.  Continue citalopram 40 mg daily    3.  Consider adding gabapentin or buspirone 2-3 times daily for your anxiety symptoms    4.  Talk therapy to manage life stressors when able to       Continue all other medications as reviewed per electronic medical record today.     Safety plan reviewed. To the Emergency Department as needed or call after hours crisis line at [phone] or [phone]. [place] Crisis [place]. Text [place] to 126029 or Suicide LifeLine Chat: suicidepreventionlifeline.org/chat/    To schedule individual or family therapy, call [facility] at [phone].    Schedule an appointment with me in 2 months or sooner as needed. Call [facility] at [phone] to schedule.    Follow up with primary care provider as planned or for acute medical concerns.    Call the psychiatric nurse line with medication questions or concerns at [phone].    [employer] may be used to communicate with your provider, but this is not intended to be used for emergencies.    Crisis Resources:    National Suicide Prevention Lifeline: 415.699.4533 (TTY: 912.216.1502). Call anytime for help.  (www.suicidepreventionlifeline.org)  National [place] on Mental Illness (www.constanza.org): 845.204.2696 or 986-723-0388.   Mental Health Association (www.mentalhealth.org): 597.148.7948 or 801-765-2741.  [place] Crisis [place]: Text [place] to 832680  Suicide LifeLine Chat: suicideprePasswordBoxline.org/chat    Administrative Billing:   Time spent with patient was spent in counseling and coordination of care regarding above diagnoses and treatment plan.    Patient Status:  Patient will continue to be seen for ongoing consultation and stabilization.    Signed:   HAROLDO Schmidt-BC   Psychiatry

## 2021-11-18 DIAGNOSIS — F41.1 GAD (GENERALIZED ANXIETY DISORDER): ICD-10-CM

## 2021-11-18 RX ORDER — CITALOPRAM HYDROBROMIDE 40 MG/1
TABLET ORAL
Qty: 90 TABLET | Refills: 0 | Status: SHIPPED | OUTPATIENT
Start: 2021-11-18 | End: 2022-01-03

## 2021-11-18 NOTE — TELEPHONE ENCOUNTER
Prescription approved per [employer] Refill Protocol. Has upcoming appointment with mental health Provider.  Izabel Trinidad RN

## 2021-11-29 ENCOUNTER — MYC REFILL (OUTPATIENT)
Dept: PSYCHIATRY | Facility: CLINIC | Age: 36
End: 2021-11-29
Payer: COMMERCIAL

## 2021-11-29 DIAGNOSIS — F90.0 ADHD (ATTENTION DEFICIT HYPERACTIVITY DISORDER), INATTENTIVE TYPE: ICD-10-CM

## 2021-11-29 RX ORDER — METHYLPHENIDATE HYDROCHLORIDE 20 MG/1
20 CAPSULE, EXTENDED RELEASE ORAL DAILY
Qty: 30 CAPSULE | Refills: 0 | Status: SHIPPED | OUTPATIENT
Start: 2021-11-29 | End: 2022-01-03 | Stop reason: DRUGHIGH

## 2021-11-29 NOTE — TELEPHONE ENCOUNTER
Date of Last Office Visit: 10/29/21  Date of Next Office Visit: 1/3/22  No shows since last visit: none  Cancellations since last visit: none    Medication requested: methylphenidate 20mg Date last ordered: 10/29/21 Qty: 30 Refills: 0     [place] of [place] ?: yes  Medication last filled date: 10/29/21 Qty filled: 30  Other [place] substance on [place] ?: yes  If yes, is this a new medication?: no      Lapse in medication adherence greater than 5 days?: no  If yes, call patient and gather details:    Medication refill request verified as identical to current order?: yes  Result of Last DAM, VPA, Li+ Level, CBC, or Carbamazepine Level (at or since last visit): N/A    Last visit treatment plan:   Last note incomplete    []Medication refilled per  Medication Refill in Ambulatory Care  policy.  [x]Medication unable to be refilled by RN due to criteria not met as indicated below:    []Eligibility - not seen in the last year   []Supervision - no future appointment   []Compliance - no shows, cancellations or lapse in therapy   []Verification - order discrepancy   [x]Controlled medication   []Medication not included in policy   []90-day supply request   [x]Other - last note inclomplete

## 2021-12-31 NOTE — PROGRESS NOTES
Collaborative Care Psychiatry Service ([facility]PS)  Robel 3, 2022    Behavioral Health Clinician Progress Note    Patient Name: Gay Cummins      Telemedicine Visit: The patient's condition can be safely assessed and treated via synchronous audio and visual telemedicine encounter.      Reason for Telemedicine Visit: Services only offered telehealth    Originating Site (Patient Location): Patient's home    Distant Site (Provider Location): Provider Remote Setting- Home Office    Consent:  The patient/guardian has verbally consented to: the potential risks and benefits of telemedicine (video visit) versus in person care; bill my insurance or make self-payment for services provided; and responsibility for payment of non-covered services.     Mode of Communication:  Video Conference via [employer]tex    As the provider I attest to compliance with applicable laws and regulations related to telemedicine.         Service Type:  Individual      Service Location:   [facility] / Email (patient reached)     Session Start Time: 1015am  Session End Time: 1031am      Session Length: 16 - 37      Attendees: Patient    Visit Activities (Refresh list every visit): [facility] Only    Diagnostic Assessment Date: 03/11/2021  See Flowsheets for today's PHQ-9 and SANDEEP-7 results  Previous PHQ-9:   PHQ-9 SCORE 6/23/2021 7/26/2021 8/25/2021   [facility] (Mild depression) - 8 (Mild depression)   PHQ-9 Total Score 5 9 8       Previous SANDEEP-7:   SANDEEP-7 SCORE 7/26/2021 8/25/2021 1/3/2022   Total Score - 10 (moderate anxiety) -   Total Score 8 10 0       WHODAS  WHODAS 2.0 Total Score 7/26/2021   Total Score 19        CAGE  No flowsheet data found.      DATA  Extended Session (60+ minutes): No  Interactive Complexity: No  Crisis: No    Medication Compliance:  Yes      Chemical Use Review:   Substance Use: Chemical use reviewed, no active concerns identified      Tobacco Use: No current tobacco use.      Current Stressors / Issues:  MH update:  Improved since November when she started the ritalin LA and it is working better. Anxiety is also improved. In the afternoon she notices that concentration decreases. Unsure if this med related or normal fatigue at the end of the day.  Stresses: Covid exposure of children but tested negative  Appetite: unremarkable  Sleep: unremarkable   Therapy: Had seen Irma Obrien to get tested for ADHD. Sees a therapist PRN.   JEAN: Occassionally will have a glass of wine  Preg: No  Interventions: supportive therapy, psychoeducation on relationship between anxiety and ADHD  Most important: dosing of ritalin LA, decrease celexa?       Progress on Treatment Objective(s) / Homework:  Satisfactory progress - MAINTENANCE (Working to maintain change, with risk of relapse); Intervened by continuing to positively reinforce healthy behavior choice     Motivational Interviewing    MI Intervention: Co-Developed Goal: maintain progress, Expressed Empathy/Understanding, Supported Autonomy, Collaboration, Evocation, Permission to raise concern or advise, Open-ended questions, Reflections: simple and complex, Change talk (evoked) and Reframe     Change Talk Expressed by the Patient: Desire to change Ability to change Reasons to change Need to change Committment to change Activation Taking steps    Provider Response to Change Talk: E - Evoked more info from patient about behavior change, A - Affirmed patient's thoughts, decisions, or attempts at behavior change, R - Reflected patient's change talk and S - Summarized patient's change talk statements        Review of Symptoms per patient report:  Depression: No symptoms  Jo Ann:  No Symptoms  Psychosis: No Symptoms  Anxiety: No Symptoms  Panic:  No symptoms  Post Traumatic Stress Disorder:  No Symptoms   Eating Disorder: hx of treatment in high school  ADD / ADHD:  No symptoms  Conduct Disorder: No symptoms  Autism Spectrum Disorder: No symptoms  Obsessive Compulsive Disorder: No  Symptoms      Changes in Health Issues:   None reported    Assessment: Current Emotional / Mental Status (status of significant symptoms):  Risk status (Self / Other harm or suicidal ideation)  Patient denies a history of suicidal ideation, suicide attempts, self-injurious behavior, homicidal ideation, homicidal behavior and and other safety concerns  Patient denies current fears or concerns for personal safety.  Patient denies current or recent suicidal ideation or behaviors.  Patient denies current or recent homicidal ideation or behaviors.  Patient denies current or recent self injurious behavior or ideation.  Patient denies other safety concerns.  A safety and risk management plan has not been developed at this time, however patient was encouraged to call [place] [phone] should there be a change in any of these risk factors.    Appearance:   Appropriate   Eye Contact:   Good   Psychomotor Behavior: Normal   Attitude:   Cooperative   Orientation:   All  Speech   Rate / Production: Normal    Volume:  Normal   Mood:    Normal  Affect:    Appropriate   Thought Content:  Clear   Thought Form:  Coherent  Logical   Insight:    Good     Diagnoses:  1. Attention deficit hyperactivity disorder (ADHD), predominantly inattentive type        Collateral Reports Completed:  Communicated with Luciana Orlando, MSN, APRN, CNP,[facility]    Plan: (Homework, other):  Patient was given information about behavioral services and encouraged to schedule a follow up appointment with the clinic [facility] in conjunction with next [facility] appointment.  She was also given information about mental health symptoms and treatment options .  CD Recommendations: No indications of CD issues.  Claire [facility]      TRISTA Olivares  Robel 3, 2022

## 2022-01-03 ENCOUNTER — VIRTUAL VISIT (OUTPATIENT)
Dept: PSYCHIATRY | Facility: CLINIC | Age: 37
End: 2022-01-03
Payer: COMMERCIAL

## 2022-01-03 ENCOUNTER — VIRTUAL VISIT (OUTPATIENT)
Dept: BEHAVIORAL HEALTH | Facility: CLINIC | Age: 37
End: 2022-01-03
Payer: COMMERCIAL

## 2022-01-03 DIAGNOSIS — F90.0 ADHD (ATTENTION DEFICIT HYPERACTIVITY DISORDER), INATTENTIVE TYPE: ICD-10-CM

## 2022-01-03 DIAGNOSIS — F90.0 ATTENTION DEFICIT HYPERACTIVITY DISORDER (ADHD), PREDOMINANTLY INATTENTIVE TYPE: Primary | ICD-10-CM

## 2022-01-03 DIAGNOSIS — F41.1 GAD (GENERALIZED ANXIETY DISORDER): ICD-10-CM

## 2022-01-03 DIAGNOSIS — F33.0 MILD RECURRENT MAJOR DEPRESSION (H): Primary | ICD-10-CM

## 2022-01-03 PROCEDURE — 99207 PR CDG-CODE CATEGORY CHANGED: CPT | Performed by: SOCIAL WORKER

## 2022-01-03 PROCEDURE — 90832 PSYTX W PT 30 MINUTES: CPT | Mod: GT | Performed by: SOCIAL WORKER

## 2022-01-03 PROCEDURE — 99214 OFFICE O/P EST MOD 30 MIN: CPT | Mod: GT | Performed by: NURSE PRACTITIONER

## 2022-01-03 RX ORDER — METHYLPHENIDATE HYDROCHLORIDE 30 MG/1
30 CAPSULE, EXTENDED RELEASE ORAL EVERY MORNING
Qty: 30 CAPSULE | Refills: 0 | Status: SHIPPED | OUTPATIENT
Start: 2022-01-03 | End: 2022-02-07

## 2022-01-03 RX ORDER — CITALOPRAM HYDROBROMIDE 40 MG/1
40 TABLET ORAL DAILY
Qty: 90 TABLET | Refills: 0 | Status: SHIPPED | OUTPATIENT
Start: 2022-01-03

## 2022-01-03 ASSESSMENT — ANXIETY QUESTIONNAIRES
6. BECOMING EASILY ANNOYED OR IRRITABLE: NOT AT ALL
GAD7 TOTAL SCORE: 0
5. BEING SO RESTLESS THAT IT IS HARD TO SIT STILL: NOT AT ALL
IF YOU CHECKED OFF ANY PROBLEMS ON THIS QUESTIONNAIRE, HOW DIFFICULT HAVE THESE PROBLEMS MADE IT FOR YOU TO DO YOUR WORK, TAKE CARE OF THINGS AT HOME, OR GET ALONG WITH OTHER PEOPLE: NOT DIFFICULT AT ALL
1. FEELING NERVOUS, ANXIOUS, OR ON EDGE: NOT AT ALL
7. FEELING AFRAID AS IF SOMETHING AWFUL MIGHT HAPPEN: NOT AT ALL
2. NOT BEING ABLE TO STOP OR CONTROL WORRYING: NOT AT ALL
3. WORRYING TOO MUCH ABOUT DIFFERENT THINGS: NOT AT ALL

## 2022-01-03 ASSESSMENT — PATIENT HEALTH QUESTIONNAIRE - PHQ9: 5. POOR APPETITE OR OVEREATING: NOT AT ALL

## 2022-01-03 NOTE — PATIENT INSTRUCTIONS
1.  Increase Ritalin LA to 30 mg daily    2.  Continue citalopram 40 mg daily        Continue all other medications as reviewed per electronic medical record today.     Safety plan reviewed. To the Emergency Department as needed or call after hours crisis line at [phone] or [phone]. [place] Crisis Text Line. Text [place] to 563448 or Suicide LifeLine Chat: suicideCaliber Infosolutions.org/chat/    To schedule individual or family therapy, call [facility] at [phone].    Schedule an appointment with me in February or sooner as needed. Call [facility] at [phone] to schedule.    Follow up with primary care provider as planned or for acute medical concerns.    Call the psychiatric nurse line with medication questions or concerns at [phone].    H[employer]Grip may be used to communicate with your provider, but this is not intended to be used for emergencies.    Crisis Resources:    National Suicide Prevention Lifeline: 680.830.3169 (TTY: 802.505.6915). Call anytime for help.  (www.suicidepreventionlifeline.org)  National [place] on Mental Illness (www.constanza.org): 962.565.7665 or 356-392-1324.   Mental Health Association (www.mentalhealth.org): 108.266.2988 or 933-944-1809.  [place] Crisis Text Line: Text [place] to 045149  Suicide LifeLine Chat: suicideCaliber Infosolutions.org/chat

## 2022-01-03 NOTE — PROGRESS NOTES
"Gay is a 36 year old who is being evaluated via a billable video visit.      How would you like to obtain your AVS? MyChart  If the video visit is dropped, the invitation should be resent by: Text to cell phone: [phone]  Will anyone else be joining your video visit? No      Video Start Time: 10:55 AM  Video-Visit Details    Type of service:  Video Visit    Video End Time:11:07 AM    Originating Location (pt. Location): Home    Distant Location (provider location):  [facility] MENTAL [facility] & ADDICTION [facility]     Platform used for Video Visit: [facility]         Outpatient Psychiatric Progress Note    Name: Gay Cummins   : 1985                    Primary Care Provider: TED Grace CNP   Therapist: None    PHQ-9 scores:  PHQ-9 SCORE 2021   PHQ-9 Total Score MyChart 5 (Mild depression) - 8 (Mild depression)   PHQ-9 Total Score 5 9 8       SANDEEP-7 scores:  SANDEEP-7 SCORE 2021 2021 1/3/2022   Total Score - 10 (moderate anxiety) -   Total Score 8 10 0       Patient Identification:    Patient is a 36 year old year old,   White Other female  who presents for return visit with me.  Patient is currently employed part time. Patient attended the session alone. Patient prefers to be called: \" Gay\".    Current medications include: adapalene (DIFFERIN) 0.1 % external gel, Apply topically At Bedtime  citalopram (CELEXA) 40 MG tablet, TAKE 1 TABLET BY MOUTH EVERY DAY  LORazepam (ATIVAN) 0.5 MG tablet, Take 1 tablet (0.5 mg) by mouth every 6 hours as needed for anxiety  methylphenidate (RITALIN LA) 20 MG 24 hr capsule, Take 20 mg by mouth daily    No current facility-administered medications on file prior to visit.       The [place] Prescription Monitoring Program has been reviewed and there are no concerns about diversionary activity for controlled substances at this time.      I was able to review most recent Primary Care Provider, " specialty provider, and therapy visit notes that I have access to.     Today, patient reports that since taking the  Ritalin she can f ocus on one thing at a time.  No side effects reported.  Able to fall asleep and stay asleep.  Since anxiety has gone down, she is wondering how to taper off of it.  No depression.  No reports od being short tempered.   has noticed changes in her demeanor.  She gets less motivatd and more spacy towards the endo fthe day     has a past medical history of Anxiety and Depression.She has no past medical history of Chronic infection, History of blood transfusion, Malignant hyperthermia, or Sleep apnea.    Social history updates:    No changes in Gay's social history reported today    Substance use updates:    Some alcohol use reported  Tobacco use: No    Vital Signs:   There were no vitals taken for this visit.    Labs:    Most recent laboratory results reviewed and no new labs.     Mental Status Examination:  Appearance:  awake, alert and casually dressed  Attitude:  cooperative   Eye Contact:  adequate  Gait and Station: No assistive Devices used and No dizziness or falls  Psychomotor Behavior:  intact station, gait and muscle tone  Oriented to:  time, person, and place  Attention Span and Concentration:  Normal  Speech:   clear, coherent and Speaks: English  Mood:  anxious, depressed and better  Affect:  appropriate and in normal range  Associations:  no loose associations  Thought Process:  goal oriented  Thought Content:  no evidence of suicidal ideation or homicidal ideation, no auditory hallucinations present and no visual hallucinations present  Recent and Remote Memory:  intact Not formally assessed. No amnesia.  Fund of Knowledge: appropriate  Insight:  good  Judgment:  intact  Impulse Control:  intact    Suicide Risk Assessment:  Today Gay Cummins reports that she is having no thoughts to want to end her life or to harm other people. In addition, there are notable  risk factors for self-harm, including anxiety and mood change. However, risk is mitigated by commitment to family, history of seeking help when needed, future oriented, denies suicidal intent or plan and denies homicidal ideation, intent, or plan. Therefore, based on all available evidence including the factors cited above, Gay Cummins does not appear to be at imminent risk for self-harm, does not meet criteria for a 72-hr hold, and therefore remains appropriate for ongoing outpatient level of care.  A thorough assessment of risk factors related to suicide and self-harm have been reviewed and are noted above. The patient convincingly denies suicidality on several occasions. Local community safety resources printed and reviewed for patient to use if needed. There was no deceit detected, and the patient presented in a manner that was believable.     DSM5 Diagnosis:  Attention-Deficit/Hyperactivity Disorder  314.00 (F90.0) Predominantly inattentive presentation  296.31 (F33.0) Major Depressive Disorder, Recurrent Episode, Mild _ and With anxious distress  300.02 (F41.1) Generalized Anxiety Disorder    Medical comorbidities include:   Patient Active Problem List    Diagnosis Date Noted     Delivery of pregnancy by  section 2020     Priority: Medium     Encounter for triage in pregnant patient 2020     Priority: Medium     History of  delivery 2019     Priority: Medium     Moderate episode of recurrent major depressive disorder (H) 2017     Priority: Medium     SANDEEP (generalized anxiety disorder) 2017     Priority: Medium     Sacroiliac joint pain 2017     Priority: Medium       Assessment:    Gay Cummins reports that she is feeling more stable with her medications but finds that occasionally she has difficulties sustaining attention as the day goes on.  She needs this attention and focus to function at her work and at home.  I increased the Ritalin long-acting  to 30 mg daily.  To stabilize her depression and anxiety, she will continue with citalopram 40 mg daily.    Medication side effects and alternatives were reviewed. Health promotion activities recommended and reviewed today. All questions addressed. Education and counseling completed regarding risks and benefits of medications and psychotherapy options.    Treatment Plan:        1.  Increase Ritalin LA to 30 mg daily    2.  Continue citalopram 40 mg daily        Continue all other medications as reviewed per electronic medical record today.     Safety plan reviewed. To the Emergency Department as needed or call after hours crisis line at [phone] or [phone]. [place] Crisis Text [place]. Text [place] to 690342 or Suicide LifeLine Chat: [employer]org/chat/    To schedule individual or family therapy, call [facility] at [phone].    Schedule an appointment with me in February or sooner as needed. Call [facility] at [phone] to schedule.    Follow up with primary care provider as planned or for acute medical concerns.    Call the psychiatric nurse line with medication questions or concerns at [phone].    Drywave may be used to communicate with your provider, but this is not intended to be used for emergencies.    Crisis Resources:    National Suicide Prevention Lifeline: 327.832.9161 (TTY: 718.749.6412). Call anytime for help.  (www.suicidepreventionlifeline.org)  National [place] on Mental Illness (www.constanza.org): 740.847.7859 or 784-075-1077.   Mental Health Association (www.mentalhealth.org): 524.481.1112 or 000-391-3740.  [place] Crisis Text [place]: Text [place] to 824204  Suicide LifeLine Chat: [employer]org/chat    Administrative Billing:   Time spent with patient was 30 minutes and greater than 50% of time or 20 minutes was spent in counseling and coordination of care regarding above diagnoses and treatment plan.    Patient  Status:  Patient will continue to be seen for ongoing consultation and stabilization.    Signed:   JONNA SchmidtP-BC   Psychiatry

## 2022-01-04 ASSESSMENT — ANXIETY QUESTIONNAIRES: GAD7 TOTAL SCORE: 0

## 2022-01-05 NOTE — PATIENT INSTRUCTIONS
1.    Ritalin LA 20 mg daily    2.  Continue citalopram 40 mg daily    3.  Consider adding gabapentin or buspirone 2-3 times daily for your anxiety symptoms    4.  Talk therapy to manage life stressors when able to       Continue all other medications as reviewed per electronic medical record today.     Safety plan reviewed. To the Emergency Department as needed or call after hours crisis line at [phone] or [phone]. [place] Crisis Text [place]. Text [place] to 859161 or Suicide LifeLine Chat: [employer]org/chat/    To schedule individual or family therapy, call [facility] at [phone].    Schedule an appointment with me in 2 months or sooner as needed. Call [facility] at [phone] to schedule.    Follow up with primary care provider as planned or for acute medical concerns.    Call the psychiatric nurse line with medication questions or concerns at [phone].    Safer Minicabshart may be used to communicate with your provider, but this is not intended to be used for emergencies.    Crisis Resources:    National Suicide Prevention Lifeline: 213.704.7457 (TTY: 900.422.7776). Call anytime for help.  (www.suicidepreventionlifeline.org)  National [place] on Mental Illness (www.constanza.org): 130.541.8823 or 700-388-1320.   Mental Health Association (www.mentalhealth.org): 396.172.1381 or 695-333-3364.  [place] Crisis Text [place]: Text [place] to 920699  Suicide LifeLine Chat: [employer]Invisible [employer].org/chat

## 2022-01-21 NOTE — PROGRESS NOTES
Data: Patient assessed in the [facility] for pelvic pain.  Cervical exam closed.  Membranes intact.  Contractions none.  Action:  Presumed adequate fetal oxygenation documented (see flow record). Discharge instructions reviewed.  Patient instructed to report change in fetal movement, vaginal leaking of fluid or bleeding, abdominal pain, or any concerns related to the pregnancy to her nurse/physician.    Response: Orders to discharge home per Chela Maria.  Patient verbalized understanding of education and verbalized agreement with plan. Discharged to home at 1430.     
Expected Date Of Service: 01/07/2022
Billing Type: Third-Party Bill
Bill For Surgical Tray: no

## 2022-02-07 ENCOUNTER — MYC REFILL (OUTPATIENT)
Dept: PSYCHIATRY | Facility: CLINIC | Age: 37
End: 2022-02-07

## 2022-02-07 ENCOUNTER — OFFICE VISIT (OUTPATIENT)
Dept: PEDIATRICS | Facility: CLINIC | Age: 37
End: 2022-02-07
Payer: COMMERCIAL

## 2022-02-07 VITALS
HEIGHT: 67 IN | SYSTOLIC BLOOD PRESSURE: 114 MMHG | HEART RATE: 72 BPM | TEMPERATURE: 97.6 F | BODY MASS INDEX: 28.82 KG/M2 | WEIGHT: 183.6 LBS | DIASTOLIC BLOOD PRESSURE: 68 MMHG | RESPIRATION RATE: 16 BRPM

## 2022-02-07 DIAGNOSIS — Z00.00 ROUTINE GENERAL MEDICAL EXAMINATION AT A HEALTH CARE FACILITY: Primary | ICD-10-CM

## 2022-02-07 DIAGNOSIS — E66.3 OVERWEIGHT: ICD-10-CM

## 2022-02-07 DIAGNOSIS — F33.1 MODERATE EPISODE OF RECURRENT MAJOR DEPRESSIVE DISORDER (H): ICD-10-CM

## 2022-02-07 DIAGNOSIS — F41.1 GAD (GENERALIZED ANXIETY DISORDER): ICD-10-CM

## 2022-02-07 DIAGNOSIS — F90.0 ADHD (ATTENTION DEFICIT HYPERACTIVITY DISORDER), INATTENTIVE TYPE: ICD-10-CM

## 2022-02-07 DIAGNOSIS — Z11.59 NEED FOR HEPATITIS C SCREENING TEST: ICD-10-CM

## 2022-02-07 PROBLEM — Z36.89 ENCOUNTER FOR TRIAGE IN PREGNANT PATIENT: Status: RESOLVED | Noted: 2020-04-23 | Resolved: 2022-02-07

## 2022-02-07 PROBLEM — Z98.891 HISTORY OF CESAREAN DELIVERY: Status: RESOLVED | Noted: 2019-11-01 | Resolved: 2022-02-07

## 2022-02-07 LAB
BASOPHILS # BLD AUTO: 0 10E3/UL (ref 0–0.2)
BASOPHILS NFR BLD AUTO: 0 %
DEPRECATED CALCIDIOL+CALCIFEROL SERPL-MC: 30 UG/L (ref 20–75)
EOSINOPHIL # BLD AUTO: 0.1 10E3/UL (ref 0–0.7)
EOSINOPHIL NFR BLD AUTO: 1 %
ERYTHROCYTE [DISTWIDTH] IN BLOOD BY AUTOMATED COUNT: 12.2 % (ref 10–15)
HCT VFR BLD AUTO: 41.9 % (ref 35–47)
HGB BLD-MCNC: 14 G/DL (ref 11.7–15.7)
LYMPHOCYTES # BLD AUTO: 2.3 10E3/UL (ref 0.8–5.3)
LYMPHOCYTES NFR BLD AUTO: 24 %
MCH RBC QN AUTO: 32.1 PG (ref 26.5–33)
MCHC RBC AUTO-ENTMCNC: 33.4 G/DL (ref 31.5–36.5)
MCV RBC AUTO: 96 FL (ref 78–100)
MONOCYTES # BLD AUTO: 0.5 10E3/UL (ref 0–1.3)
MONOCYTES NFR BLD AUTO: 5 %
NEUTROPHILS # BLD AUTO: 6.4 10E3/UL (ref 1.6–8.3)
NEUTROPHILS NFR BLD AUTO: 70 %
PLATELET # BLD AUTO: 291 10E3/UL (ref 150–450)
RBC # BLD AUTO: 4.36 10E6/UL (ref 3.8–5.2)
WBC # BLD AUTO: 9.3 10E3/UL (ref 4–11)

## 2022-02-07 PROCEDURE — 36415 COLL VENOUS BLD VENIPUNCTURE: CPT | Performed by: NURSE PRACTITIONER

## 2022-02-07 PROCEDURE — 80061 LIPID PANEL: CPT | Performed by: NURSE PRACTITIONER

## 2022-02-07 PROCEDURE — 80050 GENERAL HEALTH PANEL: CPT | Performed by: NURSE PRACTITIONER

## 2022-02-07 PROCEDURE — 82306 VITAMIN D 25 HYDROXY: CPT | Performed by: NURSE PRACTITIONER

## 2022-02-07 PROCEDURE — 86803 HEPATITIS C AB TEST: CPT | Performed by: NURSE PRACTITIONER

## 2022-02-07 PROCEDURE — 99395 PREV VISIT EST AGE 18-39: CPT | Performed by: NURSE PRACTITIONER

## 2022-02-07 SDOH — HEALTH STABILITY: PHYSICAL HEALTH: ON AVERAGE, HOW MANY MINUTES DO YOU ENGAGE IN EXERCISE AT THIS LEVEL?: 0 MIN

## 2022-02-07 SDOH — ECONOMIC STABILITY: FOOD INSECURITY: WITHIN THE PAST 12 MONTHS, THE FOOD YOU BOUGHT JUST DIDN'T LAST AND YOU DIDN'T HAVE MONEY TO GET MORE.: NEVER TRUE

## 2022-02-07 SDOH — ECONOMIC STABILITY: INCOME INSECURITY: HOW HARD IS IT FOR YOU TO PAY FOR THE VERY BASICS LIKE FOOD, HOUSING, MEDICAL CARE, AND HEATING?: NOT HARD AT ALL

## 2022-02-07 SDOH — ECONOMIC STABILITY: INCOME INSECURITY: IN THE LAST 12 MONTHS, WAS THERE A TIME WHEN YOU WERE NOT ABLE TO PAY THE MORTGAGE OR RENT ON TIME?: NO

## 2022-02-07 SDOH — ECONOMIC STABILITY: FOOD INSECURITY: WITHIN THE PAST 12 MONTHS, YOU WORRIED THAT YOUR FOOD WOULD RUN OUT BEFORE YOU GOT MONEY TO BUY MORE.: NEVER TRUE

## 2022-02-07 SDOH — HEALTH STABILITY: PHYSICAL HEALTH: ON AVERAGE, HOW MANY DAYS PER WEEK DO YOU ENGAGE IN MODERATE TO STRENUOUS EXERCISE (LIKE A BRISK WALK)?: 0 DAYS

## 2022-02-07 ASSESSMENT — ENCOUNTER SYMPTOMS
ABDOMINAL PAIN: 0
DIARRHEA: 0
NAUSEA: 0
HEMATURIA: 0
HEMATOCHEZIA: 0
EYE PAIN: 0
PALPITATIONS: 0
CONSTIPATION: 0
MYALGIAS: 0
SORE THROAT: 0
FEVER: 0
BREAST MASS: 0
ARTHRALGIAS: 0
COUGH: 0
HEADACHES: 1
PARESTHESIAS: 0
CHILLS: 0
FREQUENCY: 0
WEAKNESS: 0
JOINT SWELLING: 0
DYSURIA: 0
DIZZINESS: 0
SHORTNESS OF BREATH: 0
NERVOUS/ANXIOUS: 0
HEARTBURN: 0

## 2022-02-07 ASSESSMENT — LIFESTYLE VARIABLES
HOW MANY STANDARD DRINKS CONTAINING ALCOHOL DO YOU HAVE ON A TYPICAL DAY: 1 OR 2
HOW OFTEN DO YOU HAVE A DRINK CONTAINING ALCOHOL: 4 OR MORE TIMES A WEEK
HOW OFTEN DO YOU HAVE SIX OR MORE DRINKS ON ONE OCCASION: LESS THAN MONTHLY

## 2022-02-07 ASSESSMENT — SOCIAL DETERMINANTS OF HEALTH (SDOH)
IN A TYPICAL WEEK, HOW MANY TIMES DO YOU TALK ON THE PHONE WITH FAMILY, FRIENDS, OR NEIGHBORS?: MORE THAN THREE TIMES A WEEK
HOW OFTEN DO YOU ATTEND CHURCH OR RELIGIOUS SERVICES?: NEVER
DO YOU BELONG TO ANY CLUBS OR ORGANIZATIONS SUCH AS CHURCH GROUPS UNIONS, FRATERNAL OR ATHLETIC GROUPS, OR SCHOOL GROUPS?: NO
HOW OFTEN DO YOU GET TOGETHER WITH FRIENDS OR RELATIVES?: THREE TIMES A WEEK

## 2022-02-07 ASSESSMENT — MIFFLIN-ST. JEOR: SCORE: 1556.46

## 2022-02-07 NOTE — PROGRESS NOTES
SUBJECTIVE:   CC: Gay Cummins is an 36 year old woman who presents for preventive health visit.       Patient has been advised of split billing requirements and indicates understanding: Yes  Healthy Habits:     Getting at least 3 servings of Calcium per day:  Yes    Bi-annual eye exam:  Yes    Dental care twice a year:  Yes    Sleep apnea or symptoms of sleep apnea:  None    Diet:  Regular (no restrictions)    Frequency of exercise:  None    Taking medications regularly:  No    Barriers to taking medications:  None    Medication side effects:  None    PHQ-2 Total Score: 0    Additional concerns today:  No    History of depression and anxiety, is on celexa andis seeing psychiatry, notes she was diagnosed with ADHD and started ritalin and her anxiety symptoms have improved. She has been tweeking dosing of celexa and ritalin.     PHQ 6/23/2021 7/26/2021 8/25/2021   PHQ-9 Total Score 5 9 8   Q9: Thoughts of better off dead/self-harm past 2 weeks Not at all Not at all Not at all     SANDEEP-7 SCORE 7/26/2021 8/25/2021 1/3/2022   Total Score - 10 (moderate anxiety) -   Total Score 8 10 0     Has had tubes removed with csection.    Today's PHQ-2 Score:   PHQ-2 ( 1999 Pf[employer]) 2/7/2022   Q1: Little interest or pleasure in doing things 0   Q2: Feeling down, depressed or hopeless 0   PHQ-2 Score 0   PHQ-2 Total Score (12-17 Years)- Positive if 3 or more points; Administer PHQ-A if positive -   Q1: Little interest or pleasure in doing things Not at all   Q2: Feeling down, depressed or hopeless Not at all   PHQ-2 Score 0       Abuse: Current or Past (Physical, Sexual or Emotional) - No  Do you feel safe in your environment? Yes    Have you ever done Advance Care Planning? (For example, a Health Directive, POLST, or a discussion with a medical provider or your loved ones about your wishes):     Social History     Tobacco Use     Smoking status: Never Smoker     Smokeless tobacco: Never Used   Substance Use Topics     Alcohol  use: Yes     Alcohol/week: 5.0 standard drinks     Types: 5 Standard drinks or equivalent per week         Alcohol Use 2/7/2022   Prescreen: >3 drinks/day or >7 drinks/week? No   Prescreen: >3 drinks/day or >7 drinks/week? -       Reviewed orders with patient.  Reviewed health maintenance and updated orders accordingly - Yes  Lab work is in process    Breast Cancer Screening:    FHS-7:   Breast CA Risk Assessment (FHS-7) 2/7/2022   Did any of your first-degree relatives have breast or ovarian cancer? No   Did any of your relatives have bilateral breast cancer? No   Did any man in your family have breast cancer? No   Did any woman in your family have breast and ovarian cancer? Yes   Did any woman in your family have breast cancer before age 50 y? No   Do you have 2 or more relatives with breast and/or ovarian cancer? No   Do you have 2 or more relatives with breast and/or bowel cancer? No       Patient under 40 years of age: Routine Mammogram Screening not recommended.   Pertinent mammograms are reviewed under the imaging tab.    History of abnormal Pap smear: NO - age 30-65 PAP every 5 years with negative HPV co-testing recommended  PAP / HPV Latest Ref Rng & Units 10/8/2018   PAP (Historical) - NIL   HPV16 NEG:Negative Negative   HPV18 NEG:Negative Negative   HRHPV NEG:Negative Negative     Reviewed and updated as needed this visit by clinical staff  Tobacco  Allergies  Meds   Med Hx  Surg Hx  Fam Hx  Soc Hx       Reviewed and updated as needed this visit by Provider    Meds                Review of Systems   Constitutional: Negative for chills and fever.   HENT: Negative for congestion, ear pain, hearing loss and sore throat.    Eyes: Positive for visual disturbance. Negative for pain.   Respiratory: Negative for cough and shortness of breath.    Cardiovascular: Negative for chest pain, palpitations and peripheral edema.   Gastrointestinal: Negative for abdominal pain, constipation, diarrhea, heartburn,  "hematochezia and nausea.   Breasts:  Negative for tenderness, breast mass and discharge.   Genitourinary: Negative for dysuria, frequency, genital sores, hematuria, pelvic pain, urgency, vaginal bleeding and vaginal discharge.   Musculoskeletal: Negative for arthralgias, joint swelling and myalgias.   Skin: Negative for rash.   Neurological: Positive for headaches. Negative for dizziness, weakness and paresthesias.   Psychiatric/Behavioral: Negative for mood changes. The patient is not nervous/anxious.         OBJECTIVE:   /68 (BP Location: Right arm, Patient Position: Sitting, Cuff Size: Adult Regular)   Pulse 72   Temp 97.6  F (36.4  C) (Tympanic)   Resp 16   Ht 1.695 m (5' 6.75\")   Wt 83.3 kg (183 lb 9.6 oz)   LMP 01/19/2022 (Approximate)   BMI 28.97 kg/m    Physical Exam  GENERAL: healthy, alert and no distress  EYES: Eyes grossly normal to inspection, PERRL and conjunctivae and sclerae normal  HENT: ear canals and TM's normal, nose and mouth without ulcers or lesions  NECK: no adenopathy, no asymmetry, masses, or scars and thyroid normal to palpation  RESP: lungs clear to auscultation - no rales, rhonchi or wheezes  CV: regular rate and rhythm, normal S1 S2, no S3 or S4, no murmur, click or rub, no peripheral edema and peripheral pulses strong  MS: no gross musculoskeletal defects noted, no edema  PSYCH: mentation appears normal, affect normal/bright      ASSESSMENT/PLAN:   (Z00.00) Routine general medical examination at a health care facility  (primary encounter diagnosis)  Comment:   Plan: Lipid panel reflex to direct LDL Non-fasting,         Adult Dermatology Referral          (Z11.59) Need for hepatitis C screening test  Comment:   Plan: Hepatitis C Screen Reflex to HCV RNA Quant and         Genotype          (F33.1) Moderate episode of recurrent major depressive disorder (H)  Comment: stable on current regime, no problems  Plan:     (F41.1) SANDEEP (generalized anxiety disorder)  Comment: " "stable  Plan:     (E66.3) Overweight  Comment: she would like to focus on diet. Has a son with multiple food allergies. She has three kinds under 4 (including twins), finds it difficult to focus on her own diet and exerise. Feels tired. Will draw labs, discussed perhaps seeing nutrition (check ith insurance), doing food journal and tracking.   Plan: Nutrition Referral, TSH with free T4 reflex,         CBC with platelets and differential, Vitamin D         Deficiency, Comprehensive metabolic panel (BMP         + Alb, Alk Phos, ALT, AST, Total. Bili, TP)          COUNSELING:  Reviewed preventive health counseling, as reflected in patient instructions  Special attention given to:        Regular exercise       Healthy diet/nutrition       Contraception       Family planning       Osteoporosis prevention/bone health    Estimated body mass index is 28.97 kg/m  as calculated from the following:    Height as of this encounter: 1.695 m (5' 6.75\").    Weight as of this encounter: 83.3 kg (183 lb 9.6 oz).    Weight management plan: Discussed healthy diet and exercise guidelines    She reports that she has never smoked. She has never used smokeless tobacco.      Counseling Resources:  ATP IV Guidelines  Pooled Cohorts Equation Calculator  Breast Cancer Risk Calculator  BRCA-Related Cancer Risk Assessment: FHS-7 Tool  FRAX Risk Assessment  ICSI Preventive Guidelines  Dietary Guidelines for Americans, 2010  USDA's MyPlate  ASA Prophylaxis  Lung CA Screening    Lakshmi Jade, TED HERNANDEZ  [facility] MAK  "

## 2022-02-07 NOTE — PATIENT INSTRUCTIONS
If you ever want to transfer prescribing of your ritalin to me. In order to do this, schedule an establish medication prescribing appointment (this can be virtual).     I have placed a referral for nutrition. We will also do some labs today. Consider a food journal.     I have placed a referral for skin check.     Preventive Health Recommendations  Female Ages 26 - 39  Yearly exam:   See your health care provider every year in order to    Review health changes.     Discuss preventive care.      Review your medicines if you your doctor has prescribed any.    Until age 30: Get a Pap test every three years (more often if you have had an abnormal result).    After age 30: Talk to your doctor about whether you should have a Pap test every 3 years or have a Pap test with HPV screening every 5 years.   You do not need a Pap test if your uterus was removed (hysterectomy) and you have not had cancer.  You should be tested each year for STDs (sexually transmitted diseases), if you're at risk.   Talk to your provider about how often to have your cholesterol checked.  If you are at risk for diabetes, you should have a diabetes test (fasting glucose).  Shots: Get a flu shot each year. Get a tetanus shot every 10 years.   Nutrition:     Eat at least 5 servings of fruits and vegetables each day.    Eat whole-grain bread, whole-wheat pasta and brown rice instead of white grains and rice.    Get adequate Calcium and Vitamin D.     Lifestyle    Exercise at least 150 minutes a week (30 minutes a day, 5 days of the week). This will help you control your weight and prevent disease.    Limit alcohol to one drink per day.    No smoking.     Wear sunscreen to prevent skin cancer.    See your dentist every six months for an exam and cleaning.

## 2022-02-08 LAB
ALBUMIN SERPL-MCNC: 4.1 G/DL (ref 3.4–5)
ALP SERPL-CCNC: 56 U/L (ref 40–150)
ALT SERPL W P-5'-P-CCNC: 19 U/L (ref 0–50)
ANION GAP SERPL CALCULATED.3IONS-SCNC: 5 MMOL/L (ref 3–14)
AST SERPL W P-5'-P-CCNC: 10 U/L (ref 0–45)
BILIRUB SERPL-MCNC: 0.6 MG/DL (ref 0.2–1.3)
BUN SERPL-MCNC: 11 MG/DL (ref 7–30)
CALCIUM SERPL-MCNC: 9.1 MG/DL (ref 8.5–10.1)
CHLORIDE BLD-SCNC: 107 MMOL/L (ref 94–109)
CHOLEST SERPL-MCNC: 153 MG/DL
CO2 SERPL-SCNC: 25 MMOL/L (ref 20–32)
CREAT SERPL-MCNC: 0.67 MG/DL (ref 0.52–1.04)
FASTING STATUS PATIENT QL REPORTED: NORMAL
GFR SERPL CREATININE-BSD FRML MDRD: >90 ML/MIN/1.73M2
GLUCOSE BLD-MCNC: 88 MG/DL (ref 70–99)
HCV AB SERPL QL IA: NONREACTIVE
HDLC SERPL-MCNC: 66 MG/DL
LDLC SERPL CALC-MCNC: 73 MG/DL
NONHDLC SERPL-MCNC: 87 MG/DL
POTASSIUM BLD-SCNC: 4.4 MMOL/L (ref 3.4–5.3)
PROT SERPL-MCNC: 8 G/DL (ref 6.8–8.8)
SODIUM SERPL-SCNC: 137 MMOL/L (ref 133–144)
TRIGL SERPL-MCNC: 72 MG/DL
TSH SERPL DL<=0.005 MIU/L-ACNC: 1.18 MU/L (ref 0.4–4)

## 2022-02-08 NOTE — TELEPHONE ENCOUNTER
Date of Last Office Visit: 10/29/21  Date of Next Office Visit: 3/28/22  No shows since last visit: 0  Cancellations since last visit: 0    Medication requested:methylphenidate (RITALIN LA) 30 MG 24 hr capsule  Date last ordered: 1/3/22 Qty: 30 Refills: 0     [place] of [place] ?: yes  Medication last filled date: 1/3/22 Qty filled: 30  Other [place] substance on [place] ?: yes Lorazepam      If yes, is this a new medication?: NO      Lapse in medication adherence greater than 5 days?: No  Medication refill request verified as identical to current order?: yes  Result of Last DAM, VPA, Li+ Level, CBC, or Carbamazepine Level (at or since last visit):  NA      Last visit treatment plan: 1.    Ritalin LA 20 mg daily    2.  Continue citalopram 40 mg daily    3.  Consider adding gabapentin or buspirone 2-3 times daily for your anxiety symptoms    4.  Talk therapy to manage life stressors when able to       Continue all other medications as reviewed per electronic medical record today.     Safety plan reviewed. To the Emergency Department as needed or call after hours crisis line at [phone] or [phone]. [place] Crisis Text Line. Text [place] to 883039 or Suicide LifeLine Chat: suicidepreventionlifeline.org/chat/    To schedule individual or family therapy, call [facility] at [phone].  Schedule an appointment with me in 2 months or sooner as needed. Call [facility] at [phone] to schedule.    []Medication refilled per  Medication Refill in Ambulatory Care  policy.  [x]Medication unable to be refilled by RN due to criteria not met as indicated below:    []Eligibility - not seen in the last year   []Supervision - no future appointment   []Compliance - no shows, cancellations or lapse in therapy   []Verification - order discrepancy   [x]Controlled medication   [x]Medication not included in policy   []90-day supply request   []Other

## 2022-02-10 ENCOUNTER — MYC MEDICAL ADVICE (OUTPATIENT)
Dept: PSYCHIATRY | Facility: CLINIC | Age: 37
End: 2022-02-10
Payer: COMMERCIAL

## 2022-02-10 DIAGNOSIS — F90.0 ADHD (ATTENTION DEFICIT HYPERACTIVITY DISORDER), INATTENTIVE TYPE: ICD-10-CM

## 2022-02-10 RX ORDER — METHYLPHENIDATE HYDROCHLORIDE 30 MG/1
30 CAPSULE, EXTENDED RELEASE ORAL EVERY MORNING
Qty: 30 CAPSULE | Refills: 0 | Status: SHIPPED | OUTPATIENT
Start: 2022-02-10 | End: 2022-02-11

## 2022-02-10 NOTE — TELEPHONE ENCOUNTER
Gay Cummins Vicky Lynn, NP 37 minutes ago (9:29 AM)         Hi, this message is for Luciana. I had requested a refill of the methylphenidate  LA 30mg through [facility] a few days ago. I'm now out. I tried requesting again and wanted to reach out this way as well to make sure the rx got filled.   Pharmacy is still the same- [facility] [address] in [place].      Thank you!

## 2022-02-11 RX ORDER — METHYLPHENIDATE HYDROCHLORIDE 30 MG/1
30 CAPSULE, EXTENDED RELEASE ORAL EVERY MORNING
Qty: 30 CAPSULE | Refills: 0 | Status: SHIPPED | OUTPATIENT
Start: 2022-02-11 | End: 2022-03-09 | Stop reason: DRUGHIGH

## 2022-03-21 ENCOUNTER — E-VISIT (OUTPATIENT)
Dept: PEDIATRICS | Facility: CLINIC | Age: 37
End: 2022-03-21
Payer: COMMERCIAL

## 2022-03-21 DIAGNOSIS — H65.90 OME (OTITIS MEDIA WITH EFFUSION), UNSPECIFIED LATERALITY: Primary | ICD-10-CM

## 2022-03-21 PROCEDURE — 99421 OL DIG E/M SVC 5-10 MIN: CPT | Performed by: NURSE PRACTITIONER

## 2022-05-04 ENCOUNTER — APPOINTMENT (OUTPATIENT)
Dept: URBAN - METROPOLITAN AREA CLINIC 253 | Age: 37
Setting detail: DERMATOLOGY
End: 2022-05-06

## 2022-05-04 VITALS — WEIGHT: 180 LBS | HEIGHT: 67 IN | RESPIRATION RATE: 15 BRPM

## 2022-05-04 DIAGNOSIS — D22 MELANOCYTIC NEVI: ICD-10-CM

## 2022-05-04 DIAGNOSIS — L82.1 OTHER SEBORRHEIC KERATOSIS: ICD-10-CM

## 2022-05-04 DIAGNOSIS — L81.4 OTHER MELANIN HYPERPIGMENTATION: ICD-10-CM

## 2022-05-04 DIAGNOSIS — B07.8 OTHER VIRAL WARTS: ICD-10-CM

## 2022-05-04 DIAGNOSIS — Z71.89 OTHER SPECIFIED COUNSELING: ICD-10-CM

## 2022-05-04 DIAGNOSIS — D18.0 HEMANGIOMA: ICD-10-CM

## 2022-05-04 PROBLEM — D18.01 HEMANGIOMA OF SKIN AND SUBCUTANEOUS TISSUE: Status: ACTIVE | Noted: 2022-05-04

## 2022-05-04 PROBLEM — D22.5 MELANOCYTIC NEVI OF TRUNK: Status: ACTIVE | Noted: 2022-05-04

## 2022-05-04 PROCEDURE — 17110 DESTRUCT B9 LESION 1-14: CPT

## 2022-05-04 PROCEDURE — OTHER LIQUID NITROGEN: OTHER

## 2022-05-04 PROCEDURE — 99203 OFFICE O/P NEW LOW 30 MIN: CPT | Mod: 25

## 2022-05-04 PROCEDURE — OTHER COUNSELING: OTHER

## 2022-05-04 PROCEDURE — OTHER MIPS QUALITY: OTHER

## 2022-05-04 ASSESSMENT — LOCATION ZONE DERM
LOCATION ZONE: TOE
LOCATION ZONE: TRUNK

## 2022-05-04 ASSESSMENT — LOCATION DETAILED DESCRIPTION DERM
LOCATION DETAILED: RIGHT MEDIAL PLANTAR 1ST TOE
LOCATION DETAILED: INFERIOR THORACIC SPINE

## 2022-05-04 ASSESSMENT — LOCATION SIMPLE DESCRIPTION DERM
LOCATION SIMPLE: UPPER BACK
LOCATION SIMPLE: PLANTAR SURFACE OF RIGHT 1ST TOE

## 2022-05-04 NOTE — PROCEDURE: LIQUID NITROGEN
Render Post-Care Instructions In Note?: yes
Detail Level: Detailed
Post-Care Instructions: I reviewed with the patient in detail post-care instructions. Patient is to wear sunprotection, and avoid picking at any of the treated lesions. Pt may apply Vaseline to crusted or scabbing areas.
Duration Of Freeze Thaw-Cycle (Seconds): 2
Medical Necessity Clause: This procedure was medically necessary because the lesions that were treated were:
Spray Paint Text: The liquid nitrogen was applied to the skin utilizing a spray paint frosting technique.
Spray Paint Technique: No
Consent: The patient's consent was obtained including but not limited to risks of crusting, scabbing, blistering, scarring, darker or lighter pigmentary change, recurrence, incomplete removal and infection.
Medical Necessity Information: It is in your best interest to select a reason for this procedure from the list below. All of these items fulfill various CMS LCD requirements except the new and changing color options.
Pared With?: 15 blade
Number Of Freeze-Thaw Cycles: 3 freeze-thaw cycles

## 2022-05-04 NOTE — HPI: FULL BODY SKIN EXAMINATION
How Severe Are Your Spot(S)?: mild
What Type Of Note Output Would You Prefer (Optional)?: Standard Output
What Is The Reason For Today's Visit?: Full Body Skin Examination
What Is The Reason For Today's Visit? (Being Monitored For X): concerning skin lesions on an annual basis
Additional History: She had a blistering sunburn in college.   History of tanning bed use in high school. Now she is great about sunscreen application.

## 2022-10-15 ENCOUNTER — HEALTH MAINTENANCE LETTER (OUTPATIENT)
Age: 37
End: 2022-10-15

## 2023-03-26 ENCOUNTER — HEALTH MAINTENANCE LETTER (OUTPATIENT)
Age: 38
End: 2023-03-26

## 2023-06-08 NOTE — PROVIDER NOTIFICATION
01/10/20 1300   Provider Notification   Provider Name/Title Dr Lo   Method of Notification Phone   Request Evaluate - Remote   Notification Reason Patient Arrived   Dr Lo paged. Awaiting return call.   [facility] MEDICINE PROGRESS NOTE   Patient: Jamal Brown  Today's Date: 2023    YOB: 1958  Admission Date: 2023    MRN: [id number]  Inpatient LOS: 2    Room: [address]  Hospital Day: Hospital Day: 3    Subjective   HISTORY AND SUBJECTIVE COMPLAINTS     Chief Complaint:   Left foot big toe gangrene with cellulitis and redness.      Subjective     Patient is awake but is a bit agitated, he wants to go, he wants to know why an angiogram was done right away.      ROS:  Pertinent systems negative except as above.    Objective   PHYSICAL EXAMINATION     Vital 24 Hour Range Most Recent Value   Temperature Temp  Min: 98.2 °F (36.8 °C)  Max: 99.7 °F (37.6 °C) 98.2 °F (36.8 °C)   Pulse Pulse  Min: 92  Max: 103 95   Respiratory Resp  Min: 17  Max: 20 18   Blood Pressure BP  Min: 108/64  Max: 155/69 (!) 151/70   Pulse Oximetry SpO2  Min: 79 %  Max: 99 % 92 %   Arterial BP No data recorded     O2 O2 Flow Rate (L/min)  Av L/min  Min: 2 L/min   Min taken time: 23  Max: 2 L/min   Max taken time: 23       Recorded Intake and Output:    Intake/Output Summary (Last 24 hours) at 2023 1323  Last data filed at 2023 0800  Gross per 24 hour   Intake --   Output 1025 ml   Net -1025 ml      Recorded Last Stool Occurrence:       Vital Most Recent Value First Value   Weight 98.1 kg (216 lb 4.3 oz) Weight: 96.8 kg (213 lb 6.5 oz)   Height 5' 11\" (180.3 cm) Height: 5' 11\" (180.3 cm)   BMI 30.16 N/A       Skin left foot big toe black discoloration   Left foot some redness is seen,  Muscle atrophy.  Neck supple  Lung scattered crackles with adequate air flow  CVS S 1 and S 2  Abdomen bowel sound is present  CNS cranial nerves are intact   Motor and sensory are at base line      TEST RESULTS     Labs: The Laboratory values listed below have been reviewed and pertinent findings discussed in the Assessment and Plan.    Laboratory values:   Recent Labs   Lab  06/08/23  0332 06/07/23  0614 06/06/23  1237   WBC 6.3 7.2 12.1*   HGB 11.9* 11.2* 13.7   HCT 37.4* 34.3* 41.8    242 323       Recent Labs   Lab 06/08/23  0332 06/07/23  0614 06/06/23  1239 06/06/23  1237   SODIUM 136 132*  --  131*   POTASSIUM 3.7 3.7  --  4.2   CHLORIDE 102 100  --  96*   CO2 26 23  --  27   CALCIUM 8.9 8.5  --  9.9   GLUCOSE 236* 196*  --  170*   BUN 36* 37*  --  35*   CREATININE 1.73* 1.63* 1.60* 1.51*        Recent Labs   Lab 06/08/23 0332 06/07/23 0614 06/06/23  1237   ALBUMIN 2.4* 2.4* 2.9*   * 76* 54*   GPT 48 34 26   BILIRUBIN 1.3* 2.0* 2.1*       Radiology: Imaging studies have been reviewed and pertinent findings discussed in the Assessment and Plan.  Results for orders placed or performed during the hospital encounter of 06/06/23 (from the past 48 hour(s))   XR FOOT 2 VIEWS LEFT    Impression    FINDINGS/IMPRESSION:      Dorsal forefoot soft tissue swelling. Atherosclerosis. Fifth toe  amputation. Midfoot degenerative changes (Charcot arthropathy) and severe  first MTP OA. Questionable erosion involving second metatarsal shaft  medially. Diminutive appearance of the fourth distal phalanx could be  related to chronic osteomyelitis. Consider follow-up MRI. Calcaneal  enthesophytes. Atherosclerosis.         MRI FOOT LEFT W WO CONTRAST    Impression    IMPRESSION:     1. Findings suggestive of osteomyelitis of the first distal phalanx with  possible devitalized aeration of the first distal phalanx bone and  overlying soft tissues.  2. Nonspecific edema within the first, second and third metatarsals and the  first proximal phalanx. This may be related to osteoarthritis and abnormal  stress reaction. No definitive overlying ulcer or sinus tract on MRI.  Correlate with physical exam to exclude possibility of infection.        ANCILLARY ORDERS     Diet:  Regular Diet  One Time Diet Regular; Please Deliver Tray To Ed G13 Thank You!!  One Time Diet Regular; Please Deliver Regular  Breakfast Tray To Rm G13. Thank You!  One Time Diet Regular; Please Bring Tray To Rm G13. Thank You!  One Time Diet Regular; Please Bring A Lunch Tray To G13, Thank You.  Telemetry: On  Consults:    PHARMACY TO DOSE AND MONITOR VANCOMYCIN  IP CONSULT TO WOUND CARE MEDICAL PROVIDER  IP CONSULT TO NUTRITION SERVICES  IP CONSULT TO VASCULAR SURGERY  IP CONSULT TO PODIATRY  IP CONSULT TO NEPHROLOGY  IP CONSULT TO INFECTIOUS DISEASES  Therapy Orders:   PT and OT Orders Placed this Encounter   Procedures   • Occupational Therapy   • Physical Therapy       ADVANCED DIRECTIVES     Code Status: Full Resuscitation         ASSESSMENT AND PLAN       L03.116 left lower extremity cellulitis and wound  Left foot big toe gangrene .  I73.9 Peripheral vascular disease, unspecified  E11.8 Type 2 Diabetes Mellitus with unspecified complications  I10 Essential Hypertension  Chronic anticoagulation.    Plan    Patient is a bit agitated, he is at  risk for leaving AMA.      Worsening renal function with plans for angiogram tomorrow I have consulted Nephrology who recommend to hold off on angiogram unless it is urgent.    Discussed with vascular and Orthopedics, both procedures are not urgent and will be placed on hold until renal function stabilizes.      We will continue with heparin drip for now    Continue with IV antibiotics, has Escherichia coli bacteremia  , consulted ID        MRI of the left foot    IMPRESSION:      1. Findings suggestive of osteomyelitis of the first distal phalanx with  possible devitalized aeration of the first distal phalanx bone and  overlying soft tissues.  2. Nonspecific edema within the first, second and third metatarsals and the  first proximal phalanx. This may be related to osteoarthritis and abnormal  stress reaction. No definitive overlying ulcer or sinus tract on MRI.  Correlate with physical exam to exclude possibility of infection.             Continue antihypertensive.           appropriate  narcotics for pain control.    given diabetic diet.              Smoking status: non smoker    Nutrition status: appropriate  Body mass index is 30.16 kg/m². - appropriate weight BMI 18.5-24  DVT Prophylaxis:            DISCHARGE PLANNING     The patient's treatment plans were discussed with patient.     Recommendations for Discharge   SW     PT     OT     SLP        Anticipated discharge destination: Home  Expected Discharge Date: 06/11/2023  Barriers to Discharge: Patient is not medically ready and needs to remain in the hospital today due to Left foot gangrene .      06/08/23    Visit Vitals  /68 (BP Location: RUE - Right upper extremity)   Pulse (!) 101   Temp 98.8 °F (37.1 °C) (Oral)   Resp 18   Ht 5' 11\" (1.803 m)   Wt 98.1 kg (216 lb 4.3 oz)   SpO2 95%   BMI 30.16 kg/m²         Physician addendum :      Patient remains very agitated and a bit frustrated with past 1 week events.  I reviewed MRI in detail indicated to him that he is evidence of underlying osteomyelitis and dry gangrene of his toe he will need angiogram and possible toe amputation, unfortunately fails to understand why it happened.    Chart reviewed.  Case discussed in detail with Sarah ROSSI   Portions of the history and physical exam were independently performed by me.  The entire medical decision making was done by me.  The Nurse practitioner  has collaborated with me and scribed my plan of care in her note.  Treatment plan discussed with the patient.     Lungs clear, heart S1S2, abdomen soft.    Labs, imaging, cultures reviewed      Trish Haeth MD

## 2023-07-14 ENCOUNTER — APPOINTMENT (OUTPATIENT)
Dept: URBAN - METROPOLITAN AREA CLINIC 253 | Age: 38
Setting detail: DERMATOLOGY
End: 2023-07-14

## 2023-07-14 VITALS — WEIGHT: 180 LBS | HEIGHT: 67 IN | RESPIRATION RATE: 14 BRPM

## 2023-07-14 DIAGNOSIS — L60.1 ONYCHOLYSIS: ICD-10-CM

## 2023-07-14 DIAGNOSIS — Z71.89 OTHER SPECIFIED COUNSELING: ICD-10-CM

## 2023-07-14 DIAGNOSIS — D22 MELANOCYTIC NEVI: ICD-10-CM

## 2023-07-14 DIAGNOSIS — D18.0 HEMANGIOMA: ICD-10-CM

## 2023-07-14 DIAGNOSIS — L82.1 OTHER SEBORRHEIC KERATOSIS: ICD-10-CM

## 2023-07-14 DIAGNOSIS — L81.4 OTHER MELANIN HYPERPIGMENTATION: ICD-10-CM

## 2023-07-14 PROBLEM — D22.5 MELANOCYTIC NEVI OF TRUNK: Status: ACTIVE | Noted: 2023-07-14

## 2023-07-14 PROBLEM — D18.01 HEMANGIOMA OF SKIN AND SUBCUTANEOUS TISSUE: Status: ACTIVE | Noted: 2023-07-14

## 2023-07-14 PROCEDURE — OTHER ADDITIONAL NOTES: OTHER

## 2023-07-14 PROCEDURE — OTHER MIPS QUALITY: OTHER

## 2023-07-14 PROCEDURE — 99213 OFFICE O/P EST LOW 20 MIN: CPT

## 2023-07-14 PROCEDURE — OTHER COUNSELING: OTHER

## 2023-07-14 ASSESSMENT — LOCATION SIMPLE DESCRIPTION DERM
LOCATION SIMPLE: LOWER BACK
LOCATION SIMPLE: RIGHT GREAT TOE
LOCATION SIMPLE: UPPER BACK

## 2023-07-14 ASSESSMENT — LOCATION ZONE DERM
LOCATION ZONE: TOENAIL
LOCATION ZONE: TRUNK

## 2023-07-14 ASSESSMENT — LOCATION DETAILED DESCRIPTION DERM
LOCATION DETAILED: RIGHT GREAT TOENAIL
LOCATION DETAILED: SUPERIOR LUMBAR SPINE
LOCATION DETAILED: INFERIOR THORACIC SPINE

## 2023-07-14 NOTE — HPI: PREVENTATIVE SKIN CHECK
What Is The Reason For Today's Visit?: Full Body Skin Examination
Additional History: MINDI, her concern today is a lifting great toenail after an injury.

## 2023-07-14 NOTE — PROCEDURE: COUNSELING
Detail Level: Detailed
Patient Specific Counseling (Will Not Stick From Patient To Patient): Recommended trimming nail short and monitoring regrowth post injury. Discussed it may improve but if she wants removal, call back for appt with AG or WT, or can see podiatrist.
Detail Level: Generalized

## 2024-05-26 ENCOUNTER — HEALTH MAINTENANCE LETTER (OUTPATIENT)
Age: 39
End: 2024-05-26

## 2025-07-01 NOTE — H&P
[facility] Labor and Delivery Triage Note    Gay Cummins MRN# [id number]   Age: 35 year old YOB: 1985     Date of Admission:  2020    Primary care provider: Lakshmi Nathan           Chief Complaint:   Gay Cummins is a 35 year old female who is  @ 32w1d pregnant twin gestation and being seen for back pain, pelvic pressure.          Pregnancy history:     OBSTETRIC HISTORY:    OB History    Para Term  AB Living   2 1 1 0 0 1   SAB TAB Ectopic Multiple Live Births   0 0 0 0 1      # Outcome Date GA Lbr Waqas/2nd Weight Sex Delivery Anes PTL Lv   2 Current            1 Term 17 39w5d  3.31 kg (7 lb 4.8 oz) F CS-LTranv EPI N ANGELA      Name: Chayito      Apgar1: 9  Apgar5: 9       EDC: Estimated Date of Delivery: May 27, 2020    Prenatal Labs:   Lab Results   Component Value Date    ABO A 10/11/2019    RH Pos 10/11/2019    AS Neg 10/11/2019    HEPBANG Nonreactive 10/11/2019    CHPCRT Negative 10/11/2019    GCPCRT Negative 10/11/2019    TREPAB Negative 2017    HGB 11.9 2020       GBS Status:   Lab Results   Component Value Date    GBS  2017     Negative  No GBS DNA detected, presumed negative for GBS or number of bacteria may be   below the limit of detection of the assay.   Assay performed on incubated broth culture of specimen using [employer] real-time   PCR.         Active Problem List  Patient Active Problem List   Diagnosis     Sacroiliac joint pain     Moderate episode of recurrent major depressive disorder (H)     SANDEEP (generalized anxiety disorder)     Dichorionic diamniotic twin pregnancy, antepartum     History of  delivery     Indication for care in labor or delivery     Pelvic pain in pregnancy     Low back pain during pregnancy     Encounter for triage in pregnant patient     Polyhydramnios in third trimester, fetus 2 of multiple gestation       Medication Prior to Admission  Medications Prior to Admission  No medication changes today.    Please schedule the mammogram and bone scan.     Please follow up with the eye doctor.     Please have fasting labs done.     Follow up with me in 1 year for your next annual physical (unless indicated otherwise by the labs etc).          Medication Sig Dispense Refill Last Dose     aspirin (ASA) 81 MG chewable tablet Take 81 mg by mouth daily   4/1/2020 at Unknown time     citalopram (CELEXA) 40 MG tablet Take 1 tablet (40 mg) by mouth daily 180 tablet 1 4/1/2020 at Unknown time     diphenhydrAMINE (BENADRYL ALLERGY) 25 MG tablet Take 25 mg by mouth every 6 hours as needed for itching, sleep or other   4/1/2020 at Unknown time     ondansetron (ZOFRAN) 8 MG tablet Take 1 tablet (8 mg) by mouth every 8 hours as needed for nausea 30 tablet 3 4/1/2020 at Unknown time     Prenatal Vit-Fe Fumarate-FA (PRENATAL VITAMIN) 27-0.8 MG TABS    4/1/2020 at Unknown time     citalopram (CELEXA) 20 MG tablet Take 10 mg PO daily x 1 wk, then take 20 mg daily 60 tablet 0      hydrOXYzine (ATARAX) 25 MG tablet Take 1 tablet (25 mg) by mouth 3 times daily as needed for anxiety 30 tablet 0 More than a month at Unknown time     LORazepam (ATIVAN) 0.5 MG tablet Take 1 tablet (0.5 mg) by mouth every 6 hours as needed for anxiety 12 tablet 0      Misc. Devices (BREAST PUMP) MISC 1 each as needed (prn) 1 each 0    .        Maternal Past Medical History:     Past Medical History:   Diagnosis Date     Anxiety     in High school     Depression     in High school                       Family History:   This patient has no significant family history            Social History:   This patient has no significant social history         Review of Systems:   CONSTITUTIONAL: NEGATIVE for fever, chills, change in weight  INTEGUMENTARY/SKIN: NEGATIVE for worrisome rashes, moles or lesions  EYES: NEGATIVE for vision changes or irritation  ENT/MOUTH: NEGATIVE for ear, mouth and throat problems  RESP: NEGATIVE for significant cough or SOB  BREAST: NEGATIVE for masses, tenderness or discharge  CV: NEGATIVE for chest pain, palpitations or peripheral edema  GI: NEGATIVE for nausea, abdominal pain, heartburn, or change in bowel habits  : NEGATIVE for frequency, dysuria, or  hematuria  MUSCULOSKELETAL: NEGATIVE for significant arthralgias or myalgia  NEURO: NEGATIVE for weakness, dizziness or paresthesias  ENDOCRINE: NEGATIVE for temperature intolerance, skin/hair changes  HEME: NEGATIVE for bleeding problems  PSYCHIATRIC: NEGATIVE for changes in mood or affect          Physical Exam:     Vitals were reviewed  All vitals stable     Patient Vitals for the past 8 hrs:   BP   04/02/20 1715 116/75     Constitutional: Awake, alert, cooperative, tearful upon interview appears stated age.  Eyes: Lids and lashes normal, pupils equal, round and reactive to light, extra ocular muscles intact, sclera clear, conjunctiva normal.  ENT: Normocephalic, without obvious abnormality, atramatic, sinuses nontender on palpation, external ears without lesions, oral pharynx with moist mucus membranes, tonsils without erythema or exudates, gums normal and good dentition.  Neck: Supple, symmetrical, trachea midline, no adenopathy, thyroid symmetric, not enlarged and no tenderness, skin normal.  Hematologic / Lymphatic: No cervical lymphadenopathy and no supraclavicular lymphadenopathy.  Back: Symmetric, no curvature, spinous processes are non-tender on palpation, paraspinous muscles are non-tender on palpation, no costal vertebral tenderness.  Abdomen: No scars, normal bowel sounds, soft, non-distended, non-tender, no masses palpated, no hepatosplenomegally.  Genitourinary: per RN   Musculoskeletal: pain palpated at left SI joint and up into paraspinal muscles, which are tight   Neurologic: Awake, alert, oriented to name, place and time.  Cranial nerves II-XII are grossly intact.  Motor is 5 out of 5 bilaterally.  Cerebellar finger to nose, heel to shin intact.  Sensory is intact.  Babinski down going, Romberg negative, and gait is normal.  Neuropsychiatric: Normal affect, mood, orientation, memory and insight.  Skin: No rashes, erythema, pallor, petechia or purpura.   Cervix:   Pending   Presentation:not  assessed  Fetal Heart Rate Tracing: Tier 1 (normal)  Tocometer: frequency q rare  minutes                       Assessment:   Gay Cummins is a 35 year old female who is  @ 32w1d pregnant twin gestation and being seen for back pain, pelvic pressure.        Plan:   1) Pelvic pressure:  Check ua and rule out UTI/pyelonephritis, rule out labor with cervical exam per RN  UA is normal, see #2   2) Back pain with SI pain:  Pain palpated at left SI joint, recommend to continue Chiropractic care, rx atarax 10 mg at night to help with muscle spasm, hot baths, and to purchase SI belt.  SI belt is modeled for her and where to place on her back.   rx called in for her.   3) Anxiety:  Recently increased anxiety medication dose, recommend to reach out to her therapist as able.  Has increased stress with covid 19.     Chela Maria, DO

## (undated) DEVICE — PAD CHUX UNDERPAD 30X36" P3036C

## (undated) DEVICE — SU MONOCRYL 0 CTX 36" Y398H

## (undated) DEVICE — SU MONOCRYL 0 CT 36" Y358H

## (undated) DEVICE — ESU LIGASURE OPEN SEALER/DIVIDER SM JAW 16.5MM LF1212A

## (undated) DEVICE — TRANSFER DEVICE BLOOD NDL HOLDER 364880

## (undated) DEVICE — LINEN FULL SHEET 5511

## (undated) DEVICE — LINEN HALF SHEET 5512

## (undated) DEVICE — PREP CHLORAPREP 26ML TINTED ORANGE  260815

## (undated) DEVICE — GLOVE PROTEXIS MICRO 6.5  2D73PM65

## (undated) DEVICE — LINEN BABY BLANKET 5434

## (undated) DEVICE — SOLIDIFIER FLUID RED 1500ML LIQUID KIT SYS POWDER ISOB1500

## (undated) DEVICE — GLOVE PROTEXIS POWDER FREE SMT 6.0  2D72PT60X

## (undated) DEVICE — PREP SKIN SCRUB TRAY 4461A

## (undated) DEVICE — SUCTION CANISTER MEDIVAC LINER 3000ML W/LID 65651-530

## (undated) DEVICE — CAP BABY PINK/BLUE IC-2

## (undated) DEVICE — SU PDS II 0 CTX 60" Z990G

## (undated) DEVICE — SU MONOCRYL 4-0 PS-2 27" UND Y426H

## (undated) DEVICE — SOL NACL 0.9% IRRIG 1000ML BOTTLE 2F7124

## (undated) DEVICE — PACK C-SECTION LF PL15OTA83B

## (undated) DEVICE — SOL WATER IRRIG 1000ML BOTTLE 2F7114

## (undated) DEVICE — LINEN TOWEL PACK X10 5473

## (undated) DEVICE — GLOVE PROTEXIS BLUE W/NEU-THERA 6.5  2D73EB65

## (undated) DEVICE — ESU GROUND PAD ADULT W/CORD E7507

## (undated) DEVICE — DRSG STERI STRIP 1/2X4" R1547

## (undated) DEVICE — GLOVE PROTEXIS W/NEU-THERA 7.5  2D73TE75

## (undated) DEVICE — LINEN DRAPE 54X72" 5467

## (undated) DEVICE — BAG CLEAR TRASH 1.3M 39X33" P4040C

## (undated) DEVICE — BLADE CLIPPER SGL USE 9680

## (undated) DEVICE — PREP POVIDONE IODINE SOLUTION 10% 4OZ BOTTLE 29906-004

## (undated) DEVICE — CATH TRAY FOLEY SURESTEP 16FR DRAIN BAG STATOCK A899916

## (undated) DEVICE — SU MONOCRYL 3-0 SH 27" Y316H

## (undated) DEVICE — BARRIER SEPRAFILM 5X6" SINGLE SHEET 4301-02

## (undated) DEVICE — STOCKING SLEEVE VASOPRESS COMPRESSION CALF MED 18" VP501M

## (undated) DEVICE — DRSG TEGADERM 4X10" 1627

## (undated) DEVICE — CLAMP UMBILICAL CORD PLASTIC 6833

## (undated) DEVICE — DRSG TELFA 3X8" 1238

## (undated) DEVICE — SU VICRYL 0 CT-1 36" J346H

## (undated) RX ORDER — OXYTOCIN/0.9 % SODIUM CHLORIDE 30/500 ML
PLASTIC BAG, INJECTION (ML) INTRAVENOUS
Status: DISPENSED
Start: 2017-08-08

## (undated) RX ORDER — MORPHINE SULFATE 10 MG/ML
INJECTION, SOLUTION INTRAMUSCULAR; INTRAVENOUS
Status: DISPENSED
Start: 2020-04-29

## (undated) RX ORDER — EPHEDRINE SULFATE 50 MG/ML
INJECTION, SOLUTION INTRAMUSCULAR; INTRAVENOUS; SUBCUTANEOUS
Status: DISPENSED
Start: 2017-08-08

## (undated) RX ORDER — ONDANSETRON 2 MG/ML
INJECTION INTRAMUSCULAR; INTRAVENOUS
Status: DISPENSED
Start: 2017-08-08

## (undated) RX ORDER — MORPHINE SULFATE 1 MG/ML
INJECTION, SOLUTION EPIDURAL; INTRATHECAL; INTRAVENOUS
Status: DISPENSED
Start: 2017-08-08

## (undated) RX ORDER — ONDANSETRON 2 MG/ML
INJECTION INTRAMUSCULAR; INTRAVENOUS
Status: DISPENSED
Start: 2020-04-29

## (undated) RX ORDER — PHENYLEPHRINE HCL IN 0.9% NACL 1 MG/10 ML
SYRINGE (ML) INTRAVENOUS
Status: DISPENSED
Start: 2020-04-29

## (undated) RX ORDER — PHENYLEPHRINE HCL IN 0.9% NACL 1 MG/10 ML
SYRINGE (ML) INTRAVENOUS
Status: DISPENSED
Start: 2017-08-08

## (undated) RX ORDER — MORPHINE SULFATE 1 MG/ML
INJECTION, SOLUTION EPIDURAL; INTRATHECAL; INTRAVENOUS
Status: DISPENSED
Start: 2020-04-29

## (undated) RX ORDER — OXYTOCIN/0.9 % SODIUM CHLORIDE 30/500 ML
PLASTIC BAG, INJECTION (ML) INTRAVENOUS
Status: DISPENSED
Start: 2020-04-29